# Patient Record
Sex: MALE | Race: WHITE | Employment: OTHER
[De-identification: names, ages, dates, MRNs, and addresses within clinical notes are randomized per-mention and may not be internally consistent; named-entity substitution may affect disease eponyms.]

---

## 2017-01-25 ENCOUNTER — OFFICE VISIT (OUTPATIENT)
Facility: CLINIC | Age: 57
End: 2017-01-25

## 2017-01-25 VITALS
WEIGHT: 147 LBS | TEMPERATURE: 97.8 F | HEIGHT: 69 IN | SYSTOLIC BLOOD PRESSURE: 130 MMHG | HEART RATE: 66 BPM | BODY MASS INDEX: 21.77 KG/M2 | OXYGEN SATURATION: 95 % | DIASTOLIC BLOOD PRESSURE: 70 MMHG

## 2017-01-25 DIAGNOSIS — S20.211D RIB CONTUSION, RIGHT, SUBSEQUENT ENCOUNTER: Primary | ICD-10-CM

## 2017-01-25 PROCEDURE — G8420 CALC BMI NORM PARAMETERS: HCPCS | Performed by: NURSE PRACTITIONER

## 2017-01-25 PROCEDURE — G8484 FLU IMMUNIZE NO ADMIN: HCPCS | Performed by: NURSE PRACTITIONER

## 2017-01-25 PROCEDURE — 1036F TOBACCO NON-USER: CPT | Performed by: NURSE PRACTITIONER

## 2017-01-25 PROCEDURE — G8427 DOCREV CUR MEDS BY ELIG CLIN: HCPCS | Performed by: NURSE PRACTITIONER

## 2017-01-25 PROCEDURE — 3017F COLORECTAL CA SCREEN DOC REV: CPT | Performed by: NURSE PRACTITIONER

## 2017-01-25 PROCEDURE — 99213 OFFICE O/P EST LOW 20 MIN: CPT | Performed by: NURSE PRACTITIONER

## 2017-01-25 RX ORDER — HYDROCODONE BITARTRATE AND ACETAMINOPHEN 5; 325 MG/1; MG/1
1 TABLET ORAL EVERY 6 HOURS PRN
Qty: 20 TABLET | Refills: 0 | Status: SHIPPED | OUTPATIENT
Start: 2017-01-25 | End: 2017-03-16

## 2017-01-25 ASSESSMENT — ENCOUNTER SYMPTOMS: COUGH: 1

## 2017-02-15 ENCOUNTER — OFFICE VISIT (OUTPATIENT)
Dept: CARDIOTHORACIC SURGERY | Facility: CLINIC | Age: 57
End: 2017-02-15

## 2017-02-15 VITALS
HEART RATE: 77 BPM | HEIGHT: 69 IN | RESPIRATION RATE: 18 BRPM | SYSTOLIC BLOOD PRESSURE: 175 MMHG | BODY MASS INDEX: 21.62 KG/M2 | DIASTOLIC BLOOD PRESSURE: 110 MMHG | WEIGHT: 146 LBS | TEMPERATURE: 97.9 F | OXYGEN SATURATION: 99 %

## 2017-02-15 DIAGNOSIS — Z86.79 S/P THORACIC AORTIC ANEURYSM REPAIR: Primary | ICD-10-CM

## 2017-02-15 DIAGNOSIS — Z98.890 S/P THORACIC AORTIC ANEURYSM REPAIR: Primary | ICD-10-CM

## 2017-02-15 PROCEDURE — 99213 OFFICE O/P EST LOW 20 MIN: CPT | Performed by: PHYSICIAN ASSISTANT

## 2017-02-15 PROCEDURE — G8484 FLU IMMUNIZE NO ADMIN: HCPCS | Performed by: PHYSICIAN ASSISTANT

## 2017-02-15 PROCEDURE — G8420 CALC BMI NORM PARAMETERS: HCPCS | Performed by: PHYSICIAN ASSISTANT

## 2017-02-15 PROCEDURE — 1036F TOBACCO NON-USER: CPT | Performed by: PHYSICIAN ASSISTANT

## 2017-02-15 PROCEDURE — 3017F COLORECTAL CA SCREEN DOC REV: CPT | Performed by: PHYSICIAN ASSISTANT

## 2017-02-15 PROCEDURE — G8427 DOCREV CUR MEDS BY ELIG CLIN: HCPCS | Performed by: PHYSICIAN ASSISTANT

## 2017-02-15 ASSESSMENT — ENCOUNTER SYMPTOMS
RESPIRATORY NEGATIVE: 1
GASTROINTESTINAL NEGATIVE: 1
EYES NEGATIVE: 1

## 2017-02-16 ENCOUNTER — HOSPITAL ENCOUNTER (OUTPATIENT)
Age: 57
Setting detail: SPECIMEN
Discharge: HOME | End: 2017-02-16
Payer: MEDICARE

## 2017-02-16 LAB
ABSOLUTE EOS #: 0.4 K/UL (ref 0–0.4)
ABSOLUTE LYMPH #: 1.8 K/UL (ref 1–4.8)
ABSOLUTE MONO #: 0.7 K/UL (ref 0.1–1.2)
BASOPHILS # BLD: 1 % (ref 0–2)
BASOPHILS ABSOLUTE: 0.1 K/UL (ref 0–0.2)
C-REACTIVE PROTEIN: 7.2 MG/L (ref 0–5)
DIFFERENTIAL TYPE: ABNORMAL
EOSINOPHILS RELATIVE PERCENT: 4 % (ref 1–4)
HCT VFR BLD CALC: 38.9 % (ref 41–53)
HEMOGLOBIN: 13.1 G/DL (ref 13.5–17.5)
LYMPHOCYTES # BLD: 18 % (ref 24–44)
MCH RBC QN AUTO: 30.5 PG (ref 26–34)
MCHC RBC AUTO-ENTMCNC: 33.6 G/DL (ref 31–37)
MCV RBC AUTO: 90.7 FL (ref 80–100)
MONOCYTES # BLD: 7 % (ref 2–11)
PDW BLD-RTO: 15 % (ref 12.5–15.4)
PLATELET # BLD: 289 K/UL (ref 140–450)
PLATELET ESTIMATE: ABNORMAL
PMV BLD AUTO: 9.5 FL (ref 6–12)
RBC # BLD: 4.29 M/UL (ref 4.5–5.9)
RBC # BLD: ABNORMAL 10*6/UL
SEDIMENTATION RATE, ERYTHROCYTE: 1 MM (ref 0–10)
SEG NEUTROPHILS: 70 % (ref 36–66)
SEGMENTED NEUTROPHILS ABSOLUTE COUNT: 7.2 K/UL (ref 1.8–7.7)
WBC # BLD: 10.3 K/UL (ref 3.5–11)
WBC # BLD: ABNORMAL 10*3/UL

## 2017-02-16 PROCEDURE — 87040 BLOOD CULTURE FOR BACTERIA: CPT

## 2017-02-16 PROCEDURE — 85651 RBC SED RATE NONAUTOMATED: CPT

## 2017-02-16 PROCEDURE — 86140 C-REACTIVE PROTEIN: CPT

## 2017-02-16 PROCEDURE — 85025 COMPLETE CBC W/AUTO DIFF WBC: CPT

## 2017-02-22 LAB
CULTURE: NORMAL
Lab: NORMAL
Lab: NORMAL
SPECIMEN DESCRIPTION: NORMAL
SPECIMEN DESCRIPTION: NORMAL
STATUS: NORMAL
STATUS: NORMAL

## 2017-03-16 ENCOUNTER — HOSPITAL ENCOUNTER (OUTPATIENT)
Dept: PAIN MANAGEMENT | Age: 57
Discharge: HOME | End: 2017-03-16
Payer: MEDICARE

## 2017-03-16 VITALS
HEART RATE: 64 BPM | SYSTOLIC BLOOD PRESSURE: 162 MMHG | DIASTOLIC BLOOD PRESSURE: 115 MMHG | TEMPERATURE: 97.7 F | RESPIRATION RATE: 16 BRPM

## 2017-03-16 DIAGNOSIS — M54.5 CHRONIC RIGHT-SIDED LOW BACK PAIN, WITH SCIATICA PRESENCE UNSPECIFIED: ICD-10-CM

## 2017-03-16 DIAGNOSIS — M54.2 NECK PAIN: Primary | ICD-10-CM

## 2017-03-16 DIAGNOSIS — G89.29 CHRONIC RIGHT-SIDED LOW BACK PAIN, WITH SCIATICA PRESENCE UNSPECIFIED: ICD-10-CM

## 2017-03-16 PROCEDURE — G0463 HOSPITAL OUTPT CLINIC VISIT: HCPCS

## 2017-03-16 ASSESSMENT — ENCOUNTER SYMPTOMS
SPUTUM PRODUCTION: 0
BOWEL INCONTINENCE: 0
EYE DISCHARGE: 0
HEMOPTYSIS: 0
JAUNDICE: 0
STRIDOR: 0
UNUSUAL HAIR DISTRIBUTION: 0
SUSPICIOUS LESIONS: 0

## 2017-03-16 ASSESSMENT — PAIN SCALES - GENERAL: PAINLEVEL_OUTOF10: 8

## 2017-03-16 ASSESSMENT — PAIN DESCRIPTION - ONSET: ONSET: ON-GOING

## 2017-03-16 ASSESSMENT — PAIN DESCRIPTION - ORIENTATION: ORIENTATION: RIGHT;LEFT

## 2017-03-16 ASSESSMENT — PAIN DESCRIPTION - PROGRESSION: CLINICAL_PROGRESSION: NOT CHANGED

## 2017-03-16 ASSESSMENT — PAIN DESCRIPTION - PAIN TYPE: TYPE: CHRONIC PAIN

## 2017-03-16 ASSESSMENT — PAIN DESCRIPTION - FREQUENCY: FREQUENCY: CONTINUOUS

## 2017-03-16 ASSESSMENT — PAIN DESCRIPTION - DESCRIPTORS: DESCRIPTORS: CONSTANT;SHARP;DULL

## 2017-03-20 ENCOUNTER — HOSPITAL ENCOUNTER (OUTPATIENT)
Dept: PHYSICAL THERAPY | Facility: CLINIC | Age: 57
Setting detail: THERAPIES SERIES
Discharge: HOME | End: 2017-03-20
Payer: MEDICARE

## 2017-03-20 PROCEDURE — G8988 SELF CARE GOAL STATUS: HCPCS

## 2017-03-20 PROCEDURE — 97110 THERAPEUTIC EXERCISES: CPT

## 2017-03-20 PROCEDURE — 97161 PT EVAL LOW COMPLEX 20 MIN: CPT

## 2017-03-20 PROCEDURE — 97140 MANUAL THERAPY 1/> REGIONS: CPT

## 2017-03-20 PROCEDURE — G8987 SELF CARE CURRENT STATUS: HCPCS

## 2017-03-23 ENCOUNTER — HOSPITAL ENCOUNTER (OUTPATIENT)
Dept: PHYSICAL THERAPY | Facility: CLINIC | Age: 57
Setting detail: THERAPIES SERIES
Discharge: HOME | End: 2017-03-23
Payer: MEDICARE

## 2017-03-23 PROCEDURE — 97110 THERAPEUTIC EXERCISES: CPT

## 2017-03-23 PROCEDURE — 97140 MANUAL THERAPY 1/> REGIONS: CPT

## 2017-03-24 ENCOUNTER — HOSPITAL ENCOUNTER (OUTPATIENT)
Dept: PAIN MANAGEMENT | Age: 57
Discharge: HOME | End: 2017-03-24
Payer: MEDICARE

## 2017-03-24 ENCOUNTER — HOSPITAL ENCOUNTER (OUTPATIENT)
Age: 57
Setting detail: SPECIMEN
Discharge: HOME | End: 2017-03-24
Payer: MEDICARE

## 2017-03-24 VITALS
SYSTOLIC BLOOD PRESSURE: 104 MMHG | TEMPERATURE: 98.2 F | DIASTOLIC BLOOD PRESSURE: 60 MMHG | HEART RATE: 69 BPM | RESPIRATION RATE: 18 BRPM

## 2017-03-24 DIAGNOSIS — I10 ESSENTIAL HYPERTENSION: ICD-10-CM

## 2017-03-24 DIAGNOSIS — Z12.5 PROSTATE CANCER SCREENING: ICD-10-CM

## 2017-03-24 LAB
ALBUMIN SERPL-MCNC: 4.1 G/DL (ref 3.5–5.2)
ALBUMIN/GLOBULIN RATIO: 1.5 (ref 1–2.5)
ALP BLD-CCNC: 73 U/L (ref 40–129)
ALT SERPL-CCNC: 10 U/L (ref 5–41)
ANION GAP SERPL CALCULATED.3IONS-SCNC: 11 MMOL/L (ref 9–17)
AST SERPL-CCNC: 14 U/L
BILIRUB SERPL-MCNC: 0.46 MG/DL (ref 0.3–1.2)
BUN BLDV-MCNC: 13 MG/DL (ref 6–20)
BUN/CREAT BLD: NORMAL (ref 9–20)
CALCIUM SERPL-MCNC: 8.6 MG/DL (ref 8.6–10.4)
CHLORIDE BLD-SCNC: 100 MMOL/L (ref 98–107)
CHOLESTEROL/HDL RATIO: 3.1
CHOLESTEROL: 151 MG/DL
CO2: 28 MMOL/L (ref 20–31)
CREAT SERPL-MCNC: 0.79 MG/DL (ref 0.7–1.2)
GFR AFRICAN AMERICAN: >60 ML/MIN
GFR NON-AFRICAN AMERICAN: >60 ML/MIN
GFR SERPL CREATININE-BSD FRML MDRD: NORMAL ML/MIN/{1.73_M2}
GFR SERPL CREATININE-BSD FRML MDRD: NORMAL ML/MIN/{1.73_M2}
GLUCOSE BLD-MCNC: 90 MG/DL (ref 70–99)
HCT VFR BLD CALC: 39.9 % (ref 41–53)
HDLC SERPL-MCNC: 49 MG/DL
HEMOGLOBIN: 13.5 G/DL (ref 13.5–17.5)
LDL CHOLESTEROL: 91 MG/DL (ref 0–130)
MCH RBC QN AUTO: 30.6 PG (ref 26–34)
MCHC RBC AUTO-ENTMCNC: 33.8 G/DL (ref 31–37)
MCV RBC AUTO: 90.7 FL (ref 80–100)
PDW BLD-RTO: 15.5 % (ref 12.5–15.4)
PLATELET # BLD: 214 K/UL (ref 140–450)
PMV BLD AUTO: 8.9 FL (ref 6–12)
POTASSIUM SERPL-SCNC: 4.2 MMOL/L (ref 3.7–5.3)
PROSTATE SPECIFIC ANTIGEN: 0.68 UG/L
RBC # BLD: 4.4 M/UL (ref 4.5–5.9)
SODIUM BLD-SCNC: 139 MMOL/L (ref 135–144)
TOTAL PROTEIN: 6.8 G/DL (ref 6.4–8.3)
TRIGL SERPL-MCNC: 53 MG/DL
VLDLC SERPL CALC-MCNC: NORMAL MG/DL (ref 1–30)
WBC # BLD: 10.3 K/UL (ref 3.5–11)

## 2017-03-24 PROCEDURE — 97032 APPL MODALITY 1+ESTIM EA 15: CPT

## 2017-03-24 PROCEDURE — 97112 NEUROMUSCULAR REEDUCATION: CPT

## 2017-03-24 ASSESSMENT — PAIN SCALES - GENERAL: PAINLEVEL_OUTOF10: 8

## 2017-03-24 ASSESSMENT — PAIN DESCRIPTION - DESCRIPTORS: DESCRIPTORS: SHARP;SHOOTING

## 2017-03-24 ASSESSMENT — PAIN DESCRIPTION - PROGRESSION: CLINICAL_PROGRESSION: NOT CHANGED

## 2017-03-24 ASSESSMENT — PAIN DESCRIPTION - DIRECTION: RADIATING_TOWARDS: PT

## 2017-03-24 ASSESSMENT — PAIN DESCRIPTION - LOCATION: LOCATION: ARM

## 2017-03-24 ASSESSMENT — PAIN DESCRIPTION - ONSET: ONSET: ON-GOING

## 2017-03-24 ASSESSMENT — PAIN DESCRIPTION - PAIN TYPE: TYPE: CHRONIC PAIN

## 2017-03-24 ASSESSMENT — PAIN DESCRIPTION - ORIENTATION: ORIENTATION: RIGHT;LEFT

## 2017-03-24 ASSESSMENT — PAIN DESCRIPTION - FREQUENCY: FREQUENCY: CONTINUOUS

## 2017-03-29 ENCOUNTER — HOSPITAL ENCOUNTER (OUTPATIENT)
Dept: PAIN MANAGEMENT | Age: 57
Discharge: HOME | End: 2017-03-29
Payer: MEDICARE

## 2017-03-29 VITALS
WEIGHT: 150 LBS | BODY MASS INDEX: 22.73 KG/M2 | HEART RATE: 79 BPM | DIASTOLIC BLOOD PRESSURE: 86 MMHG | TEMPERATURE: 98.2 F | HEIGHT: 68 IN | RESPIRATION RATE: 16 BRPM | SYSTOLIC BLOOD PRESSURE: 128 MMHG

## 2017-03-29 PROCEDURE — 97032 APPL MODALITY 1+ESTIM EA 15: CPT

## 2017-03-29 PROCEDURE — 97112 NEUROMUSCULAR REEDUCATION: CPT

## 2017-03-29 ASSESSMENT — PAIN DESCRIPTION - ORIENTATION: ORIENTATION: RIGHT;LEFT

## 2017-03-29 ASSESSMENT — PAIN DESCRIPTION - FREQUENCY: FREQUENCY: CONTINUOUS

## 2017-03-29 ASSESSMENT — PAIN DESCRIPTION - PROGRESSION: CLINICAL_PROGRESSION: NOT CHANGED

## 2017-03-29 ASSESSMENT — PAIN SCALES - GENERAL: PAINLEVEL_OUTOF10: 8

## 2017-03-29 ASSESSMENT — PAIN DESCRIPTION - ONSET: ONSET: ON-GOING

## 2017-03-29 ASSESSMENT — PAIN DESCRIPTION - DESCRIPTORS: DESCRIPTORS: SHARP;SHOOTING

## 2017-03-29 ASSESSMENT — PAIN DESCRIPTION - LOCATION: LOCATION: ARM

## 2017-03-29 ASSESSMENT — PAIN DESCRIPTION - PAIN TYPE: TYPE: CHRONIC PAIN

## 2017-03-31 ENCOUNTER — HOSPITAL ENCOUNTER (OUTPATIENT)
Dept: PAIN MANAGEMENT | Age: 57
Discharge: HOME | End: 2017-03-31
Payer: MEDICARE

## 2017-03-31 VITALS
WEIGHT: 150 LBS | BODY MASS INDEX: 22.73 KG/M2 | TEMPERATURE: 98 F | RESPIRATION RATE: 18 BRPM | HEART RATE: 78 BPM | HEIGHT: 68 IN

## 2017-03-31 PROCEDURE — 97032 APPL MODALITY 1+ESTIM EA 15: CPT

## 2017-03-31 PROCEDURE — 97112 NEUROMUSCULAR REEDUCATION: CPT

## 2017-03-31 ASSESSMENT — PAIN DESCRIPTION - DESCRIPTORS: DESCRIPTORS: SHARP;SHOOTING

## 2017-03-31 ASSESSMENT — PAIN SCALES - GENERAL: PAINLEVEL_OUTOF10: 8

## 2017-03-31 ASSESSMENT — PAIN DESCRIPTION - ORIENTATION: ORIENTATION: RIGHT;LEFT

## 2017-03-31 ASSESSMENT — PAIN DESCRIPTION - ONSET: ONSET: ON-GOING

## 2017-03-31 ASSESSMENT — PAIN DESCRIPTION - LOCATION: LOCATION: ARM

## 2017-03-31 ASSESSMENT — PAIN DESCRIPTION - PAIN TYPE: TYPE: CHRONIC PAIN

## 2017-03-31 ASSESSMENT — PAIN DESCRIPTION - FREQUENCY: FREQUENCY: CONTINUOUS

## 2017-03-31 ASSESSMENT — PAIN DESCRIPTION - PROGRESSION: CLINICAL_PROGRESSION: NOT CHANGED

## 2017-04-03 ENCOUNTER — HOSPITAL ENCOUNTER (OUTPATIENT)
Dept: PHYSICAL THERAPY | Facility: CLINIC | Age: 57
Setting detail: THERAPIES SERIES
Discharge: HOME | End: 2017-04-03
Payer: MEDICARE

## 2017-05-01 DIAGNOSIS — I10 ESSENTIAL HYPERTENSION: ICD-10-CM

## 2017-05-02 RX ORDER — AMLODIPINE BESYLATE 5 MG/1
TABLET ORAL
Qty: 30 TABLET | Refills: 5 | Status: SHIPPED | OUTPATIENT
Start: 2017-05-02 | End: 2017-05-14 | Stop reason: SDUPTHER

## 2017-05-08 ENCOUNTER — HOSPITAL ENCOUNTER (OUTPATIENT)
Age: 57
Discharge: HOME | End: 2017-05-08
Payer: MEDICARE

## 2017-05-08 PROCEDURE — 36415 COLL VENOUS BLD VENIPUNCTURE: CPT

## 2017-05-08 PROCEDURE — 87040 BLOOD CULTURE FOR BACTERIA: CPT

## 2017-05-14 ENCOUNTER — HOSPITAL ENCOUNTER (EMERGENCY)
Age: 57
Discharge: HOME | End: 2017-05-14
Attending: EMERGENCY MEDICINE
Payer: MEDICARE

## 2017-05-14 VITALS
HEIGHT: 69 IN | TEMPERATURE: 97.5 F | DIASTOLIC BLOOD PRESSURE: 108 MMHG | RESPIRATION RATE: 16 BRPM | OXYGEN SATURATION: 100 % | BODY MASS INDEX: 21.03 KG/M2 | SYSTOLIC BLOOD PRESSURE: 151 MMHG | WEIGHT: 142 LBS | HEART RATE: 58 BPM

## 2017-05-14 DIAGNOSIS — L02.91 ABSCESS: Primary | ICD-10-CM

## 2017-05-14 PROCEDURE — 10060 I&D ABSCESS SIMPLE/SINGLE: CPT

## 2017-05-14 PROCEDURE — 2500000003 HC RX 250 WO HCPCS: Performed by: PHYSICIAN ASSISTANT

## 2017-05-14 PROCEDURE — 99282 EMERGENCY DEPT VISIT SF MDM: CPT

## 2017-05-14 PROCEDURE — 87205 SMEAR GRAM STAIN: CPT

## 2017-05-14 PROCEDURE — 86403 PARTICLE AGGLUT ANTBDY SCRN: CPT

## 2017-05-14 PROCEDURE — 87070 CULTURE OTHR SPECIMN AEROBIC: CPT

## 2017-05-14 RX ORDER — ASPIRIN 81 MG/1
81 TABLET, CHEWABLE ORAL DAILY
COMMUNITY
End: 2019-12-02 | Stop reason: SDUPTHER

## 2017-05-14 RX ORDER — LIDOCAINE HYDROCHLORIDE 10 MG/ML
20 INJECTION, SOLUTION INFILTRATION; PERINEURAL ONCE
Status: COMPLETED | OUTPATIENT
Start: 2017-05-14 | End: 2017-05-14

## 2017-05-14 RX ORDER — CLINDAMYCIN HYDROCHLORIDE 300 MG/1
300 CAPSULE ORAL 3 TIMES DAILY
Qty: 30 CAPSULE | Refills: 0 | Status: SHIPPED | OUTPATIENT
Start: 2017-05-14 | End: 2017-05-24

## 2017-05-14 RX ADMIN — LIDOCAINE HYDROCHLORIDE 20 ML: 10 INJECTION, SOLUTION INFILTRATION; PERINEURAL at 10:20

## 2017-05-14 ASSESSMENT — PAIN SCALES - GENERAL
PAINLEVEL_OUTOF10: 8
PAINLEVEL_OUTOF10: 8

## 2017-05-14 ASSESSMENT — PAIN DESCRIPTION - LOCATION: LOCATION: GENERALIZED

## 2017-05-14 ASSESSMENT — PAIN DESCRIPTION - PAIN TYPE: TYPE: CHRONIC PAIN

## 2017-05-14 ASSESSMENT — PAIN DESCRIPTION - DESCRIPTORS: DESCRIPTORS: ACHING

## 2017-05-16 LAB
CULTURE: ABNORMAL
CULTURE: ABNORMAL
DIRECT EXAM: ABNORMAL
DIRECT EXAM: ABNORMAL
Lab: ABNORMAL
SPECIMEN DESCRIPTION: ABNORMAL
SPECIMEN DESCRIPTION: ABNORMAL
STATUS: ABNORMAL

## 2017-05-18 ENCOUNTER — HOSPITAL ENCOUNTER (OUTPATIENT)
Dept: PAIN MANAGEMENT | Age: 57
Discharge: HOME | End: 2017-05-18
Payer: MEDICARE

## 2017-05-18 VITALS
SYSTOLIC BLOOD PRESSURE: 163 MMHG | TEMPERATURE: 98.2 F | RESPIRATION RATE: 16 BRPM | HEART RATE: 68 BPM | DIASTOLIC BLOOD PRESSURE: 120 MMHG

## 2017-05-18 PROCEDURE — 99213 OFFICE O/P EST LOW 20 MIN: CPT

## 2017-05-18 ASSESSMENT — ENCOUNTER SYMPTOMS
EYE DISCHARGE: 0
UNUSUAL HAIR DISTRIBUTION: 0
SUSPICIOUS LESIONS: 0
BLURRED VISION: 0

## 2017-05-18 ASSESSMENT — PAIN DESCRIPTION - DESCRIPTORS: DESCRIPTORS: ACHING;CONSTANT;SORE;DISCOMFORT

## 2017-05-18 ASSESSMENT — PAIN DESCRIPTION - LOCATION: LOCATION: GENERALIZED

## 2017-05-18 ASSESSMENT — PAIN DESCRIPTION - PAIN TYPE: TYPE: CHRONIC PAIN

## 2017-05-18 ASSESSMENT — PAIN SCALES - GENERAL: PAINLEVEL_OUTOF10: 8

## 2017-05-18 ASSESSMENT — PAIN DESCRIPTION - FREQUENCY: FREQUENCY: CONTINUOUS

## 2017-05-30 DIAGNOSIS — I10 ESSENTIAL HYPERTENSION: ICD-10-CM

## 2017-05-30 RX ORDER — AMLODIPINE BESYLATE 5 MG/1
TABLET ORAL
Qty: 30 TABLET | Refills: 1 | Status: SHIPPED | OUTPATIENT
Start: 2017-05-30 | End: 2017-06-23 | Stop reason: SDUPTHER

## 2017-06-23 ENCOUNTER — HOSPITAL ENCOUNTER (OUTPATIENT)
Dept: GENERAL RADIOLOGY | Facility: CLINIC | Age: 57
Discharge: HOME | End: 2017-06-23
Payer: MEDICARE

## 2017-06-23 ENCOUNTER — HOSPITAL ENCOUNTER (OUTPATIENT)
Facility: CLINIC | Age: 57
Discharge: HOME | End: 2017-06-23
Payer: MEDICARE

## 2017-06-23 DIAGNOSIS — M54.2 CHRONIC NECK PAIN: ICD-10-CM

## 2017-06-23 DIAGNOSIS — G89.29 CHRONIC NECK PAIN: ICD-10-CM

## 2017-06-23 PROCEDURE — 72040 X-RAY EXAM NECK SPINE 2-3 VW: CPT

## 2017-06-28 ENCOUNTER — HOSPITAL ENCOUNTER (OUTPATIENT)
Dept: CT IMAGING | Age: 57
Discharge: HOME | End: 2017-06-28
Payer: MEDICARE

## 2017-06-28 ENCOUNTER — HOSPITAL ENCOUNTER (EMERGENCY)
Age: 57
Discharge: HOME | End: 2017-06-28
Attending: EMERGENCY MEDICINE
Payer: MEDICARE

## 2017-06-28 VITALS
RESPIRATION RATE: 16 BRPM | SYSTOLIC BLOOD PRESSURE: 154 MMHG | BODY MASS INDEX: 20.74 KG/M2 | DIASTOLIC BLOOD PRESSURE: 115 MMHG | OXYGEN SATURATION: 98 % | WEIGHT: 140 LBS | TEMPERATURE: 98.1 F | HEART RATE: 77 BPM

## 2017-06-28 DIAGNOSIS — T80.1XXA IV INFILTRATE, INITIAL ENCOUNTER: Primary | ICD-10-CM

## 2017-06-28 DIAGNOSIS — J38.4 GLOTTIC EDEMA: ICD-10-CM

## 2017-06-28 PROCEDURE — 70491 CT SOFT TISSUE NECK W/DYE: CPT

## 2017-06-28 PROCEDURE — 2580000003 HC RX 258: Performed by: FAMILY MEDICINE

## 2017-06-28 PROCEDURE — 99283 EMERGENCY DEPT VISIT LOW MDM: CPT

## 2017-06-28 PROCEDURE — 6360000004 HC RX CONTRAST MEDICATION: Performed by: FAMILY MEDICINE

## 2017-06-28 PROCEDURE — 6370000000 HC RX 637 (ALT 250 FOR IP): Performed by: PHYSICIAN ASSISTANT

## 2017-06-28 RX ORDER — 0.9 % SODIUM CHLORIDE 0.9 %
100 INTRAVENOUS SOLUTION INTRAVENOUS ONCE
Status: COMPLETED | OUTPATIENT
Start: 2017-06-28 | End: 2017-06-28

## 2017-06-28 RX ORDER — ACETAMINOPHEN 325 MG/1
650 TABLET ORAL EVERY 6 HOURS PRN
Qty: 20 TABLET | Refills: 0 | Status: SHIPPED | OUTPATIENT
Start: 2017-06-28 | End: 2017-11-01

## 2017-06-28 RX ORDER — SODIUM CHLORIDE 0.9 % (FLUSH) 0.9 %
10 SYRINGE (ML) INJECTION PRN
Status: DISCONTINUED | OUTPATIENT
Start: 2017-06-28 | End: 2017-07-01 | Stop reason: HOSPADM

## 2017-06-28 RX ORDER — ACETAMINOPHEN 500 MG
1000 TABLET ORAL ONCE
Status: COMPLETED | OUTPATIENT
Start: 2017-06-28 | End: 2017-06-28

## 2017-06-28 RX ADMIN — Medication 10 ML: at 10:04

## 2017-06-28 RX ADMIN — SODIUM CHLORIDE 100 ML: 9 INJECTION, SOLUTION INTRAVENOUS at 10:03

## 2017-06-28 RX ADMIN — IOVERSOL 70 ML: 741 INJECTION INTRA-ARTERIAL; INTRAVENOUS at 10:04

## 2017-06-28 RX ADMIN — ACETAMINOPHEN 1000 MG: 500 TABLET ORAL at 12:06

## 2017-06-28 ASSESSMENT — PAIN DESCRIPTION - LOCATION: LOCATION: ARM

## 2017-06-28 ASSESSMENT — PAIN SCALES - GENERAL
PAINLEVEL_OUTOF10: 10
PAINLEVEL_OUTOF10: 10

## 2017-06-28 ASSESSMENT — PAIN DESCRIPTION - DESCRIPTORS: DESCRIPTORS: BURNING

## 2017-06-28 ASSESSMENT — PAIN DESCRIPTION - ORIENTATION: ORIENTATION: RIGHT

## 2017-07-03 ENCOUNTER — HOSPITAL ENCOUNTER (OUTPATIENT)
Dept: PHYSICAL THERAPY | Age: 57
Setting detail: THERAPIES SERIES
Discharge: HOME | End: 2017-07-03
Payer: MEDICARE

## 2017-07-03 PROCEDURE — G8984 CARRY CURRENT STATUS: HCPCS

## 2017-07-03 PROCEDURE — 97162 PT EVAL MOD COMPLEX 30 MIN: CPT

## 2017-07-03 PROCEDURE — 97110 THERAPEUTIC EXERCISES: CPT

## 2017-07-03 PROCEDURE — G8985 CARRY GOAL STATUS: HCPCS

## 2017-07-05 ENCOUNTER — HOSPITAL ENCOUNTER (OUTPATIENT)
Dept: PHYSICAL THERAPY | Age: 57
Setting detail: THERAPIES SERIES
Discharge: HOME | End: 2017-07-05
Payer: MEDICARE

## 2017-07-05 ASSESSMENT — PAIN DESCRIPTION - INTENSITY: RATING_2: 7

## 2017-07-05 ASSESSMENT — PAIN DESCRIPTION - LOCATION: LOCATION: NECK

## 2017-07-05 ASSESSMENT — PAIN DESCRIPTION - ORIENTATION
ORIENTATION: RIGHT;LEFT
ORIENTATION_2: LEFT;RIGHT

## 2017-07-05 ASSESSMENT — PAIN SCALES - GENERAL: PAINLEVEL_OUTOF10: 7

## 2017-07-10 ENCOUNTER — HOSPITAL ENCOUNTER (OUTPATIENT)
Dept: PHYSICAL THERAPY | Age: 57
Setting detail: THERAPIES SERIES
Discharge: HOME | End: 2017-07-10
Payer: MEDICARE

## 2017-07-17 ENCOUNTER — HOSPITAL ENCOUNTER (OUTPATIENT)
Dept: PHYSICAL THERAPY | Age: 57
Setting detail: THERAPIES SERIES
Discharge: HOME | End: 2017-07-17
Payer: MEDICARE

## 2017-07-17 PROCEDURE — 97113 AQUATIC THERAPY/EXERCISES: CPT

## 2017-07-17 ASSESSMENT — PAIN DESCRIPTION - LOCATION
LOCATION_2: NECK
LOCATION: BACK

## 2017-07-17 ASSESSMENT — PAIN DESCRIPTION - ORIENTATION
ORIENTATION_2: LEFT;RIGHT;UPPER
ORIENTATION: LOWER;LEFT;RIGHT

## 2017-07-17 ASSESSMENT — PAIN DESCRIPTION - PAIN TYPE
TYPE_2: CHRONIC PAIN
TYPE: CHRONIC PAIN

## 2017-07-17 ASSESSMENT — PAIN SCALES - GENERAL: PAINLEVEL_OUTOF10: 5

## 2017-07-17 ASSESSMENT — PAIN DESCRIPTION - INTENSITY: RATING_2: 5

## 2017-07-19 ENCOUNTER — HOSPITAL ENCOUNTER (OUTPATIENT)
Dept: PHYSICAL THERAPY | Age: 57
Setting detail: THERAPIES SERIES
Discharge: HOME | End: 2017-07-19
Payer: MEDICARE

## 2017-07-19 PROCEDURE — 97113 AQUATIC THERAPY/EXERCISES: CPT

## 2017-07-19 ASSESSMENT — PAIN SCALES - GENERAL: PAINLEVEL_OUTOF10: 5

## 2017-07-19 ASSESSMENT — PAIN DESCRIPTION - PAIN TYPE
TYPE: CHRONIC PAIN
TYPE_2: CHRONIC PAIN

## 2017-07-19 ASSESSMENT — PAIN DESCRIPTION - LOCATION
LOCATION_2: NECK
LOCATION: BACK

## 2017-07-19 ASSESSMENT — PAIN DESCRIPTION - ORIENTATION
ORIENTATION_2: LEFT;RIGHT;UPPER
ORIENTATION: LOWER;LEFT;RIGHT

## 2017-07-19 ASSESSMENT — PAIN DESCRIPTION - INTENSITY: RATING_2: 5

## 2017-07-24 ENCOUNTER — HOSPITAL ENCOUNTER (OUTPATIENT)
Dept: PHYSICAL THERAPY | Age: 57
Setting detail: THERAPIES SERIES
Discharge: HOME | End: 2017-07-24
Payer: MEDICARE

## 2017-07-24 PROCEDURE — 97113 AQUATIC THERAPY/EXERCISES: CPT

## 2017-07-24 ASSESSMENT — PAIN DESCRIPTION - ORIENTATION
ORIENTATION: LEFT;RIGHT;LOWER
ORIENTATION_2: LEFT;RIGHT;UPPER

## 2017-07-24 ASSESSMENT — PAIN SCALES - GENERAL: PAINLEVEL_OUTOF10: 5

## 2017-07-24 ASSESSMENT — PAIN DESCRIPTION - LOCATION
LOCATION_2: NECK
LOCATION: BACK

## 2017-07-24 ASSESSMENT — PAIN DESCRIPTION - PAIN TYPE
TYPE: CHRONIC PAIN
TYPE_2: CHRONIC PAIN

## 2017-07-24 ASSESSMENT — PAIN DESCRIPTION - INTENSITY: RATING_2: 5

## 2017-08-17 ENCOUNTER — HOSPITAL ENCOUNTER (OUTPATIENT)
Dept: NEUROLOGY | Age: 57
Discharge: HOME | End: 2017-08-17
Payer: MEDICARE

## 2017-08-17 PROCEDURE — 95910 NRV CNDJ TEST 7-8 STUDIES: CPT | Performed by: PHYSICAL MEDICINE & REHABILITATION

## 2017-08-17 PROCEDURE — 95886 MUSC TEST DONE W/N TEST COMP: CPT | Performed by: PHYSICAL MEDICINE & REHABILITATION

## 2017-08-18 ENCOUNTER — HOSPITAL ENCOUNTER (OUTPATIENT)
Dept: MRI IMAGING | Age: 57
Discharge: HOME | End: 2017-08-18
Payer: MEDICARE

## 2017-08-18 DIAGNOSIS — G89.29 CHRONIC NECK PAIN: ICD-10-CM

## 2017-08-18 DIAGNOSIS — M79.602 BILATERAL ARM PAIN: ICD-10-CM

## 2017-08-18 DIAGNOSIS — R29.898 BILATERAL ARM WEAKNESS: ICD-10-CM

## 2017-08-18 DIAGNOSIS — M79.601 BILATERAL ARM PAIN: ICD-10-CM

## 2017-08-18 DIAGNOSIS — M54.2 CHRONIC NECK PAIN: ICD-10-CM

## 2017-08-18 PROCEDURE — 72141 MRI NECK SPINE W/O DYE: CPT

## 2017-08-22 DIAGNOSIS — M54.2 CHRONIC NECK PAIN: ICD-10-CM

## 2017-08-22 DIAGNOSIS — R20.0 LT ARM NUMBNESS: ICD-10-CM

## 2017-08-22 DIAGNOSIS — R20.0 RIGHT ARM NUMBNESS: ICD-10-CM

## 2017-08-22 DIAGNOSIS — G89.29 CHRONIC NECK PAIN: ICD-10-CM

## 2017-11-01 ENCOUNTER — HOSPITAL ENCOUNTER (OUTPATIENT)
Age: 57
Discharge: HOME | End: 2017-11-01
Payer: MEDICARE

## 2017-11-01 ENCOUNTER — HOSPITAL ENCOUNTER (OUTPATIENT)
Dept: GENERAL RADIOLOGY | Age: 57
Discharge: HOME | End: 2017-11-01
Payer: MEDICARE

## 2017-11-01 DIAGNOSIS — M25.50 ARTHRALGIA OF MULTIPLE JOINTS: ICD-10-CM

## 2017-11-01 DIAGNOSIS — R09.89 RHONCHI AT LEFT LUNG BASE: ICD-10-CM

## 2017-11-01 DIAGNOSIS — Z77.011 EXPOSURE TO LEAD: ICD-10-CM

## 2017-11-01 PROBLEM — F32.2 SEVERE SINGLE CURRENT EPISODE OF MAJOR DEPRESSIVE DISORDER, WITHOUT PSYCHOTIC FEATURES (HCC): Status: ACTIVE | Noted: 2017-11-01

## 2017-11-01 LAB
EKG ATRIAL RATE: 58 BPM
EKG P AXIS: 82 DEGREES
EKG P-R INTERVAL: 158 MS
EKG Q-T INTERVAL: 416 MS
EKG QRS DURATION: 104 MS
EKG QTC CALCULATION (BAZETT): 408 MS
EKG R AXIS: 69 DEGREES
EKG T AXIS: 61 DEGREES
EKG VENTRICULAR RATE: 58 BPM
URIC ACID: 5.2 MG/DL (ref 3.4–7)

## 2017-11-01 PROCEDURE — 83655 ASSAY OF LEAD: CPT

## 2017-11-01 PROCEDURE — 71020 XR CHEST STANDARD TWO VW: CPT

## 2017-11-01 PROCEDURE — 84550 ASSAY OF BLOOD/URIC ACID: CPT

## 2017-11-01 PROCEDURE — 36415 COLL VENOUS BLD VENIPUNCTURE: CPT

## 2017-11-01 PROCEDURE — 93005 ELECTROCARDIOGRAM TRACING: CPT

## 2017-11-02 LAB — LEAD BLOOD: 3 UG/DL (ref 0–19)

## 2017-11-29 ENCOUNTER — HOSPITAL ENCOUNTER (OUTPATIENT)
Dept: CT IMAGING | Age: 57
Discharge: HOME | End: 2017-11-29
Payer: MEDICARE

## 2017-11-29 DIAGNOSIS — R05.3 CHRONIC COUGH: ICD-10-CM

## 2017-11-29 DIAGNOSIS — R09.89 ABNORMAL LUNG SOUNDS: ICD-10-CM

## 2017-11-29 PROCEDURE — 6360000004 HC RX CONTRAST MEDICATION: Performed by: NURSE PRACTITIONER

## 2017-11-29 PROCEDURE — 2580000003 HC RX 258: Performed by: NURSE PRACTITIONER

## 2017-11-29 PROCEDURE — 71260 CT THORAX DX C+: CPT

## 2017-11-29 RX ORDER — 0.9 % SODIUM CHLORIDE 0.9 %
100 INTRAVENOUS SOLUTION INTRAVENOUS ONCE
Status: COMPLETED | OUTPATIENT
Start: 2017-11-29 | End: 2017-11-29

## 2017-11-29 RX ORDER — SODIUM CHLORIDE 0.9 % (FLUSH) 0.9 %
10 SYRINGE (ML) INJECTION PRN
Status: DISCONTINUED | OUTPATIENT
Start: 2017-11-29 | End: 2017-12-02 | Stop reason: HOSPADM

## 2017-11-29 RX ADMIN — SODIUM CHLORIDE 100 ML: 9 INJECTION, SOLUTION INTRAVENOUS at 08:30

## 2017-11-29 RX ADMIN — Medication 10 ML: at 08:30

## 2017-11-29 RX ADMIN — IOPAMIDOL 75 ML: 755 INJECTION, SOLUTION INTRAVENOUS at 08:30

## 2018-01-23 ENCOUNTER — APPOINTMENT (OUTPATIENT)
Dept: GENERAL RADIOLOGY | Age: 58
End: 2018-01-23
Payer: MEDICARE

## 2018-01-23 ENCOUNTER — HOSPITAL ENCOUNTER (EMERGENCY)
Age: 58
Discharge: HOME | End: 2018-01-23
Attending: EMERGENCY MEDICINE
Payer: MEDICARE

## 2018-01-23 ENCOUNTER — APPOINTMENT (OUTPATIENT)
Dept: CT IMAGING | Age: 58
End: 2018-01-23
Payer: MEDICARE

## 2018-01-23 VITALS
HEIGHT: 69 IN | OXYGEN SATURATION: 100 % | DIASTOLIC BLOOD PRESSURE: 111 MMHG | SYSTOLIC BLOOD PRESSURE: 177 MMHG | HEART RATE: 59 BPM | WEIGHT: 140 LBS | RESPIRATION RATE: 16 BRPM | TEMPERATURE: 97.4 F | BODY MASS INDEX: 20.73 KG/M2

## 2018-01-23 DIAGNOSIS — R10.13 ABDOMINAL PAIN, EPIGASTRIC: Primary | ICD-10-CM

## 2018-01-23 DIAGNOSIS — B34.9 VIRAL SYNDROME: ICD-10-CM

## 2018-01-23 LAB
ABSOLUTE EOS #: 0.3 K/UL (ref 0–0.4)
ABSOLUTE IMMATURE GRANULOCYTE: ABNORMAL K/UL (ref 0–0.3)
ABSOLUTE LYMPH #: 1.6 K/UL (ref 1–4.8)
ABSOLUTE MONO #: 0.5 K/UL (ref 0.1–1.3)
ALBUMIN SERPL-MCNC: 4 G/DL (ref 3.5–5.2)
ALBUMIN/GLOBULIN RATIO: NORMAL (ref 1–2.5)
ALP BLD-CCNC: 69 U/L (ref 40–129)
ALT SERPL-CCNC: 19 U/L (ref 5–41)
ANION GAP SERPL CALCULATED.3IONS-SCNC: 9 MMOL/L (ref 9–17)
AST SERPL-CCNC: 19 U/L
BASOPHILS # BLD: 2 % (ref 0–2)
BASOPHILS ABSOLUTE: 0.1 K/UL (ref 0–0.2)
BILIRUB SERPL-MCNC: 0.38 MG/DL (ref 0.3–1.2)
BUN BLDV-MCNC: 10 MG/DL (ref 6–20)
BUN/CREAT BLD: NORMAL (ref 9–20)
CALCIUM SERPL-MCNC: 8.9 MG/DL (ref 8.6–10.4)
CHLORIDE BLD-SCNC: 100 MMOL/L (ref 98–107)
CO2: 30 MMOL/L (ref 20–31)
CREAT SERPL-MCNC: 1.05 MG/DL (ref 0.7–1.2)
DIFFERENTIAL TYPE: ABNORMAL
DIRECT EXAM: NORMAL
EOSINOPHILS RELATIVE PERCENT: 5 % (ref 0–4)
GFR AFRICAN AMERICAN: >60 ML/MIN
GFR NON-AFRICAN AMERICAN: >60 ML/MIN
GFR SERPL CREATININE-BSD FRML MDRD: NORMAL ML/MIN/{1.73_M2}
GFR SERPL CREATININE-BSD FRML MDRD: NORMAL ML/MIN/{1.73_M2}
GLUCOSE BLD-MCNC: 93 MG/DL (ref 70–99)
HCT VFR BLD CALC: 40.5 % (ref 41–53)
HEMOGLOBIN: 13.6 G/DL (ref 13.5–17.5)
IMMATURE GRANULOCYTES: ABNORMAL %
LIPASE: 16 U/L (ref 13–60)
LYMPHOCYTES # BLD: 23 % (ref 24–44)
Lab: NORMAL
MAGNESIUM: 1.8 MG/DL (ref 1.6–2.6)
MCH RBC QN AUTO: 31 PG (ref 26–34)
MCHC RBC AUTO-ENTMCNC: 33.6 G/DL (ref 31–37)
MCV RBC AUTO: 92.2 FL (ref 80–100)
MONOCYTES # BLD: 7 % (ref 1–7)
NRBC AUTOMATED: ABNORMAL PER 100 WBC
PDW BLD-RTO: 14.3 % (ref 11.5–14.9)
PLATELET # BLD: 190 K/UL (ref 150–450)
PLATELET ESTIMATE: ABNORMAL
PMV BLD AUTO: 8.8 FL (ref 6–12)
POTASSIUM SERPL-SCNC: 4.1 MMOL/L (ref 3.7–5.3)
RBC # BLD: 4.39 M/UL (ref 4.5–5.9)
RBC # BLD: ABNORMAL 10*6/UL
SEG NEUTROPHILS: 63 % (ref 36–66)
SEGMENTED NEUTROPHILS ABSOLUTE COUNT: 4.3 K/UL (ref 1.3–9.1)
SODIUM BLD-SCNC: 139 MMOL/L (ref 135–144)
SPECIMEN DESCRIPTION: NORMAL
SPECIMEN DESCRIPTION: NORMAL
STATUS: NORMAL
TOTAL PROTEIN: 7.1 G/DL (ref 6.4–8.3)
TROPONIN INTERP: NORMAL
TROPONIN T: <0.03 NG/ML
WBC # BLD: 6.8 K/UL (ref 3.5–11)
WBC # BLD: ABNORMAL 10*3/UL

## 2018-01-23 PROCEDURE — 36415 COLL VENOUS BLD VENIPUNCTURE: CPT

## 2018-01-23 PROCEDURE — 6360000004 HC RX CONTRAST MEDICATION: Performed by: EMERGENCY MEDICINE

## 2018-01-23 PROCEDURE — 83735 ASSAY OF MAGNESIUM: CPT

## 2018-01-23 PROCEDURE — 74177 CT ABD & PELVIS W/CONTRAST: CPT

## 2018-01-23 PROCEDURE — 83690 ASSAY OF LIPASE: CPT

## 2018-01-23 PROCEDURE — 2580000003 HC RX 258: Performed by: EMERGENCY MEDICINE

## 2018-01-23 PROCEDURE — 87804 INFLUENZA ASSAY W/OPTIC: CPT

## 2018-01-23 PROCEDURE — 84484 ASSAY OF TROPONIN QUANT: CPT

## 2018-01-23 PROCEDURE — 85025 COMPLETE CBC W/AUTO DIFF WBC: CPT

## 2018-01-23 PROCEDURE — 80053 COMPREHEN METABOLIC PANEL: CPT

## 2018-01-23 PROCEDURE — 71046 X-RAY EXAM CHEST 2 VIEWS: CPT

## 2018-01-23 PROCEDURE — 99285 EMERGENCY DEPT VISIT HI MDM: CPT

## 2018-01-23 RX ORDER — 0.9 % SODIUM CHLORIDE 0.9 %
100 INTRAVENOUS SOLUTION INTRAVENOUS ONCE
Status: COMPLETED | OUTPATIENT
Start: 2018-01-23 | End: 2018-01-23

## 2018-01-23 RX ORDER — 0.9 % SODIUM CHLORIDE 0.9 %
1000 INTRAVENOUS SOLUTION INTRAVENOUS ONCE
Status: COMPLETED | OUTPATIENT
Start: 2018-01-23 | End: 2018-01-23

## 2018-01-23 RX ORDER — ONDANSETRON 2 MG/ML
4 INJECTION INTRAMUSCULAR; INTRAVENOUS ONCE
Status: DISCONTINUED | OUTPATIENT
Start: 2018-01-23 | End: 2018-01-23 | Stop reason: HOSPADM

## 2018-01-23 RX ORDER — ONDANSETRON 4 MG/1
4 TABLET, FILM COATED ORAL EVERY 8 HOURS PRN
Qty: 10 TABLET | Refills: 0 | Status: SHIPPED | OUTPATIENT
Start: 2018-01-23 | End: 2018-01-30

## 2018-01-23 RX ADMIN — SODIUM CHLORIDE 1000 ML: 9 INJECTION, SOLUTION INTRAVENOUS at 13:25

## 2018-01-23 RX ADMIN — SODIUM CHLORIDE 100 ML: 9 INJECTION, SOLUTION INTRAVENOUS at 15:05

## 2018-01-23 RX ADMIN — IOPAMIDOL 100 ML: 755 INJECTION, SOLUTION INTRAVENOUS at 15:05

## 2018-01-23 ASSESSMENT — PAIN DESCRIPTION - ORIENTATION: ORIENTATION: LEFT;UPPER

## 2018-01-23 ASSESSMENT — PAIN DESCRIPTION - ONSET: ONSET: SUDDEN

## 2018-01-23 ASSESSMENT — PAIN DESCRIPTION - PAIN TYPE: TYPE: ACUTE PAIN

## 2018-01-23 ASSESSMENT — PAIN SCALES - GENERAL: PAINLEVEL_OUTOF10: 9

## 2018-01-23 ASSESSMENT — PAIN DESCRIPTION - DESCRIPTORS: DESCRIPTORS: ACHING;STABBING

## 2018-01-23 ASSESSMENT — PAIN DESCRIPTION - LOCATION: LOCATION: ABDOMEN

## 2018-01-23 NOTE — ED PROVIDER NOTES
recognition program.  Efforts were made to edit the dictations but occasionally words are mis-transcribed.)    Ankit Espinosa DO  Attending Emergency Physician          Ankit Espinosa DO  01/23/18 1557

## 2018-01-24 ENCOUNTER — HOSPITAL ENCOUNTER (OUTPATIENT)
Age: 58
Setting detail: SPECIMEN
Discharge: HOME | End: 2018-01-24
Payer: MEDICARE

## 2018-01-24 LAB
AMPHETAMINE SCREEN URINE: NEGATIVE
BARBITURATE SCREEN URINE: NEGATIVE
BENZODIAZEPINE SCREEN, URINE: NEGATIVE
BUPRENORPHINE URINE: ABNORMAL
CANNABINOID SCREEN URINE: POSITIVE
COCAINE METABOLITE, URINE: NEGATIVE
MDMA URINE: ABNORMAL
METHADONE SCREEN, URINE: NEGATIVE
METHAMPHETAMINE, URINE: ABNORMAL
OPIATES, URINE: NEGATIVE
OXYCODONE SCREEN URINE: POSITIVE
PHENCYCLIDINE, URINE: NEGATIVE
PROPOXYPHENE, URINE: ABNORMAL
TEST INFORMATION: ABNORMAL
TRICYCLIC ANTIDEPRESSANTS, UR: ABNORMAL

## 2018-01-30 ENCOUNTER — HOSPITAL ENCOUNTER (OUTPATIENT)
Facility: CLINIC | Age: 58
Discharge: HOME | End: 2018-02-01
Payer: MEDICARE

## 2018-01-30 ENCOUNTER — HOSPITAL ENCOUNTER (OUTPATIENT)
Dept: GENERAL RADIOLOGY | Facility: CLINIC | Age: 58
Discharge: HOME | End: 2018-02-01
Payer: MEDICARE

## 2018-01-30 DIAGNOSIS — M25.512 CHRONIC LEFT SHOULDER PAIN: ICD-10-CM

## 2018-01-30 DIAGNOSIS — M25.511 CHRONIC RIGHT SHOULDER PAIN: ICD-10-CM

## 2018-01-30 DIAGNOSIS — G89.29 CHRONIC LEFT SHOULDER PAIN: ICD-10-CM

## 2018-01-30 DIAGNOSIS — G89.29 CHRONIC RIGHT SHOULDER PAIN: ICD-10-CM

## 2018-01-30 PROCEDURE — 73030 X-RAY EXAM OF SHOULDER: CPT

## 2018-02-05 ENCOUNTER — HOSPITAL ENCOUNTER (OUTPATIENT)
Dept: PHYSICAL THERAPY | Age: 58
Setting detail: THERAPIES SERIES
Discharge: HOME | End: 2018-02-05
Payer: MEDICARE

## 2018-02-05 PROCEDURE — 97162 PT EVAL MOD COMPLEX 30 MIN: CPT

## 2018-02-05 PROCEDURE — G8984 CARRY CURRENT STATUS: HCPCS

## 2018-02-05 PROCEDURE — G8986 CARRY D/C STATUS: HCPCS

## 2018-02-05 PROCEDURE — 97110 THERAPEUTIC EXERCISES: CPT

## 2018-02-05 PROCEDURE — G8985 CARRY GOAL STATUS: HCPCS

## 2018-02-05 ASSESSMENT — PAIN DESCRIPTION - FREQUENCY: FREQUENCY: CONTINUOUS

## 2018-02-05 ASSESSMENT — PAIN DESCRIPTION - DESCRIPTORS: DESCRIPTORS: CONSTANT

## 2018-02-05 ASSESSMENT — PAIN SCALES - GENERAL: PAINLEVEL_OUTOF10: 7

## 2018-02-05 ASSESSMENT — PAIN DESCRIPTION - ORIENTATION: ORIENTATION: RIGHT;LEFT

## 2018-02-05 ASSESSMENT — PAIN DESCRIPTION - LOCATION: LOCATION: SHOULDER

## 2018-02-05 ASSESSMENT — PAIN DESCRIPTION - PAIN TYPE: TYPE: CHRONIC PAIN

## 2018-02-05 NOTE — PROGRESS NOTES
Physical Therapy  Initial Assessment  Date: 2018  Patient Name: Nando Givens  MRN: [id number]  : 1960     Treatment Diagnosis: Dec ROM, MM weakness, postural deficits      Subjective   General  Referring Practitioner:  Sharath Latham NP  Referral Date : 18  Diagnosis: M54.5, G89.29 (ICD-10-CM) - Chronic bilateral low back pain without sciatica,  M79.7 (ICD-10-CM) - Fibromyalgia, M25.511, G89.29 (ICD-10-CM) - Chronic right shoulder pain, M25.512, G89.29 (ICD-10-CM) - Chronic left shoulder pain     General Comment  Comments: Patient reports having open heart surgery and has had bilateral UE pain since that time, HTN, heart proplems, fibromyalgia, LBP  PT Visit Information  Onset Date: 12  PT Insurance Information: Medicare  Plan of Care/Certification Expiration Date: 18    Subjective  Subjective: Bilateral dull ache with intermittent sharp pain that travels down bilateral UE worse with lifting difficulty to find positions of comfort sensitive to touch and movement    Pain Screening  Patient Currently in Pain: Yes  Pain Assessment  Pain Assessment: 0-10  Pain Level: 7  Pain Type: Chronic pain  Pain Location: Shoulder  Pain Orientation: Right;Left  Pain Descriptors: Constant  Pain Frequency: Continuous  Vital Signs  Patient Currently in Pain: Yes    Objective     Observation/Palpation  Posture: Fair (FHP, bilateral rounded shoulders, R scapula elevated, reduced thoracic)  Palpation: all bilateral  RCT    AROM RUE (degrees)  RUE AROM :  (posterior capsul tightness)  RUE General AROM: upper trap hyper active altered mechanics, scapulohumeral rhythum defiicit, elevation with shoulder shruggs  R Shoulder Flexion 0-180: 132 (155)  R Shoulder ABduction 0-180: 100 (135)  AROM LUE (degrees)  LUE AROM :  (posterior and superior capsular tightness)  LUE General AROM: upper trap hyper active altered mechanics, scapulohumeral rhythum defiicit, elevation with shoulder shruggs  L Shoulder Flexion 0-180: 130 (160)  L Shoulder ABduction 0-180: 90 (124)  Spine  Thoracic: minimal rotational deficits bilateral    Strength RUE  Comment: mid trap-3/5 lower trap 4/5 rhomboids 4/5  R Shoulder Flexion: 4/5  R Shoulder ABduction: 4/5  R Shoulder Internal Rotation: 4/5  R Shoulder External Rotation: 4/5  Strength LUE  Comment: mid trap-3/5 lower trap 4/5 rhomboids 4/5  L Shoulder Flexion: 4/5  L Shoulder ABduction: 4/5  L Shoulder Internal Rotation: 4/5  L Shoulder External Rotation: 4/5     Additional Measures  Special Tests: Acevedo Kinnedy + , Neer +, Speeds +     Exercises  Exercise 1: cane exercises  Exercise 2: I/S ice 2x/day 15-20mins    Assessment   Conditions Requiring Skilled Therapeutic Intervention  Treatment Diagnosis: Dec ROM, MM weakness, postural deficits  Prognosis: Good  Decision Making: Medium Complexity  REQUIRES PT FOLLOW UP: Yes  Activity Tolerance  Activity Tolerance: Patient Tolerated treatment well    G-Code  PT G-Codes  Functional Assessment Tool Used: UEFI  Score: 33.75  Functional Limitation: Carrying, moving and handling objects  Carrying, Moving and Handling Objects Current Status (): At least 40 percent but less than 60 percent impaired, limited or restricted  Carrying, Moving and Handling Objects Discharge Status (): At least 20 percent but less than 40 percent impaired, limited or restricted    OutComes Score  UEFS Score: 33.75    Goals  Short term goals  Time Frame for Short term goals: 10 visits  Short term goal 1: UEFI=40  Short term goal 2: shoulder flex and Abd= 160  Short term goal 3: IND and compliant with HEP  Short term goal 4: 3/10 pain  Long term goals  Time Frame for Long term goals : 18 visits  Long term goal 1: 1/10 pain  Long term goal 2: full ROM bilateral shoulders  Long term goal 3: UEFI=75     Therapy Time   Individual Concurrent Group Co-treatment   Time In 0800         Time Out 0900         Minutes 60           Patient Goals: To help reduce

## 2018-02-12 ENCOUNTER — HOSPITAL ENCOUNTER (OUTPATIENT)
Dept: PHYSICAL THERAPY | Age: 58
Setting detail: THERAPIES SERIES
Discharge: HOME | End: 2018-02-12
Payer: MEDICARE

## 2018-02-12 PROCEDURE — 97110 THERAPEUTIC EXERCISES: CPT

## 2018-02-12 ASSESSMENT — PAIN DESCRIPTION - ORIENTATION: ORIENTATION: LEFT;RIGHT

## 2018-02-12 ASSESSMENT — PAIN DESCRIPTION - LOCATION: LOCATION: SHOULDER

## 2018-02-12 ASSESSMENT — PAIN DESCRIPTION - PAIN TYPE: TYPE: CHRONIC PAIN

## 2018-02-12 ASSESSMENT — PAIN SCALES - GENERAL: PAINLEVEL_OUTOF10: 7

## 2018-02-12 NOTE — PROGRESS NOTES
[address]   Outpatient Physical Therapy  [address] [facility]. [address]  Phone: [phone]  Fax: [phone]    Daily Progress Note    Date: 18    Patient Name: Rina Thrasher        MRN: [id number]  Account: [de-identified] : 1960      General Information:  Referring Practitioner:  Annabel Mcbride NP  Referral Date : 18  Diagnosis: M54.5, G89.29 (ICD-10-CM) - Chronic bilateral low back pain without sciatica,  M79.7 (ICD-10-CM) - Fibromyalgia, M25.511, G89.29 (ICD-10-CM) - Chronic right shoulder pain, M25.512, G89.29 (ICD-10-CM) - Chronic left shoulder pain   Onset Date: 12  PT Insurance Information: Medicare  Plan of Care/Certification Expiration Date: 18    Subjective: Patient reports increased pain due to weather         Pain:  Patient Currently in Pain: Yes  Pain Assessment: 0-10  Pain Level: 7  Pain Type: Chronic pain  Pain Location: Shoulder  Pain Orientation: Left;Right     Objective:  Exercise 1: supine scaption 1# 3x10  Exercise 2: supine protraction  3x10 4# wt  Exercise 3: (P) band pull down blue 3 directions x10ea way  Exercise 4: (P) sitting t band pull scapular retraction 3x10     Comment: modifications needed due to c/o discomfort.  Tactile and VC's to correct positioning of each exercise     Assessment:  Treatment Diagnosis: (P) Dec ROM, MM weakness, postural deficits  REQUIRES PT FOLLOW UP: (P) Yes  Activity Tolerance: (P) Patient Tolerated treatment well       Goals:  Patient Goals:     Short Term Goals:  Time Frame for Short term goals: (P) 10 visits  Short term goal 1: (P) UEFI=40  Short term goal 2: (P) shoulder flex and Abd= 160  Short term goal 3: (P) IND and compliant with HEP  Short term goal 4: (P) 3/10 pain  Long Term Goals:  Time Frame for Long term goals : (P) 18 visits  Long term goal 1: (P) 1/10 pain  Long term goal 2: (P) full ROM bilateral shoulders  Long term goal 3: (P) UEFI=75    Plan:  Plan: (P) Continue with current plan       Therapy

## 2018-02-20 ENCOUNTER — HOSPITAL ENCOUNTER (OUTPATIENT)
Dept: PHYSICAL THERAPY | Age: 58
Setting detail: THERAPIES SERIES
Discharge: HOME | End: 2018-02-20
Payer: MEDICARE

## 2018-02-20 PROCEDURE — 97110 THERAPEUTIC EXERCISES: CPT

## 2018-02-20 ASSESSMENT — PAIN DESCRIPTION - LOCATION: LOCATION: SHOULDER

## 2018-02-20 ASSESSMENT — PAIN DESCRIPTION - PAIN TYPE: TYPE: CHRONIC PAIN

## 2018-02-20 ASSESSMENT — PAIN DESCRIPTION - ORIENTATION: ORIENTATION: RIGHT;LEFT

## 2018-02-20 ASSESSMENT — PAIN SCALES - GENERAL: PAINLEVEL_OUTOF10: 7

## 2018-02-20 NOTE — PROGRESS NOTES
[address]   Outpatient Physical Therapy  [address] [facility].  [address]  Phone: [phone]  Fax: [phone]    Daily Progress Note    Date: 18    Patient Name: Reyes Carrier        MRN: [id number]  Account: [de-identified] : 1960      General Information:  Referring Practitioner:  Nohemi Terry NP  Referral Date : 18  Diagnosis: M54.5, G89.29 (ICD-10-CM) - Chronic bilateral low back pain without sciatica,  M79.7 (ICD-10-CM) - Fibromyalgia, M25.511, G89.29 (ICD-10-CM) - Chronic right shoulder pain, M25.512, G89.29 (ICD-10-CM) - Chronic left shoulder pain   Onset Date: 12  PT Insurance Information: Medicare  Total # of Visits to Date: 3  Plan of Care/Certification Expiration Date: 18  No Show: 1    Subjective: Patient reports no changes since last visit      Pain:     Pain Assessment: 0-10  Pain Level: 7  Pain Type: Chronic pain  Pain Location: (P) Shoulder  Pain Orientation: (P) Right;Left       Objective:  Exercise 2: supine protraction  2x15 4# wt  Exercise 3: band pull down blue 3 directions x10ea way  Exercise 4: sitting t band pull scapular retraction 3x10  Exercise 5: supine alphabet with protraction 2#   Exercise 6: prone mid trap 2# 3x10 bilateral  Exercise 7: lower trap 1# 3x10 bilateral  Exercise 8: rhomboids 1# 3x10 bilateral  Exercise 9: mid back series  Exercise 10: PROM    Comment: VC's to keep ROM in pain free range and focus on scapular movement with exercises     Assessment:  Treatment Diagnosis: (P) Dec ROM, MM weakness, postural deficits  REQUIRES PT FOLLOW UP: (P) Yes  Activity Tolerance: (P) Patient Tolerated treatment well       Goals:     Short Term Goals:  Time Frame for Short term goals: (P) 10 visits  Short term goal 1: (P) UEFI=40  Short term goal 2: (P) shoulder flex and Abd= 160  Short term goal 3: (P) IND and compliant with HEP  Short term goal 4: (P) 3/10 pain  Long Term Goals:  Time Frame for Long term goals : (P) 18 visits  Long term

## 2018-02-22 ENCOUNTER — HOSPITAL ENCOUNTER (OUTPATIENT)
Dept: PHYSICAL THERAPY | Age: 58
Setting detail: THERAPIES SERIES
Discharge: HOME | End: 2018-02-22
Payer: MEDICARE

## 2018-02-22 PROCEDURE — 97110 THERAPEUTIC EXERCISES: CPT

## 2018-02-22 ASSESSMENT — PAIN DESCRIPTION - LOCATION: LOCATION: SHOULDER

## 2018-02-22 ASSESSMENT — PAIN DESCRIPTION - PAIN TYPE: TYPE: CHRONIC PAIN

## 2018-02-22 ASSESSMENT — PAIN SCALES - GENERAL: PAINLEVEL_OUTOF10: 7

## 2018-02-22 ASSESSMENT — PAIN DESCRIPTION - ORIENTATION: ORIENTATION: RIGHT;LEFT

## 2018-02-22 NOTE — PROGRESS NOTES
term goal 2: (P) full ROM bilateral shoulders  Long term goal 3: (P) UEFI=75    Plan:  Plan: (P) Continue with current plan     Therapy Time: 106-000    Treatment Charges: Minutes Units   []  Ultrasound     []  Electrical-Stim     []  Iontophoresis     []  Traction     []  Massage       []  Eval     []  Gait     [x]  Ther Exercise 45   3   []  Manual Therapy       []  Ther Activities       []  Aquatics     []  Neuro Re-Ed       []  Other       Total Treatment Time: [facility] - [facility], PT

## 2018-02-27 ENCOUNTER — HOSPITAL ENCOUNTER (OUTPATIENT)
Dept: PHYSICAL THERAPY | Age: 58
Setting detail: THERAPIES SERIES
Discharge: HOME | End: 2018-02-27
Payer: MEDICARE

## 2018-02-27 PROCEDURE — 97110 THERAPEUTIC EXERCISES: CPT

## 2018-02-28 ASSESSMENT — PAIN SCALES - GENERAL: PAINLEVEL_OUTOF10: 7

## 2018-02-28 ASSESSMENT — PAIN DESCRIPTION - ORIENTATION: ORIENTATION: LEFT;RIGHT

## 2018-02-28 ASSESSMENT — PAIN DESCRIPTION - PAIN TYPE: TYPE: CHRONIC PAIN

## 2018-02-28 ASSESSMENT — PAIN DESCRIPTION - LOCATION: LOCATION: SHOULDER

## 2018-02-28 NOTE — PROGRESS NOTES
goal 3: (P) IND and compliant with HEP  Short term goal 4: (P) 3/10 pain  Long Term Goals:  Time Frame for Long term goals : (P) 18 visits  Long term goal 1: (P) 1/10 pain  Long term goal 2: (P) full ROM bilateral shoulders  Long term goal 3: (P) UEFI=75    Plan:  Plan: (P) Continue with current plan       Therapy Time:815-185    Treatment Charges: Minutes Units   []  Ultrasound     []  Electrical-Stim     []  Iontophoresis     []  Traction     []  Massage       []  Eval     []  Gait     [x]  Ther Exercise 45 3    []  Manual Therapy       []  Ther Activities       []  Aquatics     []  Neuro Re-Ed       []  Other       Total Treatment Time: [address] [facility] - [facility], PT

## 2018-04-04 ENCOUNTER — HOSPITAL ENCOUNTER (OUTPATIENT)
Age: 58
Setting detail: SPECIMEN
Discharge: HOME | End: 2018-04-04
Payer: MEDICARE

## 2018-04-04 DIAGNOSIS — I10 ESSENTIAL HYPERTENSION: ICD-10-CM

## 2018-04-04 DIAGNOSIS — Z12.5 PROSTATE CANCER SCREENING: ICD-10-CM

## 2018-04-04 LAB
ALBUMIN SERPL-MCNC: 4 G/DL (ref 3.5–5.2)
ALBUMIN/GLOBULIN RATIO: 1.3 (ref 1–2.5)
ALP BLD-CCNC: 81 U/L (ref 40–129)
ALT SERPL-CCNC: 9 U/L (ref 5–41)
ANION GAP SERPL CALCULATED.3IONS-SCNC: 9 MMOL/L (ref 9–17)
AST SERPL-CCNC: 12 U/L
BILIRUB SERPL-MCNC: 0.42 MG/DL (ref 0.3–1.2)
BUN BLDV-MCNC: 11 MG/DL (ref 6–20)
BUN/CREAT BLD: ABNORMAL (ref 9–20)
CALCIUM SERPL-MCNC: 8.9 MG/DL (ref 8.6–10.4)
CHLORIDE BLD-SCNC: 102 MMOL/L (ref 98–107)
CHOLESTEROL/HDL RATIO: 4.1
CHOLESTEROL: 179 MG/DL
CO2: 28 MMOL/L (ref 20–31)
CREAT SERPL-MCNC: 0.79 MG/DL (ref 0.7–1.2)
GFR AFRICAN AMERICAN: >60 ML/MIN
GFR NON-AFRICAN AMERICAN: >60 ML/MIN
GFR SERPL CREATININE-BSD FRML MDRD: ABNORMAL ML/MIN/{1.73_M2}
GFR SERPL CREATININE-BSD FRML MDRD: ABNORMAL ML/MIN/{1.73_M2}
GLUCOSE BLD-MCNC: 68 MG/DL (ref 70–99)
HCT VFR BLD CALC: 44.5 % (ref 40.7–50.3)
HDLC SERPL-MCNC: 44 MG/DL
HEMOGLOBIN: 14.2 G/DL (ref 13–17)
LDL CHOLESTEROL: 123 MG/DL (ref 0–130)
MCH RBC QN AUTO: 29.8 PG (ref 25.2–33.5)
MCHC RBC AUTO-ENTMCNC: 31.9 G/DL (ref 28.4–34.8)
MCV RBC AUTO: 93.3 FL (ref 82.6–102.9)
NRBC AUTOMATED: 0 PER 100 WBC
PDW BLD-RTO: 13.9 % (ref 11.8–14.4)
PLATELET # BLD: 221 K/UL (ref 138–453)
PMV BLD AUTO: 11.4 FL (ref 8.1–13.5)
POTASSIUM SERPL-SCNC: 4.1 MMOL/L (ref 3.7–5.3)
PROSTATE SPECIFIC ANTIGEN: 0.52 UG/L
RBC # BLD: 4.77 M/UL (ref 4.21–5.77)
SODIUM BLD-SCNC: 139 MMOL/L (ref 135–144)
TOTAL PROTEIN: 7.2 G/DL (ref 6.4–8.3)
TRIGL SERPL-MCNC: 61 MG/DL
VLDLC SERPL CALC-MCNC: NORMAL MG/DL (ref 1–30)
WBC # BLD: 8.6 K/UL (ref 3.5–11.3)

## 2018-09-26 ENCOUNTER — HOSPITAL ENCOUNTER (OUTPATIENT)
Age: 58
Setting detail: OBSERVATION
Discharge: HOME | End: 2018-09-27
Attending: EMERGENCY MEDICINE | Admitting: EMERGENCY MEDICINE
Payer: MEDICARE

## 2018-09-26 ENCOUNTER — APPOINTMENT (OUTPATIENT)
Dept: CT IMAGING | Age: 58
End: 2018-09-26
Payer: MEDICARE

## 2018-09-26 DIAGNOSIS — R07.9 CHEST PAIN, UNSPECIFIED TYPE: Primary | ICD-10-CM

## 2018-09-26 LAB
ABSOLUTE EOS #: 0.45 K/UL (ref 0–0.44)
ABSOLUTE IMMATURE GRANULOCYTE: 0.03 K/UL (ref 0–0.3)
ABSOLUTE LYMPH #: 2.29 K/UL (ref 1.1–3.7)
ABSOLUTE MONO #: 0.73 K/UL (ref 0.1–1.2)
ALBUMIN SERPL-MCNC: 3.8 G/DL (ref 3.5–5.2)
ALBUMIN/GLOBULIN RATIO: 1.2 (ref 1–2.5)
ALP BLD-CCNC: 76 U/L (ref 40–129)
ALT SERPL-CCNC: 8 U/L (ref 5–41)
ANION GAP SERPL CALCULATED.3IONS-SCNC: 12 MMOL/L (ref 9–17)
AST SERPL-CCNC: 13 U/L
BASOPHILS # BLD: 1 % (ref 0–2)
BASOPHILS ABSOLUTE: 0.09 K/UL (ref 0–0.2)
BILIRUB SERPL-MCNC: 0.37 MG/DL (ref 0.3–1.2)
BUN BLDV-MCNC: 13 MG/DL (ref 6–20)
BUN/CREAT BLD: NORMAL (ref 9–20)
CALCIUM SERPL-MCNC: 9 MG/DL (ref 8.6–10.4)
CHLORIDE BLD-SCNC: 101 MMOL/L (ref 98–107)
CO2: 25 MMOL/L (ref 20–31)
CREAT SERPL-MCNC: 0.88 MG/DL (ref 0.7–1.2)
DIFFERENTIAL TYPE: ABNORMAL
EOSINOPHILS RELATIVE PERCENT: 5 % (ref 1–4)
GFR AFRICAN AMERICAN: >60 ML/MIN
GFR NON-AFRICAN AMERICAN: >60 ML/MIN
GFR SERPL CREATININE-BSD FRML MDRD: NORMAL ML/MIN/{1.73_M2}
GFR SERPL CREATININE-BSD FRML MDRD: NORMAL ML/MIN/{1.73_M2}
GLUCOSE BLD-MCNC: 88 MG/DL (ref 70–99)
HCT VFR BLD CALC: 43.2 % (ref 40.7–50.3)
HEMOGLOBIN: 14 G/DL (ref 13–17)
IMMATURE GRANULOCYTES: 0 %
LIPASE: 30 U/L (ref 13–60)
LYMPHOCYTES # BLD: 25 % (ref 24–43)
MCH RBC QN AUTO: 29.9 PG (ref 25.2–33.5)
MCHC RBC AUTO-ENTMCNC: 32.4 G/DL (ref 28.4–34.8)
MCV RBC AUTO: 92.3 FL (ref 82.6–102.9)
MONOCYTES # BLD: 8 % (ref 3–12)
NRBC AUTOMATED: 0 PER 100 WBC
PDW BLD-RTO: 13.9 % (ref 11.8–14.4)
PLATELET # BLD: 222 K/UL (ref 138–453)
PLATELET ESTIMATE: ABNORMAL
PMV BLD AUTO: 11.1 FL (ref 8.1–13.5)
POC TROPONIN I: 0 NG/ML (ref 0–0.1)
POC TROPONIN I: 0 NG/ML (ref 0–0.1)
POC TROPONIN INTERP: NORMAL
POC TROPONIN INTERP: NORMAL
POTASSIUM SERPL-SCNC: 4 MMOL/L (ref 3.7–5.3)
RBC # BLD: 4.68 M/UL (ref 4.21–5.77)
RBC # BLD: ABNORMAL 10*6/UL
SEG NEUTROPHILS: 61 % (ref 36–65)
SEGMENTED NEUTROPHILS ABSOLUTE COUNT: 5.54 K/UL (ref 1.5–8.1)
SODIUM BLD-SCNC: 138 MMOL/L (ref 135–144)
TOTAL PROTEIN: 7 G/DL (ref 6.4–8.3)
WBC # BLD: 9.1 K/UL (ref 3.5–11.3)
WBC # BLD: ABNORMAL 10*3/UL

## 2018-09-26 PROCEDURE — 6360000004 HC RX CONTRAST MEDICATION: Performed by: EMERGENCY MEDICINE

## 2018-09-26 PROCEDURE — 96374 THER/PROPH/DIAG INJ IV PUSH: CPT

## 2018-09-26 PROCEDURE — G0378 HOSPITAL OBSERVATION PER HR: HCPCS

## 2018-09-26 PROCEDURE — 83690 ASSAY OF LIPASE: CPT

## 2018-09-26 PROCEDURE — 93005 ELECTROCARDIOGRAM TRACING: CPT

## 2018-09-26 PROCEDURE — 6360000002 HC RX W HCPCS: Performed by: EMERGENCY MEDICINE

## 2018-09-26 PROCEDURE — 94640 AIRWAY INHALATION TREATMENT: CPT

## 2018-09-26 PROCEDURE — 96375 TX/PRO/DX INJ NEW DRUG ADDON: CPT

## 2018-09-26 PROCEDURE — 84484 ASSAY OF TROPONIN QUANT: CPT

## 2018-09-26 PROCEDURE — 2500000003 HC RX 250 WO HCPCS: Performed by: EMERGENCY MEDICINE

## 2018-09-26 PROCEDURE — 99285 EMERGENCY DEPT VISIT HI MDM: CPT

## 2018-09-26 PROCEDURE — 71275 CT ANGIOGRAPHY CHEST: CPT

## 2018-09-26 PROCEDURE — 80053 COMPREHEN METABOLIC PANEL: CPT

## 2018-09-26 PROCEDURE — 94664 DEMO&/EVAL PT USE INHALER: CPT

## 2018-09-26 PROCEDURE — 6370000000 HC RX 637 (ALT 250 FOR IP): Performed by: EMERGENCY MEDICINE

## 2018-09-26 PROCEDURE — 96376 TX/PRO/DX INJ SAME DRUG ADON: CPT

## 2018-09-26 PROCEDURE — 85025 COMPLETE CBC W/AUTO DIFF WBC: CPT

## 2018-09-26 RX ORDER — MORPHINE SULFATE 4 MG/ML
4 INJECTION, SOLUTION INTRAMUSCULAR; INTRAVENOUS ONCE
Status: COMPLETED | OUTPATIENT
Start: 2018-09-26 | End: 2018-09-26

## 2018-09-26 RX ORDER — GABAPENTIN 600 MG/1
600 TABLET ORAL
Status: DISCONTINUED | OUTPATIENT
Start: 2018-09-26 | End: 2018-09-27 | Stop reason: HOSPADM

## 2018-09-26 RX ORDER — ASPIRIN 81 MG/1
324 TABLET, CHEWABLE ORAL ONCE
Status: COMPLETED | OUTPATIENT
Start: 2018-09-26 | End: 2018-09-26

## 2018-09-26 RX ORDER — SODIUM CHLORIDE 0.9 % (FLUSH) 0.9 %
10 SYRINGE (ML) INJECTION EVERY 12 HOURS SCHEDULED
Status: DISCONTINUED | OUTPATIENT
Start: 2018-09-26 | End: 2018-09-27 | Stop reason: HOSPADM

## 2018-09-26 RX ORDER — AMLODIPINE BESYLATE 5 MG/1
5 TABLET ORAL DAILY
Status: DISCONTINUED | OUTPATIENT
Start: 2018-09-27 | End: 2018-09-27 | Stop reason: HOSPADM

## 2018-09-26 RX ORDER — SODIUM CHLORIDE 0.9 % (FLUSH) 0.9 %
10 SYRINGE (ML) INJECTION PRN
Status: DISCONTINUED | OUTPATIENT
Start: 2018-09-26 | End: 2018-09-27 | Stop reason: HOSPADM

## 2018-09-26 RX ORDER — LABETALOL HYDROCHLORIDE 5 MG/ML
10 INJECTION, SOLUTION INTRAVENOUS ONCE
Status: COMPLETED | OUTPATIENT
Start: 2018-09-26 | End: 2018-09-26

## 2018-09-26 RX ORDER — OXYCODONE HYDROCHLORIDE 5 MG/1
10 TABLET ORAL EVERY 4 HOURS PRN
Status: DISCONTINUED | OUTPATIENT
Start: 2018-09-26 | End: 2018-09-27 | Stop reason: HOSPADM

## 2018-09-26 RX ORDER — ALBUTEROL SULFATE 2.5 MG/3ML
5 SOLUTION RESPIRATORY (INHALATION)
Status: DISCONTINUED | OUTPATIENT
Start: 2018-09-26 | End: 2018-09-27 | Stop reason: HOSPADM

## 2018-09-26 RX ORDER — OXYCODONE HYDROCHLORIDE 5 MG/1
5 TABLET ORAL EVERY 4 HOURS PRN
Status: DISCONTINUED | OUTPATIENT
Start: 2018-09-26 | End: 2018-09-27 | Stop reason: HOSPADM

## 2018-09-26 RX ORDER — ONDANSETRON 4 MG/1
4 TABLET, ORALLY DISINTEGRATING ORAL EVERY 8 HOURS PRN
Status: DISCONTINUED | OUTPATIENT
Start: 2018-09-26 | End: 2018-09-27 | Stop reason: HOSPADM

## 2018-09-26 RX ORDER — IPRATROPIUM BROMIDE AND ALBUTEROL SULFATE 2.5; .5 MG/3ML; MG/3ML
1 SOLUTION RESPIRATORY (INHALATION)
Status: DISCONTINUED | OUTPATIENT
Start: 2018-09-26 | End: 2018-09-27 | Stop reason: HOSPADM

## 2018-09-26 RX ADMIN — MORPHINE SULFATE 4 MG: 4 INJECTION INTRAVENOUS at 20:05

## 2018-09-26 RX ADMIN — LABETALOL HYDROCHLORIDE 10 MG: 5 INJECTION, SOLUTION INTRAVENOUS at 19:30

## 2018-09-26 RX ADMIN — ALBUTEROL SULFATE 5 MG: 2.5 SOLUTION RESPIRATORY (INHALATION) at 21:35

## 2018-09-26 RX ADMIN — OXYCODONE HYDROCHLORIDE 10 MG: 5 TABLET ORAL at 23:39

## 2018-09-26 RX ADMIN — MORPHINE SULFATE 4 MG: 4 INJECTION INTRAVENOUS at 19:30

## 2018-09-26 RX ADMIN — IOPAMIDOL 75 ML: 755 INJECTION, SOLUTION INTRAVENOUS at 20:17

## 2018-09-26 RX ADMIN — ASPIRIN 81 MG 324 MG: 81 TABLET ORAL at 21:51

## 2018-09-26 ASSESSMENT — PAIN SCALES - GENERAL
PAINLEVEL_OUTOF10: 9
PAINLEVEL_OUTOF10: 7
PAINLEVEL_OUTOF10: 9
PAINLEVEL_OUTOF10: 8
PAINLEVEL_OUTOF10: 7

## 2018-09-26 ASSESSMENT — PAIN DESCRIPTION - PAIN TYPE: TYPE: ACUTE PAIN

## 2018-09-26 ASSESSMENT — PAIN DESCRIPTION - LOCATION: LOCATION: RIB CAGE;CHEST

## 2018-09-26 ASSESSMENT — PAIN DESCRIPTION - ORIENTATION: ORIENTATION: RIGHT;LEFT

## 2018-09-26 NOTE — ED PROVIDER NOTES
[facility]  Emergency Department Encounter  Emergency Medicine Resident     Pt Name: Marie Agee  MRN: [id number]  Lilliamgfglo 1960  Date of evaluation: 9/26/18  PCP:  TAYLOR Uribe CNP    CHIEF COMPLAINT       Chief Complaint   Patient presents with    Chest Pain    Shortness of Breath       HISTORY OF PRESENT ILLNESS  (Location/Symptom, Timing/Onset, Context/Setting, Quality, Duration, Modifying Factors, Severity.)      Marie Agee is a 62 y.o. male who presents with Right-sided rib pain and left-sided chest pain the left shoulder. Patient states symptoms started approximately 4 days ago. Patient states pain is sharp and pressure like, moderate to severe intensity. Associated with shortness of breath and diaphoresis. Patient is also a productive cough. Patient is a former smoker with a history of COPD. Patient denies any nausea, vomiting, abdominal pain. Patient does have history of aorta repair by Dr. Dakota Sanchez 2012 for aneurysm. Patient denies any recent follow-up examinations. PAST MEDICAL / SURGICAL / SOCIAL / FAMILY HISTORY      has a past medical history of Abdominal pain; Ascending aortic aneurysm ([address] Chronic pain; COPD (chronic obstructive pulmonary disease) ([address] Dyslipidemia; GERD (gastroesophageal reflux disease); Hepatitis A; Hypertension; Other accident; Pneumonia; Rectal bleeding; Rectal pain; S/P thoracic aortic aneurysm repair; and Spinal fracture. 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     Social History     Social History    Marital status:      Spouse name: N/A    Number of children: N/A    Years of education: N/A     Occupational History    disabled--      Social History Main Topics    Smoking status: Former Smoker     Packs/day: 1.00     Years: 22.00     Start date: 2/27/1972     Quit date: 8/29/1990    Smokeless tobacco: Never Used    Alcohol use No      Comment: stopped drinking Set. 2014    Drug use: Yes     Frequency: 3.0 times per week     Types: Marijuana      Comment: marijuana occasionally    Sexual activity: Not on file     Other Topics Concern    Not on file     Social History Narrative    No narrative on file       Family History   Problem Relation Age of Onset    Heart Disease Mother     Diabetes Mother    Shirley Hoar Sister         colon-uterine   Seema Bio Diabetes Sister     Kidney Disease Sister     Diabetes Sister     Diabetes Sister     Diabetes Brother        Allergies:  Aleve [naproxen]; Bactrim [sulfamethoxazole-trimethoprim]; Keflex [cephalexin]; Tramadol; Vicodin [hydrocodone-acetaminophen]; and Lyrica [pregabalin]    Home Medications:  Prior to Admission medications    Medication Sig Start Date End Date Taking? Authorizing Provider   gabapentin (NEURONTIN) 600 MG tablet Take 1 tablet by mouth 5 times daily for 30 days. . 9/17/18 10/17/18  TAYLOR Bella CNP   amLODIPine (NORVASC) 5 MG tablet TAKE 1 TABLET BY MOUTH DAILY 4/4/18   TAYLOR Bella CNP   Handicap Placard [facility] It is my medical opinion that Rosaura Blackman requires a disability parking placard for the following reasons:  He cannot walk 200 feet without stopping to rest.  Duration of need: permanent 1/4/18   TAYLOR Bella CNP   aspirin 81 MG chewable tablet Take 81 mg by mouth daily    Historical Provider, MD       REVIEW OF SYSTEMS    (2-9 systems for level 4, 10 or more for level 5)      Constitutional ROS - No recent fevers, No recent chills  Neurological ROS - No Headache, No Syncope  Opthalmologic ROS- No eye pain, No vision changes   ENT ROS - No sore throat, No congestion  Respiratory ROS - + injection 10 mg    morphine (PF) injection 4 mg    iopamidol (ISOVUE-370) 76 % injection 75 mL    ipratropium-albuterol (DUONEB) nebulizer solution 1 ampule    albuterol (PROVENTIL) nebulizer solution 5 mg    aspirin chewable tablet 324 mg    amLODIPine (NORVASC) tablet 5 mg    gabapentin (NEURONTIN) tablet 600 mg    sodium chloride flush 0.9 % injection 10 mL    sodium chloride flush 0.9 % injection 10 mL    OR Linked Order Group     oxyCODONE (ROXICODONE) immediate release tablet 5 mg     oxyCODONE (ROXICODONE) immediate release tablet 10 mg    ondansetron (ZOFRAN-ODT) disintegrating tablet 4 mg       DDX: Dissection, aneurysm, chest pain unspecified, ACS    DIAGNOSTIC RESULTS / EMERGENCY DEPARTMENT COURSE / MDM     LABS:  Results for orders placed or performed during the hospital encounter of 09/26/18   CBC WITH AUTO DIFFERENTIAL   Result Value Ref Range    WBC 9.1 3.5 - 11.3 k/uL    RBC 4.68 4.21 - 5.77 m/uL    Hemoglobin 14.0 13.0 - 17.0 g/dL    Hematocrit 43.2 40.7 - 50.3 %    MCV 92.3 82.6 - 102.9 fL    MCH 29.9 25.2 - 33.5 pg    MCHC 32.4 28.4 - 34.8 g/dL    RDW 13.9 11.8 - 14.4 %    Platelets 890 772 - 335 k/uL    MPV 11.1 8.1 - 13.5 fL    NRBC Automated 0.0 0.0 per 100 WBC    Differential Type NOT REPORTED     Seg Neutrophils 61 36 - 65 %    Lymphocytes 25 24 - 43 %    Monocytes 8 3 - 12 %    Eosinophils % 5 (H) 1 - 4 %    Basophils 1 0 - 2 %    Immature Granulocytes 0 0 %    Segs Absolute 5.54 1.50 - 8.10 k/uL    Absolute Lymph # 2.29 1.10 - 3.70 k/uL    Absolute Mono # 0.73 0.10 - 1.20 k/uL    Absolute Eos # 0.45 (H) 0.00 - 0.44 k/uL    Basophils # 0.09 0.00 - 0.20 k/uL    Absolute Immature Granulocyte 0.03 0.00 - 0.30 k/uL    WBC Morphology NOT REPORTED     RBC Morphology NOT REPORTED     Platelet Estimate NOT REPORTED    Comprehensive Metabolic Panel   Result Value Ref Range    Glucose 88 70 - 99 mg/dL    BUN 13 6 - 20 mg/dL    CREATININE 0.88 0.70 - 1.20 mg/dL    Bun/Cre Ratio NOT

## 2018-09-26 NOTE — ED PROVIDER NOTES
[address] [facility]     Emergency Department     Faculty Attestation    I performed a history and physical examination of the patient and discussed management with the resident. I reviewed the residents note and agree with the documented findings and plan of care. Any areas of disagreement are noted on the chart. I was personally present for the key portions of any procedures. I have documented in the chart those procedures where I was not present during the key portions. I have reviewed the emergency nurses triage note. I agree with the chief complaint, past medical history, past surgical history, allergies, medications, social and family history as documented unless otherwise noted below. For Physician Assistant/ Nurse Practitioner cases/documentation I have personally evaluated this patient and have completed at least one if not all key elements of the E/M (history, physical exam, and MDM). Additional findings are as noted. Chest clear,  Heart exam normal , no pain or swelling on examination of the lower extremities , equal pulses both wrists , trachea midline. Abdomen is nontender without pulsatile mass or bruit. Chest pain does not radiate to the back , and the pain is not pleuritic. Patient appears comfortable in no distress, skin is warm and dry. Patient is tender to palpation right anterior inferior chest wall. Patient has history of thoracic aortic aneurysm repair.     EKG Interpretation    Interpreted by me    Rhythm: normal sinus   Rate: normal/62  Axis: normal  Ectopy: none  Conduction: normal  ST Segments: no acute change  T Waves: no acute change  Q Waves: none    Clinical Impression: no acute changes and normal EKG    Nallely Ann MD [facility]  Attending Physician                 Paola Johnston MD  09/26/18 3733

## 2018-09-27 ENCOUNTER — APPOINTMENT (OUTPATIENT)
Dept: ULTRASOUND IMAGING | Age: 58
End: 2018-09-27
Payer: MEDICARE

## 2018-09-27 VITALS
RESPIRATION RATE: 18 BRPM | OXYGEN SATURATION: 95 % | HEIGHT: 69 IN | DIASTOLIC BLOOD PRESSURE: 99 MMHG | BODY MASS INDEX: 20.73 KG/M2 | WEIGHT: 140 LBS | SYSTOLIC BLOOD PRESSURE: 124 MMHG | HEART RATE: 57 BPM | TEMPERATURE: 97.8 F

## 2018-09-27 PROCEDURE — 93005 ELECTROCARDIOGRAM TRACING: CPT

## 2018-09-27 PROCEDURE — 6360000002 HC RX W HCPCS: Performed by: EMERGENCY MEDICINE

## 2018-09-27 PROCEDURE — G0378 HOSPITAL OBSERVATION PER HR: HCPCS

## 2018-09-27 PROCEDURE — 2580000003 HC RX 258: Performed by: EMERGENCY MEDICINE

## 2018-09-27 PROCEDURE — 76705 ECHO EXAM OF ABDOMEN: CPT

## 2018-09-27 PROCEDURE — 6370000000 HC RX 637 (ALT 250 FOR IP): Performed by: EMERGENCY MEDICINE

## 2018-09-27 PROCEDURE — 94640 AIRWAY INHALATION TREATMENT: CPT

## 2018-09-27 PROCEDURE — 6370000000 HC RX 637 (ALT 250 FOR IP): Performed by: STUDENT IN AN ORGANIZED HEALTH CARE EDUCATION/TRAINING PROGRAM

## 2018-09-27 RX ORDER — KETOROLAC TROMETHAMINE 15 MG/ML
15 INJECTION, SOLUTION INTRAMUSCULAR; INTRAVENOUS ONCE
Status: DISCONTINUED | OUTPATIENT
Start: 2018-09-27 | End: 2018-09-27 | Stop reason: HOSPADM

## 2018-09-27 RX ORDER — DIPHENHYDRAMINE HCL 25 MG
25 TABLET ORAL ONCE
Status: DISCONTINUED | OUTPATIENT
Start: 2018-09-27 | End: 2018-09-27 | Stop reason: HOSPADM

## 2018-09-27 RX ORDER — AZITHROMYCIN 250 MG/1
250 TABLET, FILM COATED ORAL DAILY
Qty: 10 TABLET | Refills: 0 | Status: SHIPPED | OUTPATIENT
Start: 2018-09-27 | End: 2018-10-07

## 2018-09-27 RX ORDER — ALBUTEROL SULFATE 90 UG/1
2 AEROSOL, METERED RESPIRATORY (INHALATION) EVERY 6 HOURS
Status: DISCONTINUED | OUTPATIENT
Start: 2018-09-27 | End: 2018-09-27 | Stop reason: HOSPADM

## 2018-09-27 RX ORDER — PREDNISONE 20 MG/1
20 TABLET ORAL 2 TIMES DAILY
Qty: 10 TABLET | Refills: 0 | Status: SHIPPED | OUTPATIENT
Start: 2018-09-27 | End: 2018-10-07

## 2018-09-27 RX ORDER — BENZONATATE 100 MG/1
100 CAPSULE ORAL 3 TIMES DAILY PRN
Status: DISCONTINUED | OUTPATIENT
Start: 2018-09-27 | End: 2018-09-27 | Stop reason: HOSPADM

## 2018-09-27 RX ORDER — AZITHROMYCIN 250 MG/1
500 TABLET, FILM COATED ORAL ONCE
Status: COMPLETED | OUTPATIENT
Start: 2018-09-27 | End: 2018-09-27

## 2018-09-27 RX ORDER — IBUPROFEN 400 MG/1
600 TABLET ORAL ONCE
Status: COMPLETED | OUTPATIENT
Start: 2018-09-27 | End: 2018-09-27

## 2018-09-27 RX ADMIN — GABAPENTIN 600 MG: 600 TABLET, FILM COATED ORAL at 08:42

## 2018-09-27 RX ADMIN — AMLODIPINE BESYLATE 5 MG: 5 TABLET ORAL at 08:42

## 2018-09-27 RX ADMIN — Medication 10 ML: at 11:04

## 2018-09-27 RX ADMIN — OXYCODONE HYDROCHLORIDE 10 MG: 5 TABLET ORAL at 04:34

## 2018-09-27 RX ADMIN — Medication 10 ML: at 00:23

## 2018-09-27 RX ADMIN — GABAPENTIN 600 MG: 600 TABLET, FILM COATED ORAL at 00:23

## 2018-09-27 RX ADMIN — ALBUTEROL SULFATE 2 PUFF: 90 AEROSOL, METERED RESPIRATORY (INHALATION) at 09:11

## 2018-09-27 RX ADMIN — GABAPENTIN 600 MG: 600 TABLET, FILM COATED ORAL at 11:05

## 2018-09-27 RX ADMIN — IBUPROFEN 600 MG: 400 TABLET ORAL at 12:16

## 2018-09-27 RX ADMIN — AZITHROMYCIN 500 MG: 250 TABLET, FILM COATED ORAL at 11:04

## 2018-09-27 RX ADMIN — OXYCODONE HYDROCHLORIDE 10 MG: 5 TABLET ORAL at 08:41

## 2018-09-27 RX ADMIN — ALBUTEROL SULFATE 2 PUFF: 90 AEROSOL, METERED RESPIRATORY (INHALATION) at 14:29

## 2018-09-27 ASSESSMENT — PAIN SCALES - GENERAL
PAINLEVEL_OUTOF10: 8
PAINLEVEL_OUTOF10: 7
PAINLEVEL_OUTOF10: 7
PAINLEVEL_OUTOF10: 8
PAINLEVEL_OUTOF10: 7
PAINLEVEL_OUTOF10: 8
PAINLEVEL_OUTOF10: 7

## 2018-09-27 ASSESSMENT — PAIN DESCRIPTION - FREQUENCY
FREQUENCY: INTERMITTENT
FREQUENCY: INTERMITTENT
FREQUENCY: CONTINUOUS

## 2018-09-27 ASSESSMENT — PAIN DESCRIPTION - PROGRESSION
CLINICAL_PROGRESSION: NOT CHANGED

## 2018-09-27 ASSESSMENT — PAIN DESCRIPTION - PAIN TYPE
TYPE: ACUTE PAIN
TYPE: CHRONIC PAIN
TYPE: CHRONIC PAIN

## 2018-09-27 ASSESSMENT — PAIN DESCRIPTION - ORIENTATION
ORIENTATION: RIGHT;LEFT
ORIENTATION: LEFT
ORIENTATION: LEFT

## 2018-09-27 ASSESSMENT — ACTIVITIES OF DAILY LIVING (ADL): EFFECT OF PAIN ON DAILY ACTIVITIES: LIMITING

## 2018-09-27 ASSESSMENT — PAIN DESCRIPTION - DESCRIPTORS
DESCRIPTORS: SHARP
DESCRIPTORS: ACHING
DESCRIPTORS: SHARP

## 2018-09-27 ASSESSMENT — PAIN DESCRIPTION - DIRECTION: RADIATING_TOWARDS: ARM PIT

## 2018-09-27 ASSESSMENT — PAIN DESCRIPTION - LOCATION
LOCATION: CHEST;RIB CAGE
LOCATION: ARM
LOCATION: ARM

## 2018-09-27 ASSESSMENT — PAIN DESCRIPTION - ONSET
ONSET: PROGRESSIVE
ONSET: ON-GOING

## 2018-09-27 NOTE — ED PROVIDER NOTES
[facility] ED  Emergency Department  Faculty Sign-Out Addendum     Care of Gavino Hallman was assumed from previous attending and is being seen for Chest Pain and Shortness of Breath  . The patient's initial evaluation and plan have been discussed with the prior provider who initially evaluated the patient. EMERGENCY DEPARTMENT COURSE / MEDICAL DECISION MAKING:       MEDICATIONS GIVEN:  Orders Placed This Encounter   Medications    morphine (PF) injection 4 mg    labetalol (NORMODYNE;TRANDATE) injection 10 mg    morphine (PF) injection 4 mg    iopamidol (ISOVUE-370) 76 % injection 75 mL    ipratropium-albuterol (DUONEB) nebulizer solution 1 ampule    albuterol (PROVENTIL) nebulizer solution 5 mg    aspirin chewable tablet 324 mg       LABS / RADIOLOGY:     Labs Reviewed   CBC WITH AUTO DIFFERENTIAL - Abnormal; Notable for the following:        Result Value    Eosinophils % 5 (*)     Absolute Eos # 0.45 (*)     All other components within normal limits   COMPREHENSIVE METABOLIC PANEL   LIPASE   POCT TROPONIN   POCT TROPONIN   POCT TROPONIN   POCT TROPONIN       Cta Chest W Wo Contrast    Result Date: 9/26/2018  EXAMINATION: CTA OF THE CHEST WITH AND WITHOUT CONTRAST 9/26/2018 8:22 pm TECHNIQUE: CTA of the chest was performed before and after the administration of intravenous contrast.  Multiplanar reformatted images are provided for review. MIP images are provided for review. Dose modulation, iterative reconstruction, and/or weight based adjustment of the mA/kV was utilized to reduce the radiation dose to as low as reasonably achievable. Curved reformats and 3D volume rendered reformats also presented. COMPARISON: None. HISTORY: ORDERING SYSTEM PROVIDED HISTORY: Hx of aneurysm repair, R/o dissection or worsening aneurysm FINDINGS: Aorta: No evidence of thoracic aortic aneurysm or dissection. No acute abnormality of the aorta.   Stable appearance of subtle change in caliber in the

## 2018-09-27 NOTE — CONSULTS
tablet Take 1 tablet by mouth 5 times daily for 30 days. . 9/17/18 10/17/18  TAYLOR Jenkins CNP   amLODIPine (NORVASC) 5 MG tablet TAKE 1 TABLET BY MOUTH DAILY 4/4/18   TAYLOR Jenkins CNP   Handicap Placard [facility] It is my medical opinion that David Montero requires a disability parking placard for the following reasons:  He cannot walk 200 feet without stopping to rest.  Duration of need: permanent 1/4/18   TAYLOR Jenkins CNP   aspirin 81 MG chewable tablet Take 81 mg by mouth daily    Historical Provider, MD        Current Facility-Administered Medications: ipratropium-albuterol (DUONEB) nebulizer solution 1 ampule, 1 ampule, Inhalation, As Directed RT PRN  albuterol (PROVENTIL) nebulizer solution 5 mg, 5 mg, Nebulization, As Directed RT PRN  amLODIPine (NORVASC) tablet 5 mg, 5 mg, Oral, Daily  gabapentin (NEURONTIN) tablet 600 mg, 600 mg, Oral, 5x Daily  sodium chloride flush 0.9 % injection 10 mL, 10 mL, Intravenous, 2 times per day  sodium chloride flush 0.9 % injection 10 mL, 10 mL, Intravenous, PRN  oxyCODONE (ROXICODONE) immediate release tablet 5 mg, 5 mg, Oral, Q4H PRN **OR** oxyCODONE (ROXICODONE) immediate release tablet 10 mg, 10 mg, Oral, Q4H PRN  ondansetron (ZOFRAN-ODT) disintegrating tablet 4 mg, 4 mg, Oral, Q8H PRN    Allergies:  Aleve [naproxen]; Bactrim [sulfamethoxazole-trimethoprim]; Keflex [cephalexin]; Tramadol; Vicodin [hydrocodone-acetaminophen]; and Lyrica [pregabalin]    Social History:   reports that he quit smoking about 28 years ago. He started smoking about 46 years ago. He has a 22.00 pack-year smoking history. He has never used smokeless tobacco. He reports that he uses drugs, including Marijuana, about 3 times per week. He reports that he does not drink alcohol. Family History: family history includes Cancer in his sister; Diabetes in his brother, mother, sister, sister, and sister; Heart Disease in his mother; Kidney Disease in his sister.  No h/o

## 2018-09-27 NOTE — ED NOTES
2nd CE drawn and running. Ordered aspirin given, pt denies any needs at this time.       [address] [facility] Mikey, RN  09/26/18 3867

## 2018-09-28 LAB
EKG ATRIAL RATE: 52 BPM
EKG ATRIAL RATE: 54 BPM
EKG ATRIAL RATE: 62 BPM
EKG P AXIS: 67 DEGREES
EKG P AXIS: 70 DEGREES
EKG P AXIS: 73 DEGREES
EKG P-R INTERVAL: 160 MS
EKG P-R INTERVAL: 162 MS
EKG P-R INTERVAL: 170 MS
EKG Q-T INTERVAL: 408 MS
EKG Q-T INTERVAL: 438 MS
EKG Q-T INTERVAL: 444 MS
EKG QRS DURATION: 88 MS
EKG QRS DURATION: 94 MS
EKG QRS DURATION: 96 MS
EKG QTC CALCULATION (BAZETT): 407 MS
EKG QTC CALCULATION (BAZETT): 414 MS
EKG QTC CALCULATION (BAZETT): 421 MS
EKG R AXIS: 38 DEGREES
EKG R AXIS: 52 DEGREES
EKG R AXIS: 54 DEGREES
EKG T AXIS: 55 DEGREES
EKG T AXIS: 57 DEGREES
EKG T AXIS: 64 DEGREES
EKG VENTRICULAR RATE: 52 BPM
EKG VENTRICULAR RATE: 54 BPM
EKG VENTRICULAR RATE: 62 BPM

## 2018-11-18 ENCOUNTER — APPOINTMENT (OUTPATIENT)
Dept: GENERAL RADIOLOGY | Age: 58
End: 2018-11-18
Payer: MEDICARE

## 2018-11-18 ENCOUNTER — HOSPITAL ENCOUNTER (OUTPATIENT)
Age: 58
Setting detail: OBSERVATION
Discharge: HOME | End: 2018-11-19
Attending: EMERGENCY MEDICINE | Admitting: INTERNAL MEDICINE
Payer: MEDICARE

## 2018-11-18 DIAGNOSIS — R07.9 CHEST PAIN, UNSPECIFIED TYPE: ICD-10-CM

## 2018-11-18 DIAGNOSIS — G89.29 CHRONIC PAIN OF BOTH SHOULDERS: Primary | ICD-10-CM

## 2018-11-18 DIAGNOSIS — M25.511 CHRONIC PAIN OF BOTH SHOULDERS: Primary | ICD-10-CM

## 2018-11-18 DIAGNOSIS — M25.512 CHRONIC PAIN OF BOTH SHOULDERS: Primary | ICD-10-CM

## 2018-11-18 LAB
ABSOLUTE EOS #: 0.4 K/UL (ref 0–0.4)
ABSOLUTE IMMATURE GRANULOCYTE: ABNORMAL K/UL (ref 0–0.3)
ABSOLUTE LYMPH #: 1.8 K/UL (ref 1–4.8)
ABSOLUTE MONO #: 0.6 K/UL (ref 0.1–1.3)
ALBUMIN SERPL-MCNC: 3.8 G/DL (ref 3.5–5.2)
ALBUMIN/GLOBULIN RATIO: NORMAL (ref 1–2.5)
ALP BLD-CCNC: 70 U/L (ref 40–129)
ALT SERPL-CCNC: 9 U/L (ref 5–41)
ANION GAP SERPL CALCULATED.3IONS-SCNC: 10 MMOL/L (ref 9–17)
AST SERPL-CCNC: 12 U/L
BASOPHILS # BLD: 1 % (ref 0–2)
BASOPHILS ABSOLUTE: 0.1 K/UL (ref 0–0.2)
BILIRUB SERPL-MCNC: 0.33 MG/DL (ref 0.3–1.2)
BUN BLDV-MCNC: 9 MG/DL (ref 6–20)
BUN/CREAT BLD: NORMAL (ref 9–20)
CALCIUM SERPL-MCNC: 8.8 MG/DL (ref 8.6–10.4)
CHLORIDE BLD-SCNC: 99 MMOL/L (ref 98–107)
CO2: 29 MMOL/L (ref 20–31)
CREAT SERPL-MCNC: 0.83 MG/DL (ref 0.7–1.2)
DIFFERENTIAL TYPE: ABNORMAL
EKG ATRIAL RATE: 65 BPM
EKG P AXIS: 75 DEGREES
EKG P-R INTERVAL: 154 MS
EKG Q-T INTERVAL: 414 MS
EKG QRS DURATION: 90 MS
EKG QTC CALCULATION (BAZETT): 430 MS
EKG R AXIS: 38 DEGREES
EKG T AXIS: 39 DEGREES
EKG VENTRICULAR RATE: 65 BPM
EOSINOPHILS RELATIVE PERCENT: 6 % (ref 0–4)
GFR AFRICAN AMERICAN: >60 ML/MIN
GFR NON-AFRICAN AMERICAN: >60 ML/MIN
GFR SERPL CREATININE-BSD FRML MDRD: NORMAL ML/MIN/{1.73_M2}
GFR SERPL CREATININE-BSD FRML MDRD: NORMAL ML/MIN/{1.73_M2}
GLUCOSE BLD-MCNC: 94 MG/DL (ref 70–99)
HCT VFR BLD CALC: 41.3 % (ref 41–53)
HEMOGLOBIN: 13.8 G/DL (ref 13.5–17.5)
IMMATURE GRANULOCYTES: ABNORMAL %
LYMPHOCYTES # BLD: 27 % (ref 24–44)
MCH RBC QN AUTO: 30.7 PG (ref 26–34)
MCHC RBC AUTO-ENTMCNC: 33.4 G/DL (ref 31–37)
MCV RBC AUTO: 92.1 FL (ref 80–100)
MONOCYTES # BLD: 9 % (ref 1–7)
NRBC AUTOMATED: ABNORMAL PER 100 WBC
PDW BLD-RTO: 14.8 % (ref 11.5–14.9)
PLATELET # BLD: 171 K/UL (ref 150–450)
PLATELET ESTIMATE: ABNORMAL
PMV BLD AUTO: 9.4 FL (ref 6–12)
POTASSIUM SERPL-SCNC: 3.9 MMOL/L (ref 3.7–5.3)
RBC # BLD: 4.48 M/UL (ref 4.5–5.9)
RBC # BLD: ABNORMAL 10*6/UL
SEG NEUTROPHILS: 57 % (ref 36–66)
SEGMENTED NEUTROPHILS ABSOLUTE COUNT: 3.9 K/UL (ref 1.3–9.1)
SODIUM BLD-SCNC: 138 MMOL/L (ref 135–144)
TOTAL CK: 61 U/L (ref 39–308)
TOTAL PROTEIN: 6.5 G/DL (ref 6.4–8.3)
TROPONIN INTERP: NORMAL
TROPONIN T: <0.03 NG/ML
WBC # BLD: 6.8 K/UL (ref 3.5–11)
WBC # BLD: ABNORMAL 10*3/UL

## 2018-11-18 PROCEDURE — 85025 COMPLETE CBC W/AUTO DIFF WBC: CPT

## 2018-11-18 PROCEDURE — 6370000000 HC RX 637 (ALT 250 FOR IP): Performed by: INTERNAL MEDICINE

## 2018-11-18 PROCEDURE — 96374 THER/PROPH/DIAG INJ IV PUSH: CPT

## 2018-11-18 PROCEDURE — 84484 ASSAY OF TROPONIN QUANT: CPT

## 2018-11-18 PROCEDURE — 80053 COMPREHEN METABOLIC PANEL: CPT

## 2018-11-18 PROCEDURE — 2580000003 HC RX 258: Performed by: INTERNAL MEDICINE

## 2018-11-18 PROCEDURE — 36415 COLL VENOUS BLD VENIPUNCTURE: CPT

## 2018-11-18 PROCEDURE — G0378 HOSPITAL OBSERVATION PER HR: HCPCS

## 2018-11-18 PROCEDURE — 96376 TX/PRO/DX INJ SAME DRUG ADON: CPT

## 2018-11-18 PROCEDURE — 96375 TX/PRO/DX INJ NEW DRUG ADDON: CPT

## 2018-11-18 PROCEDURE — 6370000000 HC RX 637 (ALT 250 FOR IP): Performed by: EMERGENCY MEDICINE

## 2018-11-18 PROCEDURE — 6360000002 HC RX W HCPCS: Performed by: INTERNAL MEDICINE

## 2018-11-18 PROCEDURE — 99220 PR INITIAL OBSERVATION CARE/DAY 70 MINUTES: CPT | Performed by: INTERNAL MEDICINE

## 2018-11-18 PROCEDURE — 6360000002 HC RX W HCPCS: Performed by: EMERGENCY MEDICINE

## 2018-11-18 PROCEDURE — 82550 ASSAY OF CK (CPK): CPT

## 2018-11-18 PROCEDURE — 99285 EMERGENCY DEPT VISIT HI MDM: CPT

## 2018-11-18 PROCEDURE — 71046 X-RAY EXAM CHEST 2 VIEWS: CPT

## 2018-11-18 PROCEDURE — 96372 THER/PROPH/DIAG INJ SC/IM: CPT

## 2018-11-18 RX ORDER — SODIUM CHLORIDE 0.9 % (FLUSH) 0.9 %
10 SYRINGE (ML) INJECTION PRN
Status: DISCONTINUED | OUTPATIENT
Start: 2018-11-18 | End: 2018-11-19 | Stop reason: HOSPADM

## 2018-11-18 RX ORDER — MORPHINE SULFATE 4 MG/ML
4 INJECTION, SOLUTION INTRAMUSCULAR; INTRAVENOUS ONCE
Status: COMPLETED | OUTPATIENT
Start: 2018-11-18 | End: 2018-11-18

## 2018-11-18 RX ORDER — GABAPENTIN 800 MG/1
800 TABLET ORAL 4 TIMES DAILY
COMMUNITY
End: 2019-01-17 | Stop reason: SDUPTHER

## 2018-11-18 RX ORDER — GABAPENTIN 400 MG/1
800 CAPSULE ORAL ONCE
Status: COMPLETED | OUTPATIENT
Start: 2018-11-18 | End: 2018-11-18

## 2018-11-18 RX ORDER — SODIUM CHLORIDE 0.9 % (FLUSH) 0.9 %
10 SYRINGE (ML) INJECTION EVERY 12 HOURS SCHEDULED
Status: DISCONTINUED | OUTPATIENT
Start: 2018-11-18 | End: 2018-11-19 | Stop reason: HOSPADM

## 2018-11-18 RX ORDER — ASPIRIN 325 MG
325 TABLET ORAL ONCE
Status: COMPLETED | OUTPATIENT
Start: 2018-11-18 | End: 2018-11-18

## 2018-11-18 RX ORDER — GABAPENTIN 400 MG/1
800 CAPSULE ORAL 4 TIMES DAILY
Status: DISCONTINUED | OUTPATIENT
Start: 2018-11-19 | End: 2018-11-19 | Stop reason: HOSPADM

## 2018-11-18 RX ORDER — FENTANYL CITRATE 50 UG/ML
50 INJECTION, SOLUTION INTRAMUSCULAR; INTRAVENOUS EVERY 4 HOURS PRN
Status: DISCONTINUED | OUTPATIENT
Start: 2018-11-18 | End: 2018-11-19 | Stop reason: HOSPADM

## 2018-11-18 RX ORDER — GABAPENTIN 600 MG/1
600 TABLET ORAL
Status: DISCONTINUED | OUTPATIENT
Start: 2018-11-18 | End: 2018-11-18

## 2018-11-18 RX ADMIN — FENTANYL CITRATE 50 MCG: 50 INJECTION INTRAMUSCULAR; INTRAVENOUS at 19:16

## 2018-11-18 RX ADMIN — ASPIRIN 325 MG ORAL TABLET 325 MG: 325 PILL ORAL at 16:58

## 2018-11-18 RX ADMIN — GABAPENTIN 800 MG: 400 CAPSULE ORAL at 23:04

## 2018-11-18 RX ADMIN — FENTANYL CITRATE 50 MCG: 50 INJECTION INTRAMUSCULAR; INTRAVENOUS at 23:10

## 2018-11-18 RX ADMIN — ENOXAPARIN SODIUM 40 MG: 40 INJECTION SUBCUTANEOUS at 19:15

## 2018-11-18 RX ADMIN — Medication 10 ML: at 19:16

## 2018-11-18 RX ADMIN — MORPHINE SULFATE 4 MG: 4 INJECTION INTRAVENOUS at 16:19

## 2018-11-18 ASSESSMENT — PAIN SCALES - GENERAL
PAINLEVEL_OUTOF10: 7
PAINLEVEL_OUTOF10: 5
PAINLEVEL_OUTOF10: 8
PAINLEVEL_OUTOF10: 10
PAINLEVEL_OUTOF10: 8

## 2018-11-18 ASSESSMENT — ENCOUNTER SYMPTOMS
HEARTBURN: 0
COUGH: 1
NAUSEA: 0
ABDOMINAL PAIN: 0
BACK PAIN: 1
SHORTNESS OF BREATH: 0
VOMITING: 0

## 2018-11-18 NOTE — ED PROVIDER NOTES
Used    Alcohol use No      Comment: stopped drinking Set. 2014     PHYSICAL EXAM     INITIAL VITALS: BP (!) 174/104   Pulse 62   Temp 97.9 °F (36.6 °C)   Resp 16   Ht 5' 9\" (1.753 m)   Wt 150 lb (68 kg)   SpO2 98%   BMI 22.15 kg/m²    Physical Exam   Constitutional: He is oriented to person, place, and time. He appears well-developed and well-nourished. No distress. HENT:   Head: Normocephalic and atraumatic. Nose: Nose normal.   Eyes: Pupils are equal, round, and reactive to light. Conjunctivae and EOM are normal. Right eye exhibits no discharge. Left eye exhibits no discharge. Neck: Normal range of motion. Neck supple. No tracheal deviation present. Cardiovascular: Normal rate, regular rhythm, normal heart sounds and intact distal pulses. Radial pulses and femoral pulses 2+ BL   Pulmonary/Chest: Effort normal and breath sounds normal. No stridor. No respiratory distress. He has no wheezes. He has no rales. He exhibits no tenderness. Abdominal: Soft. Bowel sounds are normal. There is no tenderness. There is no rebound and no guarding. Musculoskeletal: Normal range of motion. He exhibits no edema or tenderness. Neurological: He is alert and oriented to person, place, and time. No cranial nerve deficit. Coordination normal.   Skin: Skin is warm and dry. No rash noted. He is not diaphoretic. No erythema. Psychiatric: He has a normal mood and affect.  His behavior is normal. Judgment normal.       MEDICAL DECISION MAKING:       ED Course as of Nov 18 1658   Sun Nov 18, 2018   1609 Negative cta chest 7 weeks ago, neg us abd this year showing no AAA  [WM]   26 I do not think he has PE or aortic dissection  [WM]   65 DW Dr Haze Lesch, accepts admit with consult to cardiology Dr. Mcfarlane Ankush  [WM]      ED Course User Index  [WM] Jo Jerez MD   Procedures    DIAGNOSTIC RESULTS   EKG:All EKG's are interpreted by the Emergency Department Physician who either signs or Co-signs this chart in the

## 2018-11-18 NOTE — ED NOTES

## 2018-11-19 ENCOUNTER — APPOINTMENT (OUTPATIENT)
Dept: NUCLEAR MEDICINE | Age: 58
End: 2018-11-19
Payer: MEDICARE

## 2018-11-19 VITALS
HEART RATE: 82 BPM | RESPIRATION RATE: 16 BRPM | OXYGEN SATURATION: 99 % | SYSTOLIC BLOOD PRESSURE: 138 MMHG | HEIGHT: 70 IN | DIASTOLIC BLOOD PRESSURE: 99 MMHG | TEMPERATURE: 97.8 F | WEIGHT: 141.09 LBS | BODY MASS INDEX: 20.2 KG/M2

## 2018-11-19 LAB
LV EF: 60 %
LVEF MODALITY: NORMAL
TROPONIN INTERP: NORMAL
TROPONIN T: <0.03 NG/ML

## 2018-11-19 PROCEDURE — 3430000000 HC RX DIAGNOSTIC RADIOPHARMACEUTICAL: Performed by: INTERNAL MEDICINE

## 2018-11-19 PROCEDURE — 2580000003 HC RX 258: Performed by: INTERNAL MEDICINE

## 2018-11-19 PROCEDURE — G0378 HOSPITAL OBSERVATION PER HR: HCPCS

## 2018-11-19 PROCEDURE — 96375 TX/PRO/DX INJ NEW DRUG ADDON: CPT

## 2018-11-19 PROCEDURE — 99217 PR OBSERVATION CARE DISCHARGE MANAGEMENT: CPT | Performed by: INTERNAL MEDICINE

## 2018-11-19 PROCEDURE — 6370000000 HC RX 637 (ALT 250 FOR IP): Performed by: INTERNAL MEDICINE

## 2018-11-19 PROCEDURE — A9500 TC99M SESTAMIBI: HCPCS | Performed by: INTERNAL MEDICINE

## 2018-11-19 PROCEDURE — 84484 ASSAY OF TROPONIN QUANT: CPT

## 2018-11-19 PROCEDURE — 36415 COLL VENOUS BLD VENIPUNCTURE: CPT

## 2018-11-19 PROCEDURE — 6360000002 HC RX W HCPCS: Performed by: INTERNAL MEDICINE

## 2018-11-19 PROCEDURE — 78452 HT MUSCLE IMAGE SPECT MULT: CPT

## 2018-11-19 PROCEDURE — 96372 THER/PROPH/DIAG INJ SC/IM: CPT

## 2018-11-19 PROCEDURE — 93017 CV STRESS TEST TRACING ONLY: CPT

## 2018-11-19 PROCEDURE — 96376 TX/PRO/DX INJ SAME DRUG ADON: CPT

## 2018-11-19 PROCEDURE — 93005 ELECTROCARDIOGRAM TRACING: CPT

## 2018-11-19 RX ORDER — METOPROLOL TARTRATE 5 MG/5ML
2.5 INJECTION INTRAVENOUS PRN
Status: DISCONTINUED | OUTPATIENT
Start: 2018-11-19 | End: 2018-11-19 | Stop reason: HOSPADM

## 2018-11-19 RX ORDER — SODIUM CHLORIDE 0.9 % (FLUSH) 0.9 %
10 SYRINGE (ML) INJECTION PRN
Status: DISCONTINUED | OUTPATIENT
Start: 2018-11-19 | End: 2018-11-19 | Stop reason: HOSPADM

## 2018-11-19 RX ORDER — 0.9 % SODIUM CHLORIDE 0.9 %
250 INTRAVENOUS SOLUTION INTRAVENOUS ONCE
Status: DISCONTINUED | OUTPATIENT
Start: 2018-11-19 | End: 2018-11-19 | Stop reason: HOSPADM

## 2018-11-19 RX ORDER — AMINOPHYLLINE DIHYDRATE 25 MG/ML
100 INJECTION, SOLUTION INTRAVENOUS
Status: DISCONTINUED | OUTPATIENT
Start: 2018-11-19 | End: 2018-11-19 | Stop reason: HOSPADM

## 2018-11-19 RX ORDER — NITROGLYCERIN 0.4 MG/1
0.4 TABLET SUBLINGUAL EVERY 5 MIN PRN
Status: DISCONTINUED | OUTPATIENT
Start: 2018-11-19 | End: 2018-11-19 | Stop reason: HOSPADM

## 2018-11-19 RX ADMIN — REGADENOSON 0.4 MG: 0.08 INJECTION, SOLUTION INTRAVENOUS at 09:17

## 2018-11-19 RX ADMIN — Medication 10 ML: at 07:51

## 2018-11-19 RX ADMIN — TETRAKIS(2-METHOXYISOBUTYLISOCYANIDE)COPPER(I) TETRAFLUOROBORATE 34.7 MILLICURIE: 1 INJECTION, POWDER, LYOPHILIZED, FOR SOLUTION INTRAVENOUS at 09:20

## 2018-11-19 RX ADMIN — FENTANYL CITRATE 50 MCG: 50 INJECTION INTRAMUSCULAR; INTRAVENOUS at 03:30

## 2018-11-19 RX ADMIN — FENTANYL CITRATE 50 MCG: 50 INJECTION INTRAMUSCULAR; INTRAVENOUS at 07:44

## 2018-11-19 RX ADMIN — Medication 10 ML: at 09:02

## 2018-11-19 RX ADMIN — FENTANYL CITRATE 50 MCG: 50 INJECTION INTRAMUSCULAR; INTRAVENOUS at 11:50

## 2018-11-19 RX ADMIN — Medication 10 ML: at 09:17

## 2018-11-19 RX ADMIN — ENOXAPARIN SODIUM 40 MG: 40 INJECTION SUBCUTANEOUS at 07:44

## 2018-11-19 RX ADMIN — TETRAKIS(2-METHOXYISOBUTYLISOCYANIDE)COPPER(I) TETRAFLUOROBORATE 10.2 MILLICURIE: 1 INJECTION, POWDER, LYOPHILIZED, FOR SOLUTION INTRAVENOUS at 07:41

## 2018-11-19 RX ADMIN — Medication 10 ML: at 11:51

## 2018-11-19 RX ADMIN — GABAPENTIN 800 MG: 400 CAPSULE ORAL at 07:44

## 2018-11-19 RX ADMIN — GABAPENTIN 800 MG: 400 CAPSULE ORAL at 14:02

## 2018-11-19 RX ADMIN — Medication 10 ML: at 07:41

## 2018-11-19 ASSESSMENT — PAIN SCALES - GENERAL
PAINLEVEL_OUTOF10: 8
PAINLEVEL_OUTOF10: 10
PAINLEVEL_OUTOF10: 9
PAINLEVEL_OUTOF10: 10
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
## 2018-11-21 NOTE — DISCHARGE SUMMARY
signed by   Stefanie Rand MD  11/20/2018  8:54 PM      Thank you TAYLOR Kelley - EDY for the opportunity to be involved in this patient's care.

## 2019-01-17 ENCOUNTER — OFFICE VISIT (OUTPATIENT)
Dept: FAMILY MEDICINE CLINIC | Age: 59
End: 2019-01-17
Payer: MEDICARE

## 2019-01-17 VITALS
OXYGEN SATURATION: 99 % | HEIGHT: 69 IN | BODY MASS INDEX: 21.77 KG/M2 | WEIGHT: 147 LBS | HEART RATE: 65 BPM | SYSTOLIC BLOOD PRESSURE: 136 MMHG | DIASTOLIC BLOOD PRESSURE: 86 MMHG

## 2019-01-17 DIAGNOSIS — R20.2 NUMBNESS AND TINGLING: ICD-10-CM

## 2019-01-17 DIAGNOSIS — R07.89 ATYPICAL CHEST PAIN: ICD-10-CM

## 2019-01-17 DIAGNOSIS — M89.9 BONE DISORDER: ICD-10-CM

## 2019-01-17 DIAGNOSIS — M62.81 MUSCLE WEAKNESS (GENERALIZED): ICD-10-CM

## 2019-01-17 DIAGNOSIS — J20.9 ACUTE BRONCHITIS DUE TO INFECTION: ICD-10-CM

## 2019-01-17 DIAGNOSIS — F51.04 PSYCHOPHYSIOLOGICAL INSOMNIA: ICD-10-CM

## 2019-01-17 DIAGNOSIS — F33.1 MODERATE EPISODE OF RECURRENT MAJOR DEPRESSIVE DISORDER (HCC): ICD-10-CM

## 2019-01-17 DIAGNOSIS — Z86.79 S/P THORACIC AORTIC ANEURYSM REPAIR: ICD-10-CM

## 2019-01-17 DIAGNOSIS — M50.30 DDD (DEGENERATIVE DISC DISEASE), CERVICAL: ICD-10-CM

## 2019-01-17 DIAGNOSIS — M79.7 FIBROMYALGIA: Primary | ICD-10-CM

## 2019-01-17 DIAGNOSIS — I10 ESSENTIAL HYPERTENSION: ICD-10-CM

## 2019-01-17 DIAGNOSIS — R20.0 NUMBNESS AND TINGLING: ICD-10-CM

## 2019-01-17 DIAGNOSIS — Z98.890 S/P THORACIC AORTIC ANEURYSM REPAIR: ICD-10-CM

## 2019-01-17 PROCEDURE — 99215 OFFICE O/P EST HI 40 MIN: CPT | Performed by: FAMILY MEDICINE

## 2019-01-17 PROCEDURE — G8427 DOCREV CUR MEDS BY ELIG CLIN: HCPCS | Performed by: FAMILY MEDICINE

## 2019-01-17 PROCEDURE — G8420 CALC BMI NORM PARAMETERS: HCPCS | Performed by: FAMILY MEDICINE

## 2019-01-17 PROCEDURE — G0444 DEPRESSION SCREEN ANNUAL: HCPCS | Performed by: FAMILY MEDICINE

## 2019-01-17 PROCEDURE — 3017F COLORECTAL CA SCREEN DOC REV: CPT | Performed by: FAMILY MEDICINE

## 2019-01-17 PROCEDURE — G8484 FLU IMMUNIZE NO ADMIN: HCPCS | Performed by: FAMILY MEDICINE

## 2019-01-17 PROCEDURE — 1036F TOBACCO NON-USER: CPT | Performed by: FAMILY MEDICINE

## 2019-01-17 RX ORDER — DULOXETIN HYDROCHLORIDE 20 MG/1
20 CAPSULE, DELAYED RELEASE ORAL EVERY MORNING
Qty: 30 CAPSULE | Refills: 0 | Status: SHIPPED | OUTPATIENT
Start: 2019-01-17 | End: 2019-02-15 | Stop reason: SINTOL

## 2019-01-17 RX ORDER — GABAPENTIN 800 MG/1
800 TABLET ORAL 4 TIMES DAILY
Qty: 120 TABLET | Refills: 0 | Status: SHIPPED | OUTPATIENT
Start: 2019-01-17 | End: 2019-03-07 | Stop reason: SDUPTHER

## 2019-01-17 RX ORDER — LIDOCAINE 40 MG/G
CREAM TOPICAL
Qty: 120 G | Refills: 3 | Status: SHIPPED | OUTPATIENT
Start: 2019-01-17 | End: 2019-03-07

## 2019-01-17 RX ORDER — AZITHROMYCIN 250 MG/1
TABLET, FILM COATED ORAL
Qty: 6 TABLET | Refills: 0 | Status: SHIPPED | OUTPATIENT
Start: 2019-01-17 | End: 2019-01-22

## 2019-01-17 RX ORDER — NORTRIPTYLINE HYDROCHLORIDE 10 MG/1
10 CAPSULE ORAL NIGHTLY
Qty: 30 CAPSULE | Refills: 3 | Status: SHIPPED | OUTPATIENT
Start: 2019-01-17 | End: 2019-02-15 | Stop reason: SINTOL

## 2019-01-17 RX ORDER — AMLODIPINE BESYLATE 5 MG/1
TABLET ORAL
Qty: 90 TABLET | Refills: 1 | Status: SHIPPED | OUTPATIENT
Start: 2019-01-17 | End: 2019-12-02 | Stop reason: SDUPTHER

## 2019-01-17 RX ORDER — BLOOD PRESSURE TEST KIT
KIT MISCELLANEOUS
Qty: 1 KIT | Refills: 0 | Status: SHIPPED | OUTPATIENT
Start: 2019-01-17

## 2019-01-17 ASSESSMENT — PATIENT HEALTH QUESTIONNAIRE - PHQ9
1. LITTLE INTEREST OR PLEASURE IN DOING THINGS: 2
5. POOR APPETITE OR OVEREATING: 3
4. FEELING TIRED OR HAVING LITTLE ENERGY: 2
SUM OF ALL RESPONSES TO PHQ QUESTIONS 1-9: 14
2. FEELING DOWN, DEPRESSED OR HOPELESS: 1
7. TROUBLE CONCENTRATING ON THINGS, SUCH AS READING THE NEWSPAPER OR WATCHING TELEVISION: 1
8. MOVING OR SPEAKING SO SLOWLY THAT OTHER PEOPLE COULD HAVE NOTICED. OR THE OPPOSITE, BEING SO FIGETY OR RESTLESS THAT YOU HAVE BEEN MOVING AROUND A LOT MORE THAN USUAL: 1
SUM OF ALL RESPONSES TO PHQ QUESTIONS 1-9: 14
9. THOUGHTS THAT YOU WOULD BE BETTER OFF DEAD, OR OF HURTING YOURSELF: 0
3. TROUBLE FALLING OR STAYING ASLEEP: 3
10. IF YOU CHECKED OFF ANY PROBLEMS, HOW DIFFICULT HAVE THESE PROBLEMS MADE IT FOR YOU TO DO YOUR WORK, TAKE CARE OF THINGS AT HOME, OR GET ALONG WITH OTHER PEOPLE: 1
6. FEELING BAD ABOUT YOURSELF - OR THAT YOU ARE A FAILURE OR HAVE LET YOURSELF OR YOUR FAMILY DOWN: 1
SUM OF ALL RESPONSES TO PHQ9 QUESTIONS 1 & 2: 3

## 2019-01-17 ASSESSMENT — ENCOUNTER SYMPTOMS
WHEEZING: 0
ABDOMINAL DISTENTION: 0
DIARRHEA: 0
BACK PAIN: 1
CONSTIPATION: 0
NAUSEA: 0
SHORTNESS OF BREATH: 1
COUGH: 1
ABDOMINAL PAIN: 0
VOMITING: 0
CHEST TIGHTNESS: 0

## 2019-01-20 ENCOUNTER — TELEPHONE (OUTPATIENT)
Dept: FAMILY MEDICINE CLINIC | Age: 59
End: 2019-01-20

## 2019-01-20 PROBLEM — F33.1 MODERATE EPISODE OF RECURRENT MAJOR DEPRESSIVE DISORDER (HCC): Status: ACTIVE | Noted: 2019-01-20

## 2019-01-20 ASSESSMENT — ENCOUNTER SYMPTOMS
SINUS PAIN: 0
SINUS PRESSURE: 0

## 2019-01-24 ENCOUNTER — HOSPITAL ENCOUNTER (EMERGENCY)
Age: 59
Discharge: HOME | End: 2019-01-24
Attending: EMERGENCY MEDICINE
Payer: MEDICARE

## 2019-01-24 ENCOUNTER — APPOINTMENT (OUTPATIENT)
Dept: GENERAL RADIOLOGY | Age: 59
End: 2019-01-24
Payer: MEDICARE

## 2019-01-24 ENCOUNTER — APPOINTMENT (OUTPATIENT)
Dept: CT IMAGING | Age: 59
End: 2019-01-24
Payer: MEDICARE

## 2019-01-24 VITALS
TEMPERATURE: 97.3 F | OXYGEN SATURATION: 93 % | HEART RATE: 66 BPM | BODY MASS INDEX: 22.07 KG/M2 | SYSTOLIC BLOOD PRESSURE: 156 MMHG | HEIGHT: 69 IN | WEIGHT: 149 LBS | RESPIRATION RATE: 16 BRPM | DIASTOLIC BLOOD PRESSURE: 100 MMHG

## 2019-01-24 DIAGNOSIS — M94.0 COSTOCHONDRITIS: Primary | ICD-10-CM

## 2019-01-24 PROBLEM — Z87.81 HISTORY OF RIB FRACTURE: Status: ACTIVE | Noted: 2019-01-24

## 2019-01-24 LAB
ABSOLUTE EOS #: 0.3 K/UL (ref 0–0.4)
ABSOLUTE IMMATURE GRANULOCYTE: ABNORMAL K/UL (ref 0–0.3)
ABSOLUTE LYMPH #: 2 K/UL (ref 1–4.8)
ABSOLUTE MONO #: 0.6 K/UL (ref 0.1–1.3)
ANION GAP SERPL CALCULATED.3IONS-SCNC: 10 MMOL/L (ref 9–17)
BASOPHILS # BLD: 1 % (ref 0–2)
BASOPHILS ABSOLUTE: 0.1 K/UL (ref 0–0.2)
BNP INTERPRETATION: NORMAL
BUN BLDV-MCNC: 12 MG/DL (ref 6–20)
BUN/CREAT BLD: ABNORMAL (ref 9–20)
CALCIUM SERPL-MCNC: 9.2 MG/DL (ref 8.6–10.4)
CHLORIDE BLD-SCNC: 101 MMOL/L (ref 98–107)
CO2: 30 MMOL/L (ref 20–31)
CREAT SERPL-MCNC: 0.97 MG/DL (ref 0.7–1.2)
D-DIMER QUANTITATIVE: 0.62 MG/L FEU (ref 0–0.59)
DIFFERENTIAL TYPE: ABNORMAL
EKG ATRIAL RATE: 78 BPM
EKG P AXIS: 83 DEGREES
EKG P-R INTERVAL: 156 MS
EKG Q-T INTERVAL: 376 MS
EKG QRS DURATION: 92 MS
EKG QTC CALCULATION (BAZETT): 428 MS
EKG R AXIS: 46 DEGREES
EKG T AXIS: 32 DEGREES
EKG VENTRICULAR RATE: 78 BPM
EOSINOPHILS RELATIVE PERCENT: 4 % (ref 0–4)
GFR AFRICAN AMERICAN: >60 ML/MIN
GFR NON-AFRICAN AMERICAN: >60 ML/MIN
GFR SERPL CREATININE-BSD FRML MDRD: ABNORMAL ML/MIN/{1.73_M2}
GFR SERPL CREATININE-BSD FRML MDRD: ABNORMAL ML/MIN/{1.73_M2}
GLUCOSE BLD-MCNC: 86 MG/DL (ref 70–99)
HCT VFR BLD CALC: 44.6 % (ref 41–53)
HEMOGLOBIN: 14.8 G/DL (ref 13.5–17.5)
IMMATURE GRANULOCYTES: ABNORMAL %
LYMPHOCYTES # BLD: 26 % (ref 24–44)
MCH RBC QN AUTO: 31.2 PG (ref 26–34)
MCHC RBC AUTO-ENTMCNC: 33.3 G/DL (ref 31–37)
MCV RBC AUTO: 93.7 FL (ref 80–100)
MONOCYTES # BLD: 8 % (ref 1–7)
NRBC AUTOMATED: ABNORMAL PER 100 WBC
PDW BLD-RTO: 14.6 % (ref 11.5–14.9)
PLATELET # BLD: 210 K/UL (ref 150–450)
PLATELET ESTIMATE: ABNORMAL
PMV BLD AUTO: 9.6 FL (ref 6–12)
POTASSIUM SERPL-SCNC: 3.6 MMOL/L (ref 3.7–5.3)
PRO-BNP: 226 PG/ML
RBC # BLD: 4.76 M/UL (ref 4.5–5.9)
RBC # BLD: ABNORMAL 10*6/UL
SEG NEUTROPHILS: 61 % (ref 36–66)
SEGMENTED NEUTROPHILS ABSOLUTE COUNT: 4.7 K/UL (ref 1.3–9.1)
SODIUM BLD-SCNC: 141 MMOL/L (ref 135–144)
WBC # BLD: 7.7 K/UL (ref 3.5–11)
WBC # BLD: ABNORMAL 10*3/UL

## 2019-01-24 PROCEDURE — 93005 ELECTROCARDIOGRAM TRACING: CPT

## 2019-01-24 PROCEDURE — 99285 EMERGENCY DEPT VISIT HI MDM: CPT

## 2019-01-24 PROCEDURE — 6370000000 HC RX 637 (ALT 250 FOR IP): Performed by: EMERGENCY MEDICINE

## 2019-01-24 PROCEDURE — 85379 FIBRIN DEGRADATION QUANT: CPT

## 2019-01-24 PROCEDURE — 83880 ASSAY OF NATRIURETIC PEPTIDE: CPT

## 2019-01-24 PROCEDURE — 71046 X-RAY EXAM CHEST 2 VIEWS: CPT

## 2019-01-24 PROCEDURE — 2580000003 HC RX 258: Performed by: EMERGENCY MEDICINE

## 2019-01-24 PROCEDURE — 6360000004 HC RX CONTRAST MEDICATION: Performed by: EMERGENCY MEDICINE

## 2019-01-24 PROCEDURE — 96375 TX/PRO/DX INJ NEW DRUG ADDON: CPT

## 2019-01-24 PROCEDURE — 71260 CT THORAX DX C+: CPT

## 2019-01-24 PROCEDURE — 85025 COMPLETE CBC W/AUTO DIFF WBC: CPT

## 2019-01-24 PROCEDURE — 6360000002 HC RX W HCPCS: Performed by: EMERGENCY MEDICINE

## 2019-01-24 PROCEDURE — 36415 COLL VENOUS BLD VENIPUNCTURE: CPT

## 2019-01-24 PROCEDURE — 96374 THER/PROPH/DIAG INJ IV PUSH: CPT

## 2019-01-24 PROCEDURE — 80048 BASIC METABOLIC PNL TOTAL CA: CPT

## 2019-01-24 RX ORDER — IBUPROFEN 800 MG/1
800 TABLET ORAL EVERY 8 HOURS PRN
Qty: 20 TABLET | Refills: 0 | Status: SHIPPED | OUTPATIENT
Start: 2019-01-24 | End: 2019-02-21

## 2019-01-24 RX ORDER — KETOROLAC TROMETHAMINE 30 MG/ML
15 INJECTION, SOLUTION INTRAMUSCULAR; INTRAVENOUS ONCE
Status: COMPLETED | OUTPATIENT
Start: 2019-01-24 | End: 2019-01-24

## 2019-01-24 RX ORDER — ASPIRIN 81 MG/1
324 TABLET, CHEWABLE ORAL ONCE
Status: COMPLETED | OUTPATIENT
Start: 2019-01-24 | End: 2019-01-24

## 2019-01-24 RX ORDER — ACETAMINOPHEN 500 MG
1000 TABLET ORAL EVERY 8 HOURS PRN
Qty: 30 TABLET | Refills: 0 | Status: SHIPPED | OUTPATIENT
Start: 2019-01-24 | End: 2019-02-21

## 2019-01-24 RX ORDER — ACETAMINOPHEN 500 MG
1000 TABLET ORAL ONCE
Status: COMPLETED | OUTPATIENT
Start: 2019-01-24 | End: 2019-01-24

## 2019-01-24 RX ORDER — 0.9 % SODIUM CHLORIDE 0.9 %
80 INTRAVENOUS SOLUTION INTRAVENOUS ONCE
Status: COMPLETED | OUTPATIENT
Start: 2019-01-24 | End: 2019-01-24

## 2019-01-24 RX ORDER — MORPHINE SULFATE 4 MG/ML
4 INJECTION, SOLUTION INTRAMUSCULAR; INTRAVENOUS ONCE
Status: COMPLETED | OUTPATIENT
Start: 2019-01-24 | End: 2019-01-24

## 2019-01-24 RX ORDER — SODIUM CHLORIDE 0.9 % (FLUSH) 0.9 %
10 SYRINGE (ML) INJECTION PRN
Status: DISCONTINUED | OUTPATIENT
Start: 2019-01-24 | End: 2019-01-24 | Stop reason: HOSPADM

## 2019-01-24 RX ADMIN — MORPHINE SULFATE 4 MG: 4 INJECTION, SOLUTION INTRAMUSCULAR; INTRAVENOUS at 20:56

## 2019-01-24 RX ADMIN — IOVERSOL 75 ML: 741 INJECTION INTRA-ARTERIAL; INTRAVENOUS at 20:32

## 2019-01-24 RX ADMIN — KETOROLAC TROMETHAMINE 15 MG: 30 INJECTION, SOLUTION INTRAMUSCULAR; INTRAVENOUS at 20:56

## 2019-01-24 RX ADMIN — Medication 10 ML: at 20:32

## 2019-01-24 RX ADMIN — ASPIRIN 81 MG 324 MG: 81 TABLET ORAL at 18:55

## 2019-01-24 RX ADMIN — SODIUM CHLORIDE 80 ML: 9 INJECTION, SOLUTION INTRAVENOUS at 20:32

## 2019-01-24 RX ADMIN — ACETAMINOPHEN 1000 MG: 500 TABLET, FILM COATED ORAL at 18:55

## 2019-01-24 ASSESSMENT — PAIN DESCRIPTION - ORIENTATION: ORIENTATION: LEFT

## 2019-01-24 ASSESSMENT — PAIN DESCRIPTION - PAIN TYPE: TYPE: ACUTE PAIN

## 2019-01-24 ASSESSMENT — ENCOUNTER SYMPTOMS
SHORTNESS OF BREATH: 1
ABDOMINAL PAIN: 0
SORE THROAT: 0
NAUSEA: 0
COUGH: 0
VOMITING: 0

## 2019-01-24 ASSESSMENT — PAIN SCALES - GENERAL
PAINLEVEL_OUTOF10: 9
PAINLEVEL_OUTOF10: 9

## 2019-01-24 ASSESSMENT — PAIN DESCRIPTION - DESCRIPTORS: DESCRIPTORS: STABBING

## 2019-01-28 ENCOUNTER — TELEPHONE (OUTPATIENT)
Dept: FAMILY MEDICINE CLINIC | Age: 59
End: 2019-01-28

## 2019-01-28 DIAGNOSIS — M79.7 FIBROMYALGIA: ICD-10-CM

## 2019-01-28 DIAGNOSIS — M50.30 DDD (DEGENERATIVE DISC DISEASE), CERVICAL: ICD-10-CM

## 2019-01-28 DIAGNOSIS — M54.50 CHRONIC BILATERAL LOW BACK PAIN WITHOUT SCIATICA: Primary | ICD-10-CM

## 2019-01-28 DIAGNOSIS — Z87.81 HISTORY OF RIB FRACTURE: ICD-10-CM

## 2019-01-28 DIAGNOSIS — G89.29 CHRONIC BILATERAL LOW BACK PAIN WITHOUT SCIATICA: Primary | ICD-10-CM

## 2019-02-15 ENCOUNTER — OFFICE VISIT (OUTPATIENT)
Dept: FAMILY MEDICINE CLINIC | Age: 59
End: 2019-02-15
Payer: MEDICARE

## 2019-02-15 VITALS
SYSTOLIC BLOOD PRESSURE: 138 MMHG | DIASTOLIC BLOOD PRESSURE: 88 MMHG | WEIGHT: 151 LBS | OXYGEN SATURATION: 97 % | BODY MASS INDEX: 22.36 KG/M2 | HEIGHT: 69 IN | HEART RATE: 59 BPM

## 2019-02-15 DIAGNOSIS — M25.552 HIP PAIN, BILATERAL: ICD-10-CM

## 2019-02-15 DIAGNOSIS — Z23 NEED FOR PROPHYLACTIC VACCINATION AGAINST DIPHTHERIA-TETANUS-PERTUSSIS (DTP): ICD-10-CM

## 2019-02-15 DIAGNOSIS — M25.551 HIP PAIN, BILATERAL: ICD-10-CM

## 2019-02-15 DIAGNOSIS — Z23 NEED FOR PROPHYLACTIC VACCINATION AND INOCULATION AGAINST VARICELLA: ICD-10-CM

## 2019-02-15 DIAGNOSIS — M54.42 CHRONIC BILATERAL LOW BACK PAIN WITH BILATERAL SCIATICA: ICD-10-CM

## 2019-02-15 DIAGNOSIS — Z98.890 S/P THORACIC AORTIC ANEURYSM REPAIR: ICD-10-CM

## 2019-02-15 DIAGNOSIS — M54.41 CHRONIC BILATERAL LOW BACK PAIN WITH BILATERAL SCIATICA: ICD-10-CM

## 2019-02-15 DIAGNOSIS — G89.29 CHRONIC BILATERAL LOW BACK PAIN WITH BILATERAL SCIATICA: ICD-10-CM

## 2019-02-15 DIAGNOSIS — M79.7 FIBROMYALGIA: Primary | ICD-10-CM

## 2019-02-15 DIAGNOSIS — I51.7 CARDIOMEGALY: ICD-10-CM

## 2019-02-15 DIAGNOSIS — I10 ESSENTIAL HYPERTENSION: ICD-10-CM

## 2019-02-15 DIAGNOSIS — Z86.79 S/P THORACIC AORTIC ANEURYSM REPAIR: ICD-10-CM

## 2019-02-15 PROCEDURE — 3017F COLORECTAL CA SCREEN DOC REV: CPT | Performed by: FAMILY MEDICINE

## 2019-02-15 PROCEDURE — G8484 FLU IMMUNIZE NO ADMIN: HCPCS | Performed by: FAMILY MEDICINE

## 2019-02-15 PROCEDURE — G8420 CALC BMI NORM PARAMETERS: HCPCS | Performed by: FAMILY MEDICINE

## 2019-02-15 PROCEDURE — 99214 OFFICE O/P EST MOD 30 MIN: CPT | Performed by: FAMILY MEDICINE

## 2019-02-15 PROCEDURE — G8427 DOCREV CUR MEDS BY ELIG CLIN: HCPCS | Performed by: FAMILY MEDICINE

## 2019-02-15 PROCEDURE — 1036F TOBACCO NON-USER: CPT | Performed by: FAMILY MEDICINE

## 2019-02-15 RX ORDER — VENLAFAXINE HYDROCHLORIDE 37.5 MG/1
37.5 CAPSULE, EXTENDED RELEASE ORAL EVERY MORNING
Qty: 30 CAPSULE | Refills: 0 | Status: SHIPPED | OUTPATIENT
Start: 2019-02-15 | End: 2019-02-21

## 2019-02-15 RX ORDER — TIZANIDINE 2 MG/1
2 TABLET ORAL EVERY 8 HOURS PRN
Qty: 90 TABLET | Refills: 0 | Status: SHIPPED | OUTPATIENT
Start: 2019-02-15 | End: 2019-03-07

## 2019-02-15 ASSESSMENT — ENCOUNTER SYMPTOMS
CHEST TIGHTNESS: 0
COUGH: 1
BACK PAIN: 1
ABDOMINAL PAIN: 0
VOMITING: 0
SINUS PAIN: 0
WHEEZING: 0
CONSTIPATION: 0
ABDOMINAL DISTENTION: 0
SHORTNESS OF BREATH: 0
SINUS PRESSURE: 0
NAUSEA: 0
DIARRHEA: 0

## 2019-02-17 PROBLEM — M25.551 HIP PAIN, BILATERAL: Status: ACTIVE | Noted: 2019-02-17

## 2019-02-17 PROBLEM — J20.9 ACUTE BRONCHITIS DUE TO INFECTION: Status: RESOLVED | Noted: 2019-01-17 | Resolved: 2019-02-17

## 2019-02-17 PROBLEM — I51.7 CARDIOMEGALY: Status: ACTIVE | Noted: 2019-02-17

## 2019-02-17 PROBLEM — M25.552 HIP PAIN, BILATERAL: Status: ACTIVE | Noted: 2019-02-17

## 2019-03-05 ENCOUNTER — APPOINTMENT (OUTPATIENT)
Dept: CT IMAGING | Age: 59
End: 2019-03-05
Payer: MEDICARE

## 2019-03-05 ENCOUNTER — HOSPITAL ENCOUNTER (EMERGENCY)
Age: 59
Discharge: HOME | End: 2019-03-05
Attending: EMERGENCY MEDICINE
Payer: MEDICARE

## 2019-03-05 VITALS
RESPIRATION RATE: 14 BRPM | WEIGHT: 140 LBS | DIASTOLIC BLOOD PRESSURE: 112 MMHG | OXYGEN SATURATION: 100 % | SYSTOLIC BLOOD PRESSURE: 154 MMHG | BODY MASS INDEX: 20.73 KG/M2 | HEART RATE: 72 BPM | TEMPERATURE: 97.9 F | HEIGHT: 69 IN

## 2019-03-05 DIAGNOSIS — R51.9 ACUTE NONINTRACTABLE HEADACHE, UNSPECIFIED HEADACHE TYPE: ICD-10-CM

## 2019-03-05 DIAGNOSIS — H60.502 ACUTE OTITIS EXTERNA OF LEFT EAR, UNSPECIFIED TYPE: Primary | ICD-10-CM

## 2019-03-05 PROCEDURE — 6360000002 HC RX W HCPCS: Performed by: PHYSICIAN ASSISTANT

## 2019-03-05 PROCEDURE — 96372 THER/PROPH/DIAG INJ SC/IM: CPT

## 2019-03-05 PROCEDURE — 70450 CT HEAD/BRAIN W/O DYE: CPT

## 2019-03-05 PROCEDURE — 99283 EMERGENCY DEPT VISIT LOW MDM: CPT

## 2019-03-05 RX ORDER — NALBUPHINE HCL 10 MG/ML
10 AMPUL (ML) INJECTION ONCE
Status: COMPLETED | OUTPATIENT
Start: 2019-03-05 | End: 2019-03-05

## 2019-03-05 RX ADMIN — NALBUPHINE HYDROCHLORIDE 10 MG: 10 INJECTION, SOLUTION INTRAMUSCULAR; INTRAVENOUS; SUBCUTANEOUS at 10:23

## 2019-03-05 ASSESSMENT — PAIN SCALES - GENERAL
PAINLEVEL_OUTOF10: 10
PAINLEVEL_OUTOF10: 10

## 2019-03-05 ASSESSMENT — PAIN DESCRIPTION - ORIENTATION: ORIENTATION: LEFT

## 2019-03-05 ASSESSMENT — PAIN DESCRIPTION - PAIN TYPE: TYPE: ACUTE PAIN

## 2019-03-05 ASSESSMENT — PAIN DESCRIPTION - LOCATION: LOCATION: EAR

## 2019-03-16 ENCOUNTER — HOSPITAL ENCOUNTER (OUTPATIENT)
Dept: MRI IMAGING | Age: 59
Discharge: HOME | End: 2019-03-18
Payer: MEDICARE

## 2019-03-16 ENCOUNTER — HOSPITAL ENCOUNTER (EMERGENCY)
Age: 59
Discharge: HOME | End: 2019-03-16
Attending: EMERGENCY MEDICINE
Payer: MEDICARE

## 2019-03-16 VITALS
DIASTOLIC BLOOD PRESSURE: 101 MMHG | OXYGEN SATURATION: 98 % | HEART RATE: 77 BPM | TEMPERATURE: 97.8 F | BODY MASS INDEX: 20.73 KG/M2 | RESPIRATION RATE: 16 BRPM | HEIGHT: 69 IN | SYSTOLIC BLOOD PRESSURE: 154 MMHG | WEIGHT: 140 LBS

## 2019-03-16 DIAGNOSIS — G44.52 NEW PERSISTENT DAILY HEADACHE: ICD-10-CM

## 2019-03-16 DIAGNOSIS — H53.9 VISION CHANGES: ICD-10-CM

## 2019-03-16 DIAGNOSIS — K04.7 DENTAL ABSCESS: Primary | ICD-10-CM

## 2019-03-16 PROCEDURE — 6370000000 HC RX 637 (ALT 250 FOR IP): Performed by: EMERGENCY MEDICINE

## 2019-03-16 PROCEDURE — 6360000002 HC RX W HCPCS: Performed by: EMERGENCY MEDICINE

## 2019-03-16 PROCEDURE — 96372 THER/PROPH/DIAG INJ SC/IM: CPT

## 2019-03-16 PROCEDURE — 99282 EMERGENCY DEPT VISIT SF MDM: CPT

## 2019-03-16 PROCEDURE — 70551 MRI BRAIN STEM W/O DYE: CPT

## 2019-03-16 RX ORDER — CLINDAMYCIN HYDROCHLORIDE 300 MG/1
300 CAPSULE ORAL 4 TIMES DAILY
Qty: 40 CAPSULE | Refills: 0 | Status: SHIPPED | OUTPATIENT
Start: 2019-03-16 | End: 2019-03-26

## 2019-03-16 RX ORDER — FENTANYL CITRATE 50 UG/ML
50 INJECTION, SOLUTION INTRAMUSCULAR; INTRAVENOUS ONCE
Status: COMPLETED | OUTPATIENT
Start: 2019-03-16 | End: 2019-03-16

## 2019-03-16 RX ORDER — CLINDAMYCIN HYDROCHLORIDE 150 MG/1
300 CAPSULE ORAL ONCE
Status: COMPLETED | OUTPATIENT
Start: 2019-03-16 | End: 2019-03-16

## 2019-03-16 RX ADMIN — FENTANYL CITRATE 50 MCG: 50 INJECTION INTRAMUSCULAR; INTRAVENOUS at 09:35

## 2019-03-16 RX ADMIN — CLINDAMYCIN HYDROCHLORIDE 300 MG: 150 CAPSULE ORAL at 09:35

## 2019-03-16 ASSESSMENT — ENCOUNTER SYMPTOMS
EYES NEGATIVE: 1
RESPIRATORY NEGATIVE: 1
GASTROINTESTINAL NEGATIVE: 1

## 2019-03-16 ASSESSMENT — PAIN SCALES - GENERAL
PAINLEVEL_OUTOF10: 9
PAINLEVEL_OUTOF10: 9

## 2019-03-22 ENCOUNTER — HOSPITAL ENCOUNTER (INPATIENT)
Age: 59
LOS: 1 days | Discharge: HOME | DRG: 103 | End: 2019-03-25
Attending: EMERGENCY MEDICINE | Admitting: EMERGENCY MEDICINE
Payer: MEDICARE

## 2019-03-22 DIAGNOSIS — R51.9 NONINTRACTABLE HEADACHE, UNSPECIFIED CHRONICITY PATTERN, UNSPECIFIED HEADACHE TYPE: Primary | ICD-10-CM

## 2019-03-22 PROCEDURE — 6370000000 HC RX 637 (ALT 250 FOR IP): Performed by: EMERGENCY MEDICINE

## 2019-03-22 PROCEDURE — 99285 EMERGENCY DEPT VISIT HI MDM: CPT

## 2019-03-22 RX ORDER — OXYCODONE HYDROCHLORIDE AND ACETAMINOPHEN 5; 325 MG/1; MG/1
1 TABLET ORAL ONCE
Status: COMPLETED | OUTPATIENT
Start: 2019-03-23 | End: 2019-03-22

## 2019-03-22 RX ADMIN — OXYCODONE HYDROCHLORIDE AND ACETAMINOPHEN 1 TABLET: 5; 325 TABLET ORAL at 23:56

## 2019-03-22 ASSESSMENT — PAIN DESCRIPTION - LOCATION: LOCATION: ABDOMEN

## 2019-03-22 ASSESSMENT — PAIN DESCRIPTION - PAIN TYPE: TYPE: ACUTE PAIN

## 2019-03-22 ASSESSMENT — PAIN DESCRIPTION - PROGRESSION: CLINICAL_PROGRESSION: NOT CHANGED

## 2019-03-22 ASSESSMENT — PAIN DESCRIPTION - FREQUENCY: FREQUENCY: INTERMITTENT

## 2019-03-22 ASSESSMENT — PAIN DESCRIPTION - ONSET: ONSET: ON-GOING

## 2019-03-22 ASSESSMENT — PAIN SCALES - GENERAL: PAINLEVEL_OUTOF10: 7

## 2019-03-22 ASSESSMENT — PAIN DESCRIPTION - DESCRIPTORS: DESCRIPTORS: CRAMPING

## 2019-03-23 ENCOUNTER — APPOINTMENT (OUTPATIENT)
Dept: MRI IMAGING | Age: 59
DRG: 103 | End: 2019-03-23
Payer: MEDICARE

## 2019-03-23 ENCOUNTER — APPOINTMENT (OUTPATIENT)
Dept: CT IMAGING | Age: 59
DRG: 103 | End: 2019-03-23
Payer: MEDICARE

## 2019-03-23 ENCOUNTER — APPOINTMENT (OUTPATIENT)
Dept: GENERAL RADIOLOGY | Age: 59
DRG: 103 | End: 2019-03-23
Payer: MEDICARE

## 2019-03-23 PROBLEM — M54.2 NECK PAIN: Status: ACTIVE | Noted: 2019-03-23

## 2019-03-23 PROBLEM — R51.9 HEADACHE: Status: ACTIVE | Noted: 2019-03-23

## 2019-03-23 PROBLEM — M26.609 TMJ (TEMPOROMANDIBULAR JOINT SYNDROME): Status: ACTIVE | Noted: 2019-03-23

## 2019-03-23 LAB
ABSOLUTE EOS #: 0.36 K/UL (ref 0–0.44)
ABSOLUTE IMMATURE GRANULOCYTE: 0.03 K/UL (ref 0–0.3)
ABSOLUTE LYMPH #: 1.54 K/UL (ref 1.1–3.7)
ABSOLUTE MONO #: 0.58 K/UL (ref 0.1–1.2)
ALBUMIN SERPL-MCNC: 3.7 G/DL (ref 3.5–5.2)
ALBUMIN/GLOBULIN RATIO: 1.3 (ref 1–2.5)
ALP BLD-CCNC: 72 U/L (ref 40–129)
ALT SERPL-CCNC: 8 U/L (ref 5–41)
ANION GAP SERPL CALCULATED.3IONS-SCNC: 9 MMOL/L (ref 9–17)
AST SERPL-CCNC: 16 U/L
BASOPHILS # BLD: 1 % (ref 0–2)
BASOPHILS ABSOLUTE: 0.1 K/UL (ref 0–0.2)
BILIRUB SERPL-MCNC: 0.22 MG/DL (ref 0.3–1.2)
BUN BLDV-MCNC: 13 MG/DL (ref 6–20)
BUN/CREAT BLD: ABNORMAL (ref 9–20)
CALCIUM SERPL-MCNC: 8.7 MG/DL (ref 8.6–10.4)
CHLORIDE BLD-SCNC: 100 MMOL/L (ref 98–107)
CO2: 28 MMOL/L (ref 20–31)
CREAT SERPL-MCNC: 0.79 MG/DL (ref 0.7–1.2)
DIFFERENTIAL TYPE: ABNORMAL
EOSINOPHILS RELATIVE PERCENT: 4 % (ref 1–4)
GFR AFRICAN AMERICAN: >60 ML/MIN
GFR NON-AFRICAN AMERICAN: >60 ML/MIN
GFR SERPL CREATININE-BSD FRML MDRD: ABNORMAL ML/MIN/{1.73_M2}
GFR SERPL CREATININE-BSD FRML MDRD: ABNORMAL ML/MIN/{1.73_M2}
GLUCOSE BLD-MCNC: 101 MG/DL (ref 70–99)
HCT VFR BLD CALC: 39.9 % (ref 40.7–50.3)
HEMOGLOBIN: 13.1 G/DL (ref 13–17)
IMMATURE GRANULOCYTES: 0 %
LYMPHOCYTES # BLD: 19 % (ref 24–43)
MCH RBC QN AUTO: 30.5 PG (ref 25.2–33.5)
MCHC RBC AUTO-ENTMCNC: 32.8 G/DL (ref 28.4–34.8)
MCV RBC AUTO: 93 FL (ref 82.6–102.9)
MONOCYTES # BLD: 7 % (ref 3–12)
NRBC AUTOMATED: 0 PER 100 WBC
PDW BLD-RTO: 13.5 % (ref 11.8–14.4)
PLATELET # BLD: 229 K/UL (ref 138–453)
PLATELET ESTIMATE: ABNORMAL
PMV BLD AUTO: 10.9 FL (ref 8.1–13.5)
POTASSIUM SERPL-SCNC: 3.9 MMOL/L (ref 3.7–5.3)
RBC # BLD: 4.29 M/UL (ref 4.21–5.77)
RBC # BLD: ABNORMAL 10*6/UL
SEDIMENTATION RATE, ERYTHROCYTE: 2 MM (ref 0–10)
SEG NEUTROPHILS: 69 % (ref 36–65)
SEGMENTED NEUTROPHILS ABSOLUTE COUNT: 5.65 K/UL (ref 1.5–8.1)
SODIUM BLD-SCNC: 137 MMOL/L (ref 135–144)
TOTAL PROTEIN: 6.6 G/DL (ref 6.4–8.3)
WBC # BLD: 8.3 K/UL (ref 3.5–11.3)
WBC # BLD: ABNORMAL 10*3/UL

## 2019-03-23 PROCEDURE — 36415 COLL VENOUS BLD VENIPUNCTURE: CPT

## 2019-03-23 PROCEDURE — 2580000003 HC RX 258: Performed by: STUDENT IN AN ORGANIZED HEALTH CARE EDUCATION/TRAINING PROGRAM

## 2019-03-23 PROCEDURE — 85651 RBC SED RATE NONAUTOMATED: CPT

## 2019-03-23 PROCEDURE — 70450 CT HEAD/BRAIN W/O DYE: CPT

## 2019-03-23 PROCEDURE — G0378 HOSPITAL OBSERVATION PER HR: HCPCS

## 2019-03-23 PROCEDURE — 96374 THER/PROPH/DIAG INJ IV PUSH: CPT

## 2019-03-23 PROCEDURE — 6360000002 HC RX W HCPCS

## 2019-03-23 PROCEDURE — A9579 GAD-BASE MR CONTRAST NOS,1ML: HCPCS | Performed by: FAMILY MEDICINE

## 2019-03-23 PROCEDURE — 96376 TX/PRO/DX INJ SAME DRUG ADON: CPT

## 2019-03-23 PROCEDURE — 6370000000 HC RX 637 (ALT 250 FOR IP): Performed by: STUDENT IN AN ORGANIZED HEALTH CARE EDUCATION/TRAINING PROGRAM

## 2019-03-23 PROCEDURE — 6360000002 HC RX W HCPCS: Performed by: FAMILY MEDICINE

## 2019-03-23 PROCEDURE — 96375 TX/PRO/DX INJ NEW DRUG ADDON: CPT

## 2019-03-23 PROCEDURE — 94760 N-INVAS EAR/PLS OXIMETRY 1: CPT

## 2019-03-23 PROCEDURE — 6360000004 HC RX CONTRAST MEDICATION: Performed by: FAMILY MEDICINE

## 2019-03-23 PROCEDURE — 73552 X-RAY EXAM OF FEMUR 2/>: CPT

## 2019-03-23 PROCEDURE — 85025 COMPLETE CBC W/AUTO DIFF WBC: CPT

## 2019-03-23 PROCEDURE — 80053 COMPREHEN METABOLIC PANEL: CPT

## 2019-03-23 PROCEDURE — 70496 CT ANGIOGRAPHY HEAD: CPT

## 2019-03-23 PROCEDURE — 6360000002 HC RX W HCPCS: Performed by: STUDENT IN AN ORGANIZED HEALTH CARE EDUCATION/TRAINING PROGRAM

## 2019-03-23 PROCEDURE — 96361 HYDRATE IV INFUSION ADD-ON: CPT

## 2019-03-23 PROCEDURE — 6370000000 HC RX 637 (ALT 250 FOR IP): Performed by: FAMILY MEDICINE

## 2019-03-23 PROCEDURE — 2580000003 HC RX 258: Performed by: FAMILY MEDICINE

## 2019-03-23 PROCEDURE — 71045 X-RAY EXAM CHEST 1 VIEW: CPT

## 2019-03-23 PROCEDURE — 72156 MRI NECK SPINE W/O & W/DYE: CPT

## 2019-03-23 PROCEDURE — 99222 1ST HOSP IP/OBS MODERATE 55: CPT | Performed by: PSYCHIATRY & NEUROLOGY

## 2019-03-23 PROCEDURE — 2700000000 HC OXYGEN THERAPY PER DAY

## 2019-03-23 PROCEDURE — 70498 CT ANGIOGRAPHY NECK: CPT

## 2019-03-23 PROCEDURE — 96366 THER/PROPH/DIAG IV INF ADDON: CPT

## 2019-03-23 PROCEDURE — 6360000004 HC RX CONTRAST MEDICATION: Performed by: STUDENT IN AN ORGANIZED HEALTH CARE EDUCATION/TRAINING PROGRAM

## 2019-03-23 PROCEDURE — 96365 THER/PROPH/DIAG IV INF INIT: CPT

## 2019-03-23 RX ORDER — ACETAMINOPHEN 325 MG/1
650 TABLET ORAL EVERY 4 HOURS PRN
Status: DISCONTINUED | OUTPATIENT
Start: 2019-03-23 | End: 2019-03-25 | Stop reason: HOSPADM

## 2019-03-23 RX ORDER — ONDANSETRON 2 MG/ML
4 INJECTION INTRAMUSCULAR; INTRAVENOUS ONCE
Status: COMPLETED | OUTPATIENT
Start: 2019-03-23 | End: 2019-03-23

## 2019-03-23 RX ORDER — 0.9 % SODIUM CHLORIDE 0.9 %
1000 INTRAVENOUS SOLUTION INTRAVENOUS ONCE
Status: COMPLETED | OUTPATIENT
Start: 2019-03-23 | End: 2019-03-23

## 2019-03-23 RX ORDER — METHYLPREDNISOLONE SODIUM SUCCINATE 125 MG/2ML
200 INJECTION, POWDER, LYOPHILIZED, FOR SOLUTION INTRAMUSCULAR; INTRAVENOUS EVERY 8 HOURS
Status: DISCONTINUED | OUTPATIENT
Start: 2019-03-23 | End: 2019-03-23

## 2019-03-23 RX ORDER — HYDROCODONE BITARTRATE AND ACETAMINOPHEN 5; 325 MG/1; MG/1
2 TABLET ORAL EVERY 4 HOURS PRN
Status: DISCONTINUED | OUTPATIENT
Start: 2019-03-23 | End: 2019-03-25 | Stop reason: HOSPADM

## 2019-03-23 RX ORDER — PROPRANOLOL HCL 60 MG
60 CAPSULE, EXTENDED RELEASE 24HR ORAL DAILY
Status: DISCONTINUED | OUTPATIENT
Start: 2019-03-23 | End: 2019-03-25 | Stop reason: HOSPADM

## 2019-03-23 RX ORDER — SODIUM CHLORIDE 0.9 % (FLUSH) 0.9 %
10 SYRINGE (ML) INJECTION 2 TIMES DAILY
Status: DISCONTINUED | OUTPATIENT
Start: 2019-03-23 | End: 2019-03-25 | Stop reason: HOSPADM

## 2019-03-23 RX ORDER — HYDROCODONE BITARTRATE AND ACETAMINOPHEN 5; 325 MG/1; MG/1
1 TABLET ORAL EVERY 4 HOURS PRN
Status: DISCONTINUED | OUTPATIENT
Start: 2019-03-23 | End: 2019-03-25 | Stop reason: HOSPADM

## 2019-03-23 RX ORDER — ONDANSETRON 2 MG/ML
INJECTION INTRAMUSCULAR; INTRAVENOUS
Status: COMPLETED
Start: 2019-03-23 | End: 2019-03-23

## 2019-03-23 RX ORDER — AMLODIPINE BESYLATE 10 MG/1
5 TABLET ORAL DAILY
Status: DISCONTINUED | OUTPATIENT
Start: 2019-03-23 | End: 2019-03-25 | Stop reason: HOSPADM

## 2019-03-23 RX ORDER — CLINDAMYCIN HYDROCHLORIDE 150 MG/1
300 CAPSULE ORAL 4 TIMES DAILY
Status: DISCONTINUED | OUTPATIENT
Start: 2019-03-23 | End: 2019-03-25 | Stop reason: HOSPADM

## 2019-03-23 RX ORDER — ASPIRIN 81 MG/1
81 TABLET, CHEWABLE ORAL DAILY
Status: DISCONTINUED | OUTPATIENT
Start: 2019-03-23 | End: 2019-03-25 | Stop reason: HOSPADM

## 2019-03-23 RX ORDER — SODIUM CHLORIDE 0.9 % (FLUSH) 0.9 %
10 SYRINGE (ML) INJECTION PRN
Status: DISCONTINUED | OUTPATIENT
Start: 2019-03-23 | End: 2019-03-25 | Stop reason: HOSPADM

## 2019-03-23 RX ORDER — GABAPENTIN 800 MG/1
800 TABLET ORAL 4 TIMES DAILY
Status: DISCONTINUED | OUTPATIENT
Start: 2019-03-23 | End: 2019-03-25 | Stop reason: HOSPADM

## 2019-03-23 RX ORDER — TOPIRAMATE 25 MG/1
25 TABLET ORAL NIGHTLY
Status: DISCONTINUED | OUTPATIENT
Start: 2019-03-23 | End: 2019-03-25 | Stop reason: HOSPADM

## 2019-03-23 RX ORDER — SODIUM CHLORIDE 0.9 % (FLUSH) 0.9 %
10 SYRINGE (ML) INJECTION EVERY 12 HOURS SCHEDULED
Status: DISCONTINUED | OUTPATIENT
Start: 2019-03-23 | End: 2019-03-25 | Stop reason: HOSPADM

## 2019-03-23 RX ORDER — OXYCODONE HYDROCHLORIDE AND ACETAMINOPHEN 5; 325 MG/1; MG/1
1 TABLET ORAL ONCE
Status: COMPLETED | OUTPATIENT
Start: 2019-03-23 | End: 2019-03-23

## 2019-03-23 RX ADMIN — AMLODIPINE BESYLATE 5 MG: 10 TABLET ORAL at 09:32

## 2019-03-23 RX ADMIN — GABAPENTIN 800 MG: 800 TABLET ORAL at 12:16

## 2019-03-23 RX ADMIN — CLINDAMYCIN HYDROCHLORIDE 300 MG: 150 CAPSULE ORAL at 14:53

## 2019-03-23 RX ADMIN — CLINDAMYCIN HYDROCHLORIDE 300 MG: 150 CAPSULE ORAL at 09:32

## 2019-03-23 RX ADMIN — HYDROCODONE BITARTRATE AND ACETAMINOPHEN 2 TABLET: 5; 325 TABLET ORAL at 07:12

## 2019-03-23 RX ADMIN — CLINDAMYCIN HYDROCHLORIDE 300 MG: 150 CAPSULE ORAL at 22:47

## 2019-03-23 RX ADMIN — GABAPENTIN 800 MG: 800 TABLET ORAL at 09:32

## 2019-03-23 RX ADMIN — SODIUM CHLORIDE 200 MG: 9 INJECTION, SOLUTION INTRAVENOUS at 22:47

## 2019-03-23 RX ADMIN — OXYCODONE HYDROCHLORIDE AND ACETAMINOPHEN 1 TABLET: 5; 325 TABLET ORAL at 03:00

## 2019-03-23 RX ADMIN — ASPIRIN 81 MG 81 MG: 81 TABLET ORAL at 09:32

## 2019-03-23 RX ADMIN — ONDANSETRON 4 MG: 2 INJECTION INTRAMUSCULAR; INTRAVENOUS at 07:29

## 2019-03-23 RX ADMIN — HYDROCODONE BITARTRATE AND ACETAMINOPHEN 2 TABLET: 5; 325 TABLET ORAL at 18:34

## 2019-03-23 RX ADMIN — ACETAMINOPHEN 650 MG: 325 TABLET ORAL at 14:55

## 2019-03-23 RX ADMIN — GABAPENTIN 800 MG: 800 TABLET ORAL at 22:47

## 2019-03-23 RX ADMIN — GADOTERIDOL 13 ML: 279.3 INJECTION, SOLUTION INTRAVENOUS at 18:20

## 2019-03-23 RX ADMIN — IOVERSOL 90 ML: 741 INJECTION INTRA-ARTERIAL; INTRAVENOUS at 02:16

## 2019-03-23 RX ADMIN — SODIUM CHLORIDE 1000 ML: 9 INJECTION, SOLUTION INTRAVENOUS at 00:42

## 2019-03-23 RX ADMIN — ONDANSETRON 4 MG: 2 INJECTION INTRAMUSCULAR; INTRAVENOUS at 00:42

## 2019-03-23 RX ADMIN — HYDROCODONE BITARTRATE AND ACETAMINOPHEN 2 TABLET: 5; 325 TABLET ORAL at 12:16

## 2019-03-23 RX ADMIN — TOPIRAMATE 25 MG: 25 TABLET, FILM COATED ORAL at 22:47

## 2019-03-23 RX ADMIN — PROPRANOLOL HYDROCHLORIDE 60 MG: 60 CAPSULE, EXTENDED RELEASE ORAL at 09:33

## 2019-03-23 RX ADMIN — Medication 10 ML: at 22:49

## 2019-03-23 RX ADMIN — SODIUM CHLORIDE 200 MG: 9 INJECTION, SOLUTION INTRAVENOUS at 14:53

## 2019-03-23 RX ADMIN — HYDROCODONE BITARTRATE AND ACETAMINOPHEN 2 TABLET: 5; 325 TABLET ORAL at 22:47

## 2019-03-23 RX ADMIN — CLINDAMYCIN HYDROCHLORIDE 300 MG: 150 CAPSULE ORAL at 18:36

## 2019-03-23 RX ADMIN — GABAPENTIN 800 MG: 800 TABLET ORAL at 18:36

## 2019-03-23 ASSESSMENT — ENCOUNTER SYMPTOMS
SHORTNESS OF BREATH: 0
SORE THROAT: 0
CHEST TIGHTNESS: 0
ABDOMINAL PAIN: 0
WHEEZING: 0
NAUSEA: 0
VOMITING: 0
TROUBLE SWALLOWING: 0
BACK PAIN: 0
COUGH: 0
PHOTOPHOBIA: 0

## 2019-03-23 ASSESSMENT — PAIN - FUNCTIONAL ASSESSMENT: PAIN_FUNCTIONAL_ASSESSMENT: ACTIVITIES ARE NOT PREVENTED

## 2019-03-23 ASSESSMENT — PAIN DESCRIPTION - PROGRESSION: CLINICAL_PROGRESSION: NOT CHANGED

## 2019-03-23 ASSESSMENT — PAIN DESCRIPTION - PAIN TYPE
TYPE: ACUTE PAIN
TYPE: ACUTE PAIN
TYPE: CHRONIC PAIN

## 2019-03-23 ASSESSMENT — PAIN SCALES - GENERAL
PAINLEVEL_OUTOF10: 7
PAINLEVEL_OUTOF10: 6
PAINLEVEL_OUTOF10: 8
PAINLEVEL_OUTOF10: 3
PAINLEVEL_OUTOF10: 6

## 2019-03-23 ASSESSMENT — PAIN DESCRIPTION - FREQUENCY
FREQUENCY: CONTINUOUS
FREQUENCY: CONTINUOUS

## 2019-03-23 ASSESSMENT — PAIN DESCRIPTION - LOCATION
LOCATION: HEAD;NECK
LOCATION: HEAD
LOCATION: HEAD

## 2019-03-23 ASSESSMENT — PAIN DESCRIPTION - ORIENTATION
ORIENTATION: POSTERIOR
ORIENTATION: LEFT;POSTERIOR

## 2019-03-23 ASSESSMENT — PAIN DESCRIPTION - DESCRIPTORS
DESCRIPTORS: ACHING
DESCRIPTORS: ACHING

## 2019-03-23 ASSESSMENT — PAIN DESCRIPTION - ONSET
ONSET: ON-GOING
ONSET: GRADUAL

## 2019-03-24 PROBLEM — R51.9 HEADACHE: Status: ACTIVE | Noted: 2019-03-24

## 2019-03-24 LAB — TOTAL CK: 50 U/L (ref 39–308)

## 2019-03-24 PROCEDURE — 96376 TX/PRO/DX INJ SAME DRUG ADON: CPT

## 2019-03-24 PROCEDURE — 96366 THER/PROPH/DIAG IV INF ADDON: CPT

## 2019-03-24 PROCEDURE — 6370000000 HC RX 637 (ALT 250 FOR IP): Performed by: STUDENT IN AN ORGANIZED HEALTH CARE EDUCATION/TRAINING PROGRAM

## 2019-03-24 PROCEDURE — G0378 HOSPITAL OBSERVATION PER HR: HCPCS

## 2019-03-24 PROCEDURE — 1200000000 HC SEMI PRIVATE

## 2019-03-24 PROCEDURE — 6370000000 HC RX 637 (ALT 250 FOR IP): Performed by: FAMILY MEDICINE

## 2019-03-24 PROCEDURE — 2580000003 HC RX 258: Performed by: STUDENT IN AN ORGANIZED HEALTH CARE EDUCATION/TRAINING PROGRAM

## 2019-03-24 PROCEDURE — 2580000003 HC RX 258: Performed by: FAMILY MEDICINE

## 2019-03-24 PROCEDURE — 82550 ASSAY OF CK (CPK): CPT

## 2019-03-24 PROCEDURE — 6360000002 HC RX W HCPCS: Performed by: FAMILY MEDICINE

## 2019-03-24 PROCEDURE — 36415 COLL VENOUS BLD VENIPUNCTURE: CPT

## 2019-03-24 RX ORDER — TOPIRAMATE 25 MG/1
25 TABLET ORAL NIGHTLY
Qty: 60 TABLET | Refills: 3 | Status: SHIPPED | OUTPATIENT
Start: 2019-03-24 | End: 2019-04-16 | Stop reason: ALTCHOICE

## 2019-03-24 RX ORDER — HYDROCODONE BITARTRATE AND ACETAMINOPHEN 5; 325 MG/1; MG/1
1 TABLET ORAL EVERY 4 HOURS PRN
Qty: 30 TABLET | Refills: 0 | Status: SHIPPED | OUTPATIENT
Start: 2019-03-24 | End: 2019-03-29

## 2019-03-24 RX ORDER — METHYLPREDNISOLONE 4 MG/1
TABLET ORAL
Qty: 1 KIT | Refills: 0 | Status: SHIPPED | OUTPATIENT
Start: 2019-03-24 | End: 2019-03-30

## 2019-03-24 RX ADMIN — HYDROCODONE BITARTRATE AND ACETAMINOPHEN 2 TABLET: 5; 325 TABLET ORAL at 18:01

## 2019-03-24 RX ADMIN — ASPIRIN 81 MG 81 MG: 81 TABLET ORAL at 09:43

## 2019-03-24 RX ADMIN — CLINDAMYCIN HYDROCHLORIDE 300 MG: 150 CAPSULE ORAL at 09:43

## 2019-03-24 RX ADMIN — CLINDAMYCIN HYDROCHLORIDE 300 MG: 150 CAPSULE ORAL at 22:00

## 2019-03-24 RX ADMIN — HYDROCODONE BITARTRATE AND ACETAMINOPHEN 2 TABLET: 5; 325 TABLET ORAL at 03:05

## 2019-03-24 RX ADMIN — PROPRANOLOL HYDROCHLORIDE 60 MG: 60 CAPSULE, EXTENDED RELEASE ORAL at 09:42

## 2019-03-24 RX ADMIN — GABAPENTIN 800 MG: 800 TABLET ORAL at 22:00

## 2019-03-24 RX ADMIN — Medication 10 ML: at 22:05

## 2019-03-24 RX ADMIN — HYDROCODONE BITARTRATE AND ACETAMINOPHEN 2 TABLET: 5; 325 TABLET ORAL at 07:14

## 2019-03-24 RX ADMIN — SODIUM CHLORIDE 200 MG: 9 INJECTION, SOLUTION INTRAVENOUS at 05:45

## 2019-03-24 RX ADMIN — HYDROCODONE BITARTRATE AND ACETAMINOPHEN 2 TABLET: 5; 325 TABLET ORAL at 22:01

## 2019-03-24 RX ADMIN — AMLODIPINE BESYLATE 5 MG: 10 TABLET ORAL at 09:44

## 2019-03-24 RX ADMIN — HYDROCODONE BITARTRATE AND ACETAMINOPHEN 2 TABLET: 5; 325 TABLET ORAL at 13:51

## 2019-03-24 RX ADMIN — TOPIRAMATE 25 MG: 25 TABLET, FILM COATED ORAL at 22:00

## 2019-03-24 RX ADMIN — GABAPENTIN 800 MG: 800 TABLET ORAL at 13:52

## 2019-03-24 RX ADMIN — GABAPENTIN 800 MG: 800 TABLET ORAL at 18:02

## 2019-03-24 RX ADMIN — GABAPENTIN 800 MG: 800 TABLET ORAL at 09:42

## 2019-03-24 RX ADMIN — CLINDAMYCIN HYDROCHLORIDE 300 MG: 150 CAPSULE ORAL at 18:01

## 2019-03-24 RX ADMIN — CLINDAMYCIN HYDROCHLORIDE 300 MG: 150 CAPSULE ORAL at 13:52

## 2019-03-24 RX ADMIN — Medication 10 ML: at 09:45

## 2019-03-24 ASSESSMENT — PAIN SCALES - GENERAL
PAINLEVEL_OUTOF10: 7
PAINLEVEL_OUTOF10: 7
PAINLEVEL_OUTOF10: 8
PAINLEVEL_OUTOF10: 7
PAINLEVEL_OUTOF10: 6
PAINLEVEL_OUTOF10: 7
PAINLEVEL_OUTOF10: 8

## 2019-03-24 ASSESSMENT — PAIN DESCRIPTION - LOCATION: LOCATION: HEAD

## 2019-03-24 ASSESSMENT — PAIN DESCRIPTION - PAIN TYPE: TYPE: ACUTE PAIN

## 2019-03-24 ASSESSMENT — PAIN DESCRIPTION - DESCRIPTORS: DESCRIPTORS: HEADACHE

## 2019-03-25 VITALS
OXYGEN SATURATION: 99 % | RESPIRATION RATE: 18 BRPM | BODY MASS INDEX: 21.03 KG/M2 | HEART RATE: 53 BPM | HEIGHT: 69 IN | DIASTOLIC BLOOD PRESSURE: 103 MMHG | WEIGHT: 142 LBS | SYSTOLIC BLOOD PRESSURE: 180 MMHG | TEMPERATURE: 97.9 F

## 2019-03-25 PROCEDURE — 99233 SBSQ HOSP IP/OBS HIGH 50: CPT | Performed by: PSYCHIATRY & NEUROLOGY

## 2019-03-25 PROCEDURE — 94760 N-INVAS EAR/PLS OXIMETRY 1: CPT

## 2019-03-25 PROCEDURE — G0378 HOSPITAL OBSERVATION PER HR: HCPCS

## 2019-03-25 PROCEDURE — 94640 AIRWAY INHALATION TREATMENT: CPT

## 2019-03-25 PROCEDURE — 6370000000 HC RX 637 (ALT 250 FOR IP): Performed by: STUDENT IN AN ORGANIZED HEALTH CARE EDUCATION/TRAINING PROGRAM

## 2019-03-25 RX ORDER — HYDROCHLOROTHIAZIDE 25 MG/1
12.5 TABLET ORAL DAILY
Qty: 30 TABLET | Refills: 1 | Status: SHIPPED | OUTPATIENT
Start: 2019-03-25 | End: 2019-04-19 | Stop reason: ALTCHOICE

## 2019-03-25 RX ORDER — IPRATROPIUM BROMIDE AND ALBUTEROL SULFATE 2.5; .5 MG/3ML; MG/3ML
SOLUTION RESPIRATORY (INHALATION)
Status: DISCONTINUED
Start: 2019-03-25 | End: 2019-03-25 | Stop reason: HOSPADM

## 2019-03-25 RX ADMIN — AMLODIPINE BESYLATE 5 MG: 10 TABLET ORAL at 08:00

## 2019-03-25 RX ADMIN — GABAPENTIN 800 MG: 800 TABLET ORAL at 13:34

## 2019-03-25 RX ADMIN — ASPIRIN 81 MG 81 MG: 81 TABLET ORAL at 08:00

## 2019-03-25 RX ADMIN — HYDROCODONE BITARTRATE AND ACETAMINOPHEN 2 TABLET: 5; 325 TABLET ORAL at 06:33

## 2019-03-25 RX ADMIN — HYDROCODONE BITARTRATE AND ACETAMINOPHEN 2 TABLET: 5; 325 TABLET ORAL at 02:40

## 2019-03-25 RX ADMIN — HYDROCODONE BITARTRATE AND ACETAMINOPHEN 2 TABLET: 5; 325 TABLET ORAL at 10:28

## 2019-03-25 RX ADMIN — CLINDAMYCIN HYDROCHLORIDE 300 MG: 150 CAPSULE ORAL at 13:34

## 2019-03-25 RX ADMIN — GABAPENTIN 800 MG: 800 TABLET ORAL at 08:00

## 2019-03-25 RX ADMIN — PROPRANOLOL HYDROCHLORIDE 60 MG: 60 CAPSULE, EXTENDED RELEASE ORAL at 07:59

## 2019-03-25 RX ADMIN — CLINDAMYCIN HYDROCHLORIDE 300 MG: 150 CAPSULE ORAL at 08:00

## 2019-03-25 ASSESSMENT — PAIN SCALES - GENERAL
PAINLEVEL_OUTOF10: 5
PAINLEVEL_OUTOF10: 6
PAINLEVEL_OUTOF10: 5
PAINLEVEL_OUTOF10: 6
PAINLEVEL_OUTOF10: 8
PAINLEVEL_OUTOF10: 7
PAINLEVEL_OUTOF10: 7

## 2019-04-16 ENCOUNTER — OFFICE VISIT (OUTPATIENT)
Dept: NEUROLOGY | Age: 59
End: 2019-04-16
Payer: MEDICARE

## 2019-04-16 VITALS
BODY MASS INDEX: 21.45 KG/M2 | DIASTOLIC BLOOD PRESSURE: 105 MMHG | WEIGHT: 144.8 LBS | SYSTOLIC BLOOD PRESSURE: 158 MMHG | HEIGHT: 69 IN | HEART RATE: 63 BPM

## 2019-04-16 DIAGNOSIS — M54.2 NECK PAIN: ICD-10-CM

## 2019-04-16 DIAGNOSIS — G89.29 CHRONIC BILATERAL LOW BACK PAIN WITHOUT SCIATICA: ICD-10-CM

## 2019-04-16 DIAGNOSIS — G43.019 INTRACTABLE MIGRAINE WITHOUT AURA AND WITHOUT STATUS MIGRAINOSUS: ICD-10-CM

## 2019-04-16 DIAGNOSIS — G44.211 INTRACTABLE EPISODIC TENSION-TYPE HEADACHE: Primary | ICD-10-CM

## 2019-04-16 DIAGNOSIS — M54.50 CHRONIC BILATERAL LOW BACK PAIN WITHOUT SCIATICA: ICD-10-CM

## 2019-04-16 PROCEDURE — G8427 DOCREV CUR MEDS BY ELIG CLIN: HCPCS | Performed by: PSYCHIATRY & NEUROLOGY

## 2019-04-16 PROCEDURE — 3017F COLORECTAL CA SCREEN DOC REV: CPT | Performed by: PSYCHIATRY & NEUROLOGY

## 2019-04-16 PROCEDURE — G8420 CALC BMI NORM PARAMETERS: HCPCS | Performed by: PSYCHIATRY & NEUROLOGY

## 2019-04-16 PROCEDURE — 99215 OFFICE O/P EST HI 40 MIN: CPT | Performed by: PSYCHIATRY & NEUROLOGY

## 2019-04-16 PROCEDURE — 1036F TOBACCO NON-USER: CPT | Performed by: PSYCHIATRY & NEUROLOGY

## 2019-04-16 PROCEDURE — 1111F DSCHRG MED/CURRENT MED MERGE: CPT | Performed by: PSYCHIATRY & NEUROLOGY

## 2019-04-16 RX ORDER — PROPRANOLOL HCL 60 MG
60 CAPSULE, EXTENDED RELEASE 24HR ORAL DAILY
Qty: 30 CAPSULE | Refills: 3 | Status: SHIPPED | OUTPATIENT
Start: 2019-04-16 | End: 2019-06-27 | Stop reason: SDUPTHER

## 2019-04-16 RX ORDER — PREDNISONE 20 MG/1
TABLET ORAL
Qty: 26 TABLET | Refills: 0 | Status: SHIPPED | OUTPATIENT
Start: 2019-04-16 | End: 2019-04-26
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
## 2019-04-16 NOTE — PROGRESS NOTES
NEUROLOGY FOLLOW-UP  Patient Name:  Ochoa Bunch  :   1960  Clinic Visit Date: 2019        REVIEW OF SYSTEMS  Constitutional Weight changes: present, Fevers :absent, Fatigue: absent; Any recent hospitalizations:  present.    HEENT Ears: ringing, Eyes: glasses, Visual disturbance: absent   Reespiratory Shortness of breath: present, Cough: present   Cardivascular Chest pain: absent, Leg swelling :absent   GI Constipation: absent, Diarrhea: absent, Swallowing change: absent    Urinary frequency: absent, Urinary urgency: absent, Urinary incontinence: absent   Musculoskeletal Neck pain: present, Back pain: present, Stiffness: present, Muscle pain: present, Joint pain: present   Dermatological Hair loss: absent, Skin changes: present   Neurological Memory loss: absent, Confusion: absent, Seizures: absent; Trouble walking or imbalance: present, Dizziness: present, Weakness: present, Numbness present, Tremor: absent, Spasm: absent, Speech difficulty: absent, Headache: present, Light sensitivity: present   Psychiatric Anxiety: absent, Depression  absent, Suicidal ideations absent   Hematologic Abnormal bleeding: absent, Anemia: absent, Clotting disorder: absent, Lymph gland changes: absent

## 2019-04-19 ENCOUNTER — OFFICE VISIT (OUTPATIENT)
Dept: NEUROSURGERY | Age: 59
End: 2019-04-19
Payer: MEDICARE

## 2019-04-19 VITALS
BODY MASS INDEX: 21.24 KG/M2 | DIASTOLIC BLOOD PRESSURE: 82 MMHG | SYSTOLIC BLOOD PRESSURE: 133 MMHG | WEIGHT: 143.8 LBS | HEART RATE: 59 BPM

## 2019-04-19 DIAGNOSIS — R26.9 GAIT DIFFICULTY: ICD-10-CM

## 2019-04-19 DIAGNOSIS — M48.062 NEUROGENIC CLAUDICATION DUE TO LUMBAR SPINAL STENOSIS: ICD-10-CM

## 2019-04-19 DIAGNOSIS — M47.22 CERVICAL SPONDYLOSIS WITH RADICULOPATHY: Primary | ICD-10-CM

## 2019-04-19 PROCEDURE — 99202 OFFICE O/P NEW SF 15 MIN: CPT | Performed by: PHYSICIAN ASSISTANT

## 2019-04-19 PROCEDURE — G8420 CALC BMI NORM PARAMETERS: HCPCS | Performed by: PHYSICIAN ASSISTANT

## 2019-04-19 PROCEDURE — G8427 DOCREV CUR MEDS BY ELIG CLIN: HCPCS | Performed by: PHYSICIAN ASSISTANT
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
## 2019-05-01 ENCOUNTER — HOSPITAL ENCOUNTER (OUTPATIENT)
Age: 59
Setting detail: SPECIMEN
Discharge: HOME | End: 2019-05-01
Payer: MEDICARE

## 2019-05-01 DIAGNOSIS — Z79.891 LONG TERM (CURRENT) USE OF OPIATE ANALGESIC: ICD-10-CM

## 2019-05-01 DIAGNOSIS — Z79.899 MEDICATION MANAGEMENT: ICD-10-CM

## 2019-05-01 LAB
AMPHETAMINE SCREEN URINE: NEGATIVE
BARBITURATE SCREEN URINE: NEGATIVE
BENZODIAZEPINE SCREEN, URINE: NEGATIVE
BUPRENORPHINE URINE: NORMAL
CANNABINOID SCREEN URINE: NEGATIVE
COCAINE METABOLITE, URINE: NEGATIVE
MDMA URINE: NORMAL
METHADONE SCREEN, URINE: NEGATIVE
METHAMPHETAMINE, URINE: NORMAL
OPIATES, URINE: NEGATIVE
OXYCODONE SCREEN URINE: NEGATIVE
PHENCYCLIDINE, URINE: NEGATIVE
PROPOXYPHENE, URINE: NORMAL
TEST INFORMATION: NORMAL
TRICYCLIC ANTIDEPRESSANTS, UR: NORMAL

## 2019-05-20 ENCOUNTER — OFFICE VISIT (OUTPATIENT)
Dept: NEUROSURGERY | Age: 59
End: 2019-05-20
Payer: MEDICARE

## 2019-05-20 VITALS
SYSTOLIC BLOOD PRESSURE: 131 MMHG | DIASTOLIC BLOOD PRESSURE: 89 MMHG | BODY MASS INDEX: 20.96 KG/M2 | HEART RATE: 53 BPM | WEIGHT: 142 LBS

## 2019-05-20 DIAGNOSIS — R26.81 GAIT INSTABILITY: ICD-10-CM

## 2019-05-20 DIAGNOSIS — M47.22 CERVICAL SPONDYLOSIS WITH RADICULOPATHY: ICD-10-CM

## 2019-05-20 DIAGNOSIS — M25.551 HIP PAIN, BILATERAL: ICD-10-CM

## 2019-05-20 DIAGNOSIS — M48.062 SPINAL STENOSIS, LUMBAR REGION, WITH NEUROGENIC CLAUDICATION: ICD-10-CM

## 2019-05-20 DIAGNOSIS — Z98.1 HISTORY OF THORACIC SPINAL FUSION: Primary | ICD-10-CM

## 2019-05-20 DIAGNOSIS — M25.552 HIP PAIN, BILATERAL: ICD-10-CM

## 2019-05-20 PROCEDURE — 1036F TOBACCO NON-USER: CPT | Performed by: PHYSICIAN ASSISTANT

## 2019-05-20 PROCEDURE — 99214 OFFICE O/P EST MOD 30 MIN: CPT | Performed by: PHYSICIAN ASSISTANT

## 2019-05-20 PROCEDURE — G8427 DOCREV CUR MEDS BY ELIG CLIN: HCPCS | Performed by: PHYSICIAN ASSISTANT

## 2019-05-20 PROCEDURE — G8420 CALC BMI NORM PARAMETERS: HCPCS | Performed by: PHYSICIAN ASSISTANT

## 2019-05-20 PROCEDURE — 3017F COLORECTAL CA SCREEN DOC REV: CPT | Performed by: PHYSICIAN ASSISTANT

## 2019-05-20 NOTE — PATIENT INSTRUCTIONS
For your next appointment, do not forget to bring:  Insurance card  Photo ID  List of Medications  Copay payment (if required)  Please note: any orders placed today are due prior to your next appointment. If orders are incomplete it is likely your next appointment will be canceled.
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
## 2019-05-30 ENCOUNTER — HOSPITAL ENCOUNTER (OUTPATIENT)
Dept: NEUROLOGY | Age: 59
Discharge: HOME | End: 2019-05-30
Payer: MEDICARE

## 2019-05-30 ENCOUNTER — HOSPITAL ENCOUNTER (OUTPATIENT)
Dept: MRI IMAGING | Age: 59
Discharge: HOME | End: 2019-06-01
Payer: MEDICARE

## 2019-05-30 DIAGNOSIS — M48.062 NEUROGENIC CLAUDICATION DUE TO LUMBAR SPINAL STENOSIS: ICD-10-CM

## 2019-05-30 DIAGNOSIS — M47.22 CERVICAL SPONDYLOSIS WITH RADICULOPATHY: ICD-10-CM

## 2019-05-30 DIAGNOSIS — R26.9 GAIT DIFFICULTY: ICD-10-CM

## 2019-05-30 PROCEDURE — 72148 MRI LUMBAR SPINE W/O DYE: CPT

## 2019-05-30 PROCEDURE — 95910 NRV CNDJ TEST 7-8 STUDIES: CPT

## 2019-05-30 PROCEDURE — 95886 MUSC TEST DONE W/N TEST COMP: CPT

## 2019-06-24 ENCOUNTER — OFFICE VISIT (OUTPATIENT)
Dept: NEUROSURGERY | Age: 59
End: 2019-06-24
Payer: MEDICARE

## 2019-06-24 VITALS
DIASTOLIC BLOOD PRESSURE: 94 MMHG | WEIGHT: 134 LBS | SYSTOLIC BLOOD PRESSURE: 147 MMHG | BODY MASS INDEX: 19.78 KG/M2 | HEART RATE: 49 BPM

## 2019-06-24 DIAGNOSIS — G89.29 CHRONIC MIDLINE BACK PAIN, UNSPECIFIED BACK LOCATION: ICD-10-CM

## 2019-06-24 DIAGNOSIS — R26.81 GAIT INSTABILITY: ICD-10-CM

## 2019-06-24 DIAGNOSIS — M54.9 CHRONIC MIDLINE BACK PAIN, UNSPECIFIED BACK LOCATION: ICD-10-CM

## 2019-06-24 DIAGNOSIS — G56.21 NEUROPATHY, ULNAR AT ELBOW, RIGHT: ICD-10-CM

## 2019-06-24 DIAGNOSIS — M43.06 LUMBAR SPONDYLOLYSIS: ICD-10-CM

## 2019-06-24 DIAGNOSIS — Z98.1 HISTORY OF THORACIC SPINAL FUSION: Primary | ICD-10-CM

## 2019-06-24 DIAGNOSIS — M47.22 CERVICAL SPONDYLOSIS WITH RADICULOPATHY: ICD-10-CM

## 2019-06-24 PROCEDURE — 99214 OFFICE O/P EST MOD 30 MIN: CPT | Performed by: PHYSICIAN ASSISTANT

## 2019-06-24 PROCEDURE — G8420 CALC BMI NORM PARAMETERS: HCPCS | Performed by: PHYSICIAN ASSISTANT

## 2019-06-24 PROCEDURE — 3017F COLORECTAL CA SCREEN DOC REV: CPT | Performed by: PHYSICIAN ASSISTANT

## 2019-06-24 PROCEDURE — G8427 DOCREV CUR MEDS BY ELIG CLIN: HCPCS | Performed by: PHYSICIAN ASSISTANT

## 2019-06-24 PROCEDURE — 1036F TOBACCO NON-USER: CPT | Performed by: PHYSICIAN ASSISTANT

## 2019-06-24 RX ORDER — ARM BRACE
EACH MISCELLANEOUS
Qty: 1 EACH | Refills: 0 | Status: SHIPPED | OUTPATIENT
Start: 2019-06-24 | End: 2019-11-06 | Stop reason: ALTCHOICE
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
## 2019-06-27 ENCOUNTER — OFFICE VISIT (OUTPATIENT)
Dept: NEUROLOGY | Age: 59
End: 2019-06-27
Payer: MEDICARE

## 2019-06-27 VITALS
DIASTOLIC BLOOD PRESSURE: 75 MMHG | WEIGHT: 132.2 LBS | HEIGHT: 69 IN | SYSTOLIC BLOOD PRESSURE: 107 MMHG | BODY MASS INDEX: 19.58 KG/M2 | HEART RATE: 60 BPM

## 2019-06-27 DIAGNOSIS — M62.838 MUSCLE SPASMS OF BOTH LOWER EXTREMITIES: ICD-10-CM

## 2019-06-27 DIAGNOSIS — G43.019 INTRACTABLE MIGRAINE WITHOUT AURA AND WITHOUT STATUS MIGRAINOSUS: Primary | ICD-10-CM

## 2019-06-27 DIAGNOSIS — M54.2 NECK PAIN: ICD-10-CM

## 2019-06-27 DIAGNOSIS — G44.211 INTRACTABLE EPISODIC TENSION-TYPE HEADACHE: ICD-10-CM

## 2019-06-27 DIAGNOSIS — G56.21 ULNAR NEUROPATHY AT ELBOW OF RIGHT UPPER EXTREMITY: ICD-10-CM

## 2019-06-27 PROCEDURE — 1036F TOBACCO NON-USER: CPT | Performed by: PSYCHIATRY & NEUROLOGY

## 2019-06-27 PROCEDURE — 99214 OFFICE O/P EST MOD 30 MIN: CPT | Performed by: PSYCHIATRY & NEUROLOGY

## 2019-06-27 PROCEDURE — G8427 DOCREV CUR MEDS BY ELIG CLIN: HCPCS | Performed by: PSYCHIATRY & NEUROLOGY

## 2019-06-27 PROCEDURE — G8420 CALC BMI NORM PARAMETERS: HCPCS | Performed by: PSYCHIATRY & NEUROLOGY

## 2019-06-27 PROCEDURE — 3017F COLORECTAL CA SCREEN DOC REV: CPT | Performed by: PSYCHIATRY & NEUROLOGY

## 2019-06-27 RX ORDER — PROPRANOLOL HCL 60 MG
60 CAPSULE, EXTENDED RELEASE 24HR ORAL DAILY
Qty: 30 CAPSULE | Refills: 3 | Status: SHIPPED | OUTPATIENT
Start: 2019-06-27 | End: 2019-11-06 | Stop reason: ALTCHOICE

## 2019-06-27 RX ORDER — CYCLOBENZAPRINE HCL 5 MG
5 TABLET ORAL NIGHTLY PRN
Qty: 30 TABLET | Refills: 0 | Status: SHIPPED | OUTPATIENT
Start: 2019-06-27 | End: 2019-07-07
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
## 2019-06-27 NOTE — PROGRESS NOTES
NEUROLOGY FOLLOW-UP  Patient Name:  Radha Poon  :   1960  Clinic Visit Date: 2019        REVIEW OF SYSTEMS  Constitutional Weight changes: present, Fevers : absent, Fatigue: absent; Any recent hospitalizations:  absent.    HEENT Ears: pain and ringing, Eyes: , Visual disturbance: present   Reespiratory Shortness of breath: present, Cough: present   Cardivascular Chest pain: absent, Leg swelling :absent   GI Constipation: absent, Diarrhea: absent, Swallowing change: absent    Urinary frequency: absent, Urinary urgency: absent, Urinary incontinence: absent   Musculoskeletal Neck pain: present, Back pain: present, Stiffness: present, Muscle pain: present, Joint pain: present   Dermatological Hair loss: absent, Skin changes: absent   Neurological Memory loss: absent, Confusion: present, Seizures: absent; Trouble walking or imbalance: present, Dizziness: present, Weakness: absent, Numbness absent, Tremor: absent, Spasm: absent, Speech difficulty: present, Headache: present, Light sensitivity: absent   Psychiatric Anxiety: absent, Depression  absent, Suicidal ideations absent   Hematologic Abnormal bleeding: absent, Anemia: absent, Clotting disorder: absent, Lymph gland changes: absent

## 2019-07-15 ENCOUNTER — HOSPITAL ENCOUNTER (EMERGENCY)
Age: 59
Discharge: HOME | End: 2019-07-15
Attending: EMERGENCY MEDICINE
Payer: MEDICARE

## 2019-07-15 ENCOUNTER — HOSPITAL ENCOUNTER (OUTPATIENT)
Dept: PHYSICAL THERAPY | Age: 59
Setting detail: THERAPIES SERIES
End: 2019-07-15
Payer: MEDICARE

## 2019-07-15 VITALS
RESPIRATION RATE: 16 BRPM | DIASTOLIC BLOOD PRESSURE: 115 MMHG | OXYGEN SATURATION: 100 % | HEIGHT: 69 IN | TEMPERATURE: 97.8 F | SYSTOLIC BLOOD PRESSURE: 173 MMHG | BODY MASS INDEX: 19.26 KG/M2 | HEART RATE: 66 BPM | WEIGHT: 130 LBS

## 2019-07-15 DIAGNOSIS — K04.7 DENTAL ABSCESS: Primary | ICD-10-CM

## 2019-07-15 PROCEDURE — 41800 DRAINAGE OF GUM LESION: CPT

## 2019-07-15 PROCEDURE — 99282 EMERGENCY DEPT VISIT SF MDM: CPT

## 2019-07-15 PROCEDURE — 6370000000 HC RX 637 (ALT 250 FOR IP): Performed by: PHYSICIAN ASSISTANT

## 2019-07-15 RX ORDER — CHLORHEXIDINE GLUCONATE 0.12 MG/ML
15 RINSE ORAL 2 TIMES DAILY
Qty: 420 ML | Refills: 0 | Status: SHIPPED | OUTPATIENT
Start: 2019-07-15 | End: 2019-07-29

## 2019-07-15 RX ORDER — IBUPROFEN 600 MG/1
600 TABLET ORAL EVERY 6 HOURS PRN
Qty: 30 TABLET | Refills: 0 | Status: SHIPPED | OUTPATIENT
Start: 2019-07-15 | End: 2019-11-06 | Stop reason: ALTCHOICE

## 2019-07-15 RX ORDER — PENICILLIN V POTASSIUM 500 MG/1
500 TABLET ORAL 4 TIMES DAILY
Qty: 40 TABLET | Refills: 0 | Status: SHIPPED | OUTPATIENT
Start: 2019-07-15 | End: 2019-08-01

## 2019-07-15 RX ORDER — ACETAMINOPHEN AND CODEINE PHOSPHATE 300; 30 MG/1; MG/1
1 TABLET ORAL ONCE
Status: COMPLETED | OUTPATIENT
Start: 2019-07-15 | End: 2019-07-15

## 2019-07-15 RX ORDER — IBUPROFEN 600 MG/1
600 TABLET ORAL ONCE
Status: COMPLETED | OUTPATIENT
Start: 2019-07-15 | End: 2019-07-15

## 2019-07-15 RX ORDER — PENICILLIN V POTASSIUM 250 MG/1
500 TABLET ORAL ONCE
Status: COMPLETED | OUTPATIENT
Start: 2019-07-15 | End: 2019-07-15

## 2019-07-15 RX ADMIN — PENICILLIN V POTASIUM 500 MG: 250 TABLET ORAL at 12:55

## 2019-07-15 RX ADMIN — ACETAMINOPHEN AND CODEINE PHOSPHATE 1 TABLET: 300; 30 TABLET ORAL at 12:56

## 2019-07-15 RX ADMIN — IBUPROFEN 600 MG: 600 TABLET, FILM COATED ORAL at 12:55

## 2019-07-15 ASSESSMENT — ENCOUNTER SYMPTOMS
FACIAL SWELLING: 0
TRISMUS: 0

## 2019-07-15 ASSESSMENT — PAIN SCALES - GENERAL: PAINLEVEL_OUTOF10: 9

## 2019-07-15 NOTE — ED PROVIDER NOTES
[address] ED  eMERGENCY dEPARTMENT eNCOUnter   Independent Attestation     Pt Name: Ailyn Grimm  MRN: [id number]  [place] 1960  Date of evaluation: 7/15/19       Ailyn Grimm is a 61 y.o. male who presents with Facial Pain        Based on the medical record, the care appears appropriate. I was personally available for consultation in the Emergency Department.     Baron Decker MD  Attending Emergency  Physician                  Baron Decker MD  07/15/19 8216
aortic aneurysm repair/replacement 26mm Hemashiled under cpb    KNEE ARTHROSCOPY Right 2012    KNEE ARTHROSCOPY Left 2003    OTHER SURGICAL HISTORY      spinal cord injury/rods put in    SKIN GRAFT      THORACIC AORTIC ANEURYSM REPAIR  2012    TONSILLECTOMY AND ADENOIDECTOMY      TOTAL KNEE ARTHROPLASTY Left 3/3/2015       CURRENT MEDICATIONS       Discharge Medication List as of 7/15/2019 12:48 PM      CONTINUE these medications which have NOT CHANGED    Details   oxyCODONE-acetaminophen (PERCOCET) 5-325 MG per tablet Take 1 tablet by mouth every 8 hours as needed for Pain for up to 30 days. Take lowest dose possible to manage pain, Disp-90 tablet, R-0Normal      propranolol (INDERAL LA) 60 MG extended release capsule Take 1 capsule by mouth daily, Disp-30 capsule, R-3Normal      Elastic Bandages & Supports (ACE ELBOW BRACE) MISC Disp-1 each, R-0, PrintWear on right elbow at night      gabapentin (NEURONTIN) 800 MG tablet TAKE ONE TABLET BY MOUTH FOUR TIMES A DAY, Disp-120 tablet, R-2Normal      butalbital-acetaminophen-caffeine (FIORICET, ESGIC) -40 MG per tablet Take 1 tablet by mouth every 4 hours as needed for Headaches, Disp-60 tablet, R-3Normal      gabapentin (NEURONTIN) 800 MG tablet Take 1 tablet by mouth 4 times daily for 30 days. , Disp-120 tablet, R-0Normal      amLODIPine (NORVASC) 5 MG tablet TAKE 1 TABLET BY MOUTH DAILY, Disp-90 tablet, R-1Normal      Blood Pressure KIT Disp-1 kit, R-0, PrintDiagnosis: HTN. Needs to check blood pressure 1-2 times a day until stable, then once a day.  Goal blood pressure less than 135/85, and above 110/60.      albuterol (PROVENTIL) (5 MG/ML) 0.5% nebulizer solution Take 1 mL by nebulization 4 times daily as needed for Wheezing, Disp-120 each, R-3Normal      aspirin 81 MG chewable tablet Take 81 mg by mouth dailyHistorical Med             ALLERGIES     is allergic to bactrim [sulfamethoxazole-trimethoprim]; keflex [cephalexin]; aleve [naproxen]; lyrica

## 2019-07-22 ENCOUNTER — HOSPITAL ENCOUNTER (OUTPATIENT)
Dept: PHYSICAL THERAPY | Age: 59
Setting detail: THERAPIES SERIES
Discharge: HOME | End: 2019-07-22
Payer: MEDICARE

## 2019-07-22 PROCEDURE — 97162 PT EVAL MOD COMPLEX 30 MIN: CPT

## 2019-07-23 ASSESSMENT — PAIN DESCRIPTION - LOCATION: LOCATION: NECK;SHOULDER

## 2019-07-23 ASSESSMENT — PAIN DESCRIPTION - ORIENTATION: ORIENTATION: RIGHT;LEFT

## 2019-07-23 ASSESSMENT — PAIN DESCRIPTION - PAIN TYPE: TYPE: CHRONIC PAIN

## 2019-07-23 ASSESSMENT — PAIN SCALES - GENERAL: PAINLEVEL_OUTOF10: 8

## 2019-07-23 ASSESSMENT — PAIN DESCRIPTION - FREQUENCY: FREQUENCY: CONTINUOUS

## 2019-07-23 NOTE — PROGRESS NOTES
Therapy       []  Ther Activities       []  Aquatics     []  Vasopneumatic Device     []  Neuro Re-Ed       []  Other       Total Treatment Time: 45  1     Patient Goals: Improved function    Comments/Assessment: Patient may benefit from therapy including possible aquatic or land based therapy to work on ROM, strength, functional limitation. Rehab Potential:  [] Good  [x] Fair  [] Poor   Suggested Professional Referral:  [x] No  [] Yes:  Barriers to Goal Achievement:  [x] No  [] Yes:  Domestic Concerns:  [x] No  [] Yes:    Treatment Plan:  [x] Therapeutic Exercise    [] Modalities:  [] Therapeutic Activity    [] Ultrasound  [] Electrical Stimulation  [] Gait Training     [] Massage       [] Lumbar/Cervical Traction  [x] Neuromuscular Re-education [] Cold/hot pack [] Other:  [x] Instruction in HEP              [] Work Conditioning                                  [x] Manual Therapy                     [x] Aquatic Therapy if needed     [] Vasocompression  [] Iontophoresis: 4 mg/mL                      Dexamethasone Sodium      Phosphate 40-80mAmin (Allergies Reviewed)     Frequency:     2-3      X/wk x     12 visits      [x] Plans/Goals, Risk/Benefits discussed with pt/family  Comprehension of Education [x] yes  [] Needs Review  Pt/Family Education: [x] Verbal  [] Demo  [x] Written    More objective information is available upon request.  Thank you for this referral.        Medicare/Regulatory Requirements:  I have reviewed this plan of care and certify a need for   Medically necessary rehabilitation services.   [x] Physician Signature    Date:7/22/19     Electronically signed by: Victorino Valerio, [address] @ [address] [facility] [place], [address] [facility]  Phone [phone]  Fax [phone]

## 2019-07-24 ENCOUNTER — OFFICE VISIT (OUTPATIENT)
Dept: NEUROSURGERY | Age: 59
End: 2019-07-24
Payer: MEDICARE

## 2019-07-24 VITALS
BODY MASS INDEX: 19.49 KG/M2 | SYSTOLIC BLOOD PRESSURE: 127 MMHG | DIASTOLIC BLOOD PRESSURE: 87 MMHG | WEIGHT: 132 LBS | HEART RATE: 52 BPM

## 2019-07-24 DIAGNOSIS — M47.22 CERVICAL SPONDYLOSIS WITH RADICULOPATHY: ICD-10-CM

## 2019-07-24 DIAGNOSIS — M54.9 CHRONIC MIDLINE BACK PAIN, UNSPECIFIED BACK LOCATION: Primary | ICD-10-CM

## 2019-07-24 DIAGNOSIS — R26.9 GAIT DIFFICULTY: ICD-10-CM

## 2019-07-24 DIAGNOSIS — M43.06 LUMBAR SPONDYLOLYSIS: ICD-10-CM

## 2019-07-24 DIAGNOSIS — G89.29 CHRONIC MIDLINE BACK PAIN, UNSPECIFIED BACK LOCATION: Primary | ICD-10-CM

## 2019-07-24 DIAGNOSIS — Z98.1 HISTORY OF THORACIC SPINAL FUSION: ICD-10-CM

## 2019-07-24 PROCEDURE — 99213 OFFICE O/P EST LOW 20 MIN: CPT | Performed by: PHYSICIAN ASSISTANT

## 2019-07-24 PROCEDURE — G8420 CALC BMI NORM PARAMETERS: HCPCS | Performed by: PHYSICIAN ASSISTANT

## 2019-07-24 PROCEDURE — G8427 DOCREV CUR MEDS BY ELIG CLIN: HCPCS | Performed by: PHYSICIAN ASSISTANT

## 2019-07-24 PROCEDURE — 3017F COLORECTAL CA SCREEN DOC REV: CPT | Performed by: PHYSICIAN ASSISTANT

## 2019-07-24 PROCEDURE — 1036F TOBACCO NON-USER: CPT | Performed by: PHYSICIAN ASSISTANT

## 2019-07-24 NOTE — PROGRESS NOTES
9/24/12    Ascending aortic aneurysm repair/replacement 26mm Hemashiled under cpb    KNEE ARTHROSCOPY Right 2012    KNEE ARTHROSCOPY Left 2003    OTHER SURGICAL HISTORY      spinal cord injury/rods put in    SKIN GRAFT      THORACIC AORTIC ANEURYSM REPAIR  2012    TONSILLECTOMY AND ADENOIDECTOMY      TOTAL KNEE ARTHROPLASTY Left 3/3/2015     Family History   Problem Relation Age of Onset    Heart Disease Mother     Diabetes Mother     Cancer Sister         colon-uterine   Urena Diabetes Sister     Kidney Disease Sister     Diabetes Sister     Diabetes Sister     Diabetes Brother      Current Outpatient Medications on File Prior to Visit   Medication Sig Dispense Refill    penicillin v potassium (VEETID) 500 MG tablet Take 1 tablet by mouth 4 times daily 40 tablet 0    ibuprofen (IBU) 600 MG tablet Take 1 tablet by mouth every 6 hours as needed for Pain 30 tablet 0    chlorhexidine (PERIDEX) 0.12 % solution Take 15 mLs by mouth 2 times daily for 14 days 420 mL 0    oxyCODONE-acetaminophen (PERCOCET) 5-325 MG per tablet Take 1 tablet by mouth every 8 hours as needed for Pain for up to 30 days. Take lowest dose possible to manage pain 90 tablet 0    propranolol (INDERAL LA) 60 MG extended release capsule Take 1 capsule by mouth daily 30 capsule 3    Elastic Bandages & Supports (ACE ELBOW BRACE) MISC Wear on right elbow at night 1 each 0    butalbital-acetaminophen-caffeine (FIORICET, ESGIC) -40 MG per tablet Take 1 tablet by mouth every 4 hours as needed for Headaches 60 tablet 3    amLODIPine (NORVASC) 5 MG tablet TAKE 1 TABLET BY MOUTH DAILY 90 tablet 1    Blood Pressure KIT Diagnosis: HTN. Needs to check blood pressure 1-2 times a day until stable, then once a day.  Goal blood pressure less than 135/85, and above 110/60. 1 kit 0    albuterol (PROVENTIL) (5 MG/ML) 0.5% nebulizer solution Take 1 mL by nebulization 4 times daily as needed for Wheezing 120 each 3    aspirin 81 MG chewable tablet Take 81 mg by mouth daily      gabapentin (NEURONTIN) 800 MG tablet TAKE ONE TABLET BY MOUTH FOUR TIMES A  tablet 2    gabapentin (NEURONTIN) 800 MG tablet Take 1 tablet by mouth 4 times daily for 30 days. 120 tablet 0     No current facility-administered medications on file prior to visit. Social History     Tobacco Use    Smoking status: Former Smoker     Packs/day: 1.00     Years: 22.00     Pack years: 22.00     Start date: 1972     Last attempt to quit: 1990     Years since quittin.9    Smokeless tobacco: Never Used   Substance Use Topics    Alcohol use: No     Alcohol/week: 0.0 standard drinks     Comment: stopped drinking Set.     Drug use: Yes     Frequency: 3.0 times per week     Types: Marijuana     Comment: marijuana occasionally       Allergies   Allergen Reactions    Bactrim [Sulfamethoxazole-Trimethoprim]      Lose muscle control. Patient had severe Sulfa and Keflex reaction for which he was admitted 14, had \" myocarditis and viral syndrome versus serum sickness from Keflex and Bactrim interaction\"    Keflex [Cephalexin]      Lose muscle control, Patient had severe Sulfa and Keflex reaction for which he was admitted 14, had \" myocarditis and viral syndrome versus serum sickness from Keflex and Bactrim interaction\" per notes in EPIC      Aleve [Naproxen] Hives    Lyrica [Pregabalin] Rash    Tramadol Itching    Vicodin [Hydrocodone-Acetaminophen] Itching       Review of Systems  Constitutional: Negative for activity change and appetite change. HEENT: Negative for ear pain and facial swelling. Eyes: Negative for discharge and itching. Respiratory: Negative for choking and chest tightness. Cardiovascular: Negative for chest pain and leg swelling. Gastrointestinal: Negative for nausea and abdominal pain. Endocrine: Negative for cold intolerance and heat intolerance. Genitourinary: Negative for frequency and flank pain. was seen today for follow-up. Diagnoses and all orders for this visit:    Chronic midline back pain, unspecified back location    History of thoracic spinal fusion    Gait difficulty    Cervical spondylosis with radiculopathy    Lumbar spondylolysis      Plan:   Mr. Anne Olivo is a 61 y.o. male being seen in the neurosurgery clinic today in f/u regarding cervical, thoracic, and lumbar spine related pain/complaints and significant decline in gait/function. Since his last appointment 4 weeks ago his symptoms are stable. Patient has completed PT evaluation and will start attending therapy this week. Pain management and MRI thoracic spine are pending. Orders in place    Diagnosis and plan discussed with the patient and all questions answered. Followup: Return in about 3 weeks (around 8/14/2019) for With Dr Josr Phillips, s/p thoracic MRI and pain management. Prescriptions Ordered:  No orders of the defined types were placed in this encounter. Orders Placed:  No orders of the defined types were placed in this encounter. Electronically signed by MEET Pugh on 7/24/2019 at 5:35 PM    Please note that this chart was generated using voice recognition Dragon dictation software. Although every effort was made to ensure the accuracy of this automated transcription, some errors in transcription may have occurred.

## 2019-07-26 ENCOUNTER — HOSPITAL ENCOUNTER (OUTPATIENT)
Dept: PHYSICAL THERAPY | Age: 59
Setting detail: THERAPIES SERIES
Discharge: HOME | End: 2019-07-26
Payer: MEDICARE

## 2019-07-26 PROCEDURE — 97110 THERAPEUTIC EXERCISES: CPT

## 2019-07-29 ENCOUNTER — HOSPITAL ENCOUNTER (OUTPATIENT)
Dept: PHYSICAL THERAPY | Age: 59
Setting detail: THERAPIES SERIES
Discharge: HOME | End: 2019-07-29
Payer: MEDICARE

## 2019-07-29 NOTE — PROGRESS NOTES
[address]   Outpatient Physical Therapy  Cancel/No Show Note    Date: 19    Patient Name: Kaylen Perez        MRN: [id number]  Account: [de-identified] : 1960      General Information:  Referring Practitioner: Liev De La Torre CNP  Diagnosis: (P) cervical spondylosis with radiculopathy M47.22; neurogenic claudication due to lumbar spinal stenosis  Onset Date: (P) 12  PT Insurance Information: (P) Medicare  Total # of Visits Approved: (P) 12  Total # of Visits to Date: (P) 2  No Show: (P) 0  Canceled Appointment: (P) 1        Comments: (P) Patient called the  and cancelled today's appointment; no reason relayed to SHARON Briceno PTA

## 2019-07-31 ENCOUNTER — HOSPITAL ENCOUNTER (OUTPATIENT)
Dept: PHYSICAL THERAPY | Age: 59
Setting detail: THERAPIES SERIES
Discharge: HOME | End: 2019-07-31
Payer: MEDICARE

## 2019-08-07 ENCOUNTER — HOSPITAL ENCOUNTER (OUTPATIENT)
Dept: PHYSICAL THERAPY | Age: 59
Setting detail: THERAPIES SERIES
Discharge: HOME | End: 2019-08-07
Payer: MEDICARE

## 2019-08-07 PROCEDURE — 97110 THERAPEUTIC EXERCISES: CPT

## 2019-08-07 NOTE — PROGRESS NOTES
usually when it gets really bad, it takes a couple of days to calm down. Therapy session ended early today. Treatment Plan (as of 7/22/19):  [x] Therapeutic Exercise    [] Modalities:  [] Therapeutic Activity    [] Ultrasound  [] Electrical Stimulation  [] Gait Training     [] Massage       [] Lumbar/Cervical Traction  [x] Neuromuscular Re-education [] Cold/hot pack [] Other:  [x] Instruction in HEP              [] Work Conditioning                                  [x] Manual Therapy                     [x] Aquatic Therapy if needed     [] Vasocompression       Goals (as of 7/22/19)  Short term goals  Time Frame for Short term goals: 6 visits  Short term goal 1: Improved balance and able to do Tandem balance up to 5 seconds  Short term goal 2: LE strength testing to 4/5 for (R) LE strength testing  Short term goal 3: IND and compliant with HEP  Short term goal 4: improved pain levels to 4-5/10  Short term goal 5: Improved neural tension testing grossly overall  Long term goals  Time Frame for Long term goals : 12 visits  Long term goal 1: 1/10 pain  Long term goal 2: 4+/5 LE strength testing  Long term goal 3: Normalized cervical ROM  Long term goal 4: End range limitation with neural tension testing only. Pt. Education:  [x] Yes  [] No  [] Reviewed Prior HEP/Ed  Method of Education: [x] Verbal  [x] Demo  [] Written  Comprehension of Education: Instruction in above exercises  [] Verbalizes understanding. [] Demonstrates understanding. [x] Needs review. [] Demonstrates/verbalizes HEP/Ed previously given. Plan: [x] Continue per plan of care.    [] Other:      Time In: 4705            Time Out: 5548    Electronically signed by:  Yosvany Linder PTA

## 2019-08-12 ENCOUNTER — HOSPITAL ENCOUNTER (OUTPATIENT)
Dept: MRI IMAGING | Age: 59
Discharge: HOME | End: 2019-08-14
Payer: MEDICARE

## 2019-08-12 ENCOUNTER — HOSPITAL ENCOUNTER (OUTPATIENT)
Dept: CT IMAGING | Age: 59
Discharge: HOME | End: 2019-08-14
Payer: MEDICARE

## 2019-08-12 DIAGNOSIS — R10.9 ABDOMINAL CRAMPING: ICD-10-CM

## 2019-08-12 DIAGNOSIS — R63.4 ABNORMAL WEIGHT LOSS: ICD-10-CM

## 2019-08-12 DIAGNOSIS — R63.0 APPETITE LOSS: ICD-10-CM

## 2019-08-12 DIAGNOSIS — Z98.1 HISTORY OF THORACIC SPINAL FUSION: ICD-10-CM

## 2019-08-12 DIAGNOSIS — R26.81 GAIT INSTABILITY: ICD-10-CM

## 2019-08-12 LAB
BUN BLDV-MCNC: 14 MG/DL (ref 6–20)
CREAT SERPL-MCNC: 0.79 MG/DL (ref 0.7–1.2)
GFR AFRICAN AMERICAN: >60 ML/MIN
GFR NON-AFRICAN AMERICAN: >60 ML/MIN
GFR SERPL CREATININE-BSD FRML MDRD: NORMAL ML/MIN/{1.73_M2}
GFR SERPL CREATININE-BSD FRML MDRD: NORMAL ML/MIN/{1.73_M2}

## 2019-08-12 PROCEDURE — 6360000004 HC RX CONTRAST MEDICATION: Performed by: NURSE PRACTITIONER

## 2019-08-12 PROCEDURE — 36415 COLL VENOUS BLD VENIPUNCTURE: CPT

## 2019-08-12 PROCEDURE — 72146 MRI CHEST SPINE W/O DYE: CPT

## 2019-08-12 PROCEDURE — 74177 CT ABD & PELVIS W/CONTRAST: CPT

## 2019-08-12 PROCEDURE — 82565 ASSAY OF CREATININE: CPT

## 2019-08-12 PROCEDURE — 84520 ASSAY OF UREA NITROGEN: CPT

## 2019-08-12 PROCEDURE — 2580000003 HC RX 258: Performed by: NURSE PRACTITIONER

## 2019-08-12 RX ORDER — 0.9 % SODIUM CHLORIDE 0.9 %
80 INTRAVENOUS SOLUTION INTRAVENOUS ONCE
Status: COMPLETED | OUTPATIENT
Start: 2019-08-12 | End: 2019-08-12

## 2019-08-12 RX ORDER — SODIUM CHLORIDE 0.9 % (FLUSH) 0.9 %
10 SYRINGE (ML) INJECTION PRN
Status: DISCONTINUED | OUTPATIENT
Start: 2019-08-12 | End: 2019-08-15 | Stop reason: HOSPADM

## 2019-08-12 RX ADMIN — IOVERSOL 75 ML: 741 INJECTION INTRA-ARTERIAL; INTRAVENOUS at 13:24

## 2019-08-12 RX ADMIN — Medication 10 ML: at 13:23

## 2019-08-12 RX ADMIN — SODIUM CHLORIDE 80 ML: 9 INJECTION, SOLUTION INTRAVENOUS at 13:23

## 2019-08-14 ENCOUNTER — HOSPITAL ENCOUNTER (OUTPATIENT)
Dept: PHYSICAL THERAPY | Age: 59
Setting detail: THERAPIES SERIES
Discharge: HOME | End: 2019-08-14
Payer: MEDICARE

## 2019-08-16 ENCOUNTER — HOSPITAL ENCOUNTER (OUTPATIENT)
Dept: PHYSICAL THERAPY | Age: 59
Setting detail: THERAPIES SERIES
Discharge: HOME | End: 2019-08-16
Payer: MEDICARE

## 2019-11-06 ENCOUNTER — OFFICE VISIT (OUTPATIENT)
Dept: NEUROLOGY | Age: 59
End: 2019-11-06
Payer: MEDICARE

## 2019-11-06 VITALS
SYSTOLIC BLOOD PRESSURE: 133 MMHG | BODY MASS INDEX: 19.98 KG/M2 | HEART RATE: 72 BPM | WEIGHT: 139.6 LBS | HEIGHT: 70 IN | DIASTOLIC BLOOD PRESSURE: 92 MMHG

## 2019-11-06 DIAGNOSIS — M54.2 NECK PAIN: ICD-10-CM

## 2019-11-06 DIAGNOSIS — G56.21 ULNAR NEUROPATHY AT ELBOW OF RIGHT UPPER EXTREMITY: ICD-10-CM

## 2019-11-06 DIAGNOSIS — M54.12 CERVICAL RADICULOPATHY AT C6: Primary | ICD-10-CM

## 2019-11-06 DIAGNOSIS — M62.838 MUSCLE SPASMS OF BOTH LOWER EXTREMITIES: ICD-10-CM

## 2019-11-06 PROCEDURE — 99214 OFFICE O/P EST MOD 30 MIN: CPT | Performed by: PSYCHIATRY & NEUROLOGY

## 2019-11-06 RX ORDER — METHYLPREDNISOLONE 4 MG/1
TABLET ORAL
Qty: 21 TABLET | Refills: 0 | Status: SHIPPED | OUTPATIENT
Start: 2019-11-06 | End: 2019-11-12

## 2019-12-06 ENCOUNTER — HOSPITAL ENCOUNTER (OUTPATIENT)
Age: 59
Discharge: HOME | End: 2019-12-06
Payer: MEDICARE

## 2019-12-06 DIAGNOSIS — M79.7 FIBROMYALGIA: ICD-10-CM

## 2019-12-06 DIAGNOSIS — I10 ESSENTIAL HYPERTENSION: ICD-10-CM

## 2019-12-06 DIAGNOSIS — R51.9 CHRONIC NONINTRACTABLE HEADACHE, UNSPECIFIED HEADACHE TYPE: ICD-10-CM

## 2019-12-06 DIAGNOSIS — G89.29 CHRONIC NONINTRACTABLE HEADACHE, UNSPECIFIED HEADACHE TYPE: ICD-10-CM

## 2019-12-06 DIAGNOSIS — Z12.5 PROSTATE CANCER SCREENING: ICD-10-CM

## 2019-12-06 DIAGNOSIS — E55.9 VITAMIN D INSUFFICIENCY: ICD-10-CM

## 2019-12-06 LAB
ABSOLUTE EOS #: 0.3 K/UL (ref 0–0.4)
ABSOLUTE IMMATURE GRANULOCYTE: ABNORMAL K/UL (ref 0–0.3)
ABSOLUTE LYMPH #: 1.6 K/UL (ref 1–4.8)
ABSOLUTE MONO #: 0.8 K/UL (ref 0.1–1.3)
ALBUMIN SERPL-MCNC: 4 G/DL (ref 3.5–5.2)
ALBUMIN/GLOBULIN RATIO: ABNORMAL (ref 1–2.5)
ALP BLD-CCNC: 63 U/L (ref 40–129)
ALT SERPL-CCNC: 9 U/L (ref 5–41)
ANION GAP SERPL CALCULATED.3IONS-SCNC: 9 MMOL/L (ref 9–17)
AST SERPL-CCNC: 13 U/L
BASOPHILS # BLD: 1 % (ref 0–2)
BASOPHILS ABSOLUTE: 0.1 K/UL (ref 0–0.2)
BILIRUB SERPL-MCNC: 0.28 MG/DL (ref 0.3–1.2)
BUN BLDV-MCNC: 14 MG/DL (ref 6–20)
BUN/CREAT BLD: ABNORMAL (ref 9–20)
CALCIUM SERPL-MCNC: 9.1 MG/DL (ref 8.6–10.4)
CHLORIDE BLD-SCNC: 103 MMOL/L (ref 98–107)
CHOLESTEROL, FASTING: 191 MG/DL
CHOLESTEROL/HDL RATIO: 4.2
CO2: 29 MMOL/L (ref 20–31)
CREAT SERPL-MCNC: 0.74 MG/DL (ref 0.7–1.2)
DIFFERENTIAL TYPE: ABNORMAL
EOSINOPHILS RELATIVE PERCENT: 3 % (ref 0–4)
FOLATE: 7.4 NG/ML
GFR AFRICAN AMERICAN: >60 ML/MIN
GFR NON-AFRICAN AMERICAN: >60 ML/MIN
GFR SERPL CREATININE-BSD FRML MDRD: ABNORMAL ML/MIN/{1.73_M2}
GFR SERPL CREATININE-BSD FRML MDRD: ABNORMAL ML/MIN/{1.73_M2}
GLUCOSE BLD-MCNC: 86 MG/DL (ref 70–99)
HCT VFR BLD CALC: 43.3 % (ref 41–53)
HDLC SERPL-MCNC: 45 MG/DL
HEMOGLOBIN: 14.6 G/DL (ref 13.5–17.5)
IMMATURE GRANULOCYTES: ABNORMAL %
LDL CHOLESTEROL: 133 MG/DL (ref 0–130)
LYMPHOCYTES # BLD: 16 % (ref 24–44)
MCH RBC QN AUTO: 32.2 PG (ref 26–34)
MCHC RBC AUTO-ENTMCNC: 33.8 G/DL (ref 31–37)
MCV RBC AUTO: 95.4 FL (ref 80–100)
MONOCYTES # BLD: 8 % (ref 1–7)
NRBC AUTOMATED: ABNORMAL PER 100 WBC
PDW BLD-RTO: 14.4 % (ref 11.5–14.9)
PLATELET # BLD: 226 K/UL (ref 150–450)
PLATELET ESTIMATE: ABNORMAL
PMV BLD AUTO: 8.8 FL (ref 6–12)
POTASSIUM SERPL-SCNC: 4.6 MMOL/L (ref 3.7–5.3)
PROSTATE SPECIFIC ANTIGEN: 0.74 UG/L
RBC # BLD: 4.53 M/UL (ref 4.5–5.9)
RBC # BLD: ABNORMAL 10*6/UL
SEG NEUTROPHILS: 72 % (ref 36–66)
SEGMENTED NEUTROPHILS ABSOLUTE COUNT: 7.4 K/UL (ref 1.3–9.1)
SODIUM BLD-SCNC: 141 MMOL/L (ref 135–144)
TOTAL PROTEIN: 6.8 G/DL (ref 6.4–8.3)
TRIGLYCERIDE, FASTING: 65 MG/DL
TSH SERPL DL<=0.05 MIU/L-ACNC: 1.19 MIU/L (ref 0.3–5)
VITAMIN B-12: 393 PG/ML (ref 232–1245)
VITAMIN D 25-HYDROXY: 16.9 NG/ML (ref 30–100)
VLDLC SERPL CALC-MCNC: ABNORMAL MG/DL (ref 1–30)
WBC # BLD: 10.2 K/UL (ref 3.5–11)
WBC # BLD: ABNORMAL 10*3/UL

## 2019-12-06 PROCEDURE — 82746 ASSAY OF FOLIC ACID SERUM: CPT

## 2019-12-06 PROCEDURE — 85025 COMPLETE CBC W/AUTO DIFF WBC: CPT

## 2019-12-06 PROCEDURE — 82607 VITAMIN B-12: CPT

## 2019-12-06 PROCEDURE — 80061 LIPID PANEL: CPT

## 2019-12-06 PROCEDURE — 82306 VITAMIN D 25 HYDROXY: CPT

## 2019-12-06 PROCEDURE — G0103 PSA SCREENING: HCPCS

## 2019-12-06 PROCEDURE — 80053 COMPREHEN METABOLIC PANEL: CPT

## 2019-12-06 PROCEDURE — 84443 ASSAY THYROID STIM HORMONE: CPT

## 2019-12-06 PROCEDURE — 36415 COLL VENOUS BLD VENIPUNCTURE: CPT

## 2019-12-27 ENCOUNTER — HOSPITAL ENCOUNTER (OUTPATIENT)
Dept: PHYSICAL THERAPY | Age: 59
Setting detail: THERAPIES SERIES
Discharge: HOME | End: 2019-12-27

## 2020-05-27 VITALS — HEIGHT: 70 IN | BODY MASS INDEX: 19.33 KG/M2 | WEIGHT: 135 LBS

## 2020-05-27 ASSESSMENT — ENCOUNTER SYMPTOMS
SHORTNESS OF BREATH: 0
CHANGE IN BOWEL HABIT: 0
POOR WOUND HEALING: 0

## 2020-05-27 NOTE — PROGRESS NOTES
 Transportation needs     Medical: Not on file     Non-medical: Not on file   Tobacco Use    Smoking status: Former Smoker     Packs/day: 1.00     Years: 22.00     Pack years: 22.00     Start date: 1972     Last attempt to quit: 1990     Years since quittin.7    Smokeless tobacco: Never Used   Substance and Sexual Activity    Alcohol use: No     Alcohol/week: 0.0 standard drinks     Comment: stopped drinking Set2014    Drug use: Yes     Frequency: 3.0 times per week     Types: Marijuana     Comment: marijuana occasionally    Sexual activity: Not Currently   Lifestyle    Physical activity     Days per week: Not on file     Minutes per session: Not on file    Stress: Not on file   Relationships    Social connections     Talks on phone: Not on file     Gets together: Not on file     Attends [employer] service: Not on file     Active member of club or organization: Not on file     Attends meetings of clubs or organizations: Not on file     Relationship status: Not on file    Intimate partner violence     Fear of current or ex partner: Not on file     Emotionally abused: Not on file     Physically abused: Not on file     Forced sexual activity: Not on file   Other Topics Concern    Not on file   Social History Narrative    Not on file       Review of Systems:  Review of Systems   Constitution: Negative for chills and fever. HENT: Negative for congestion. Eyes: Negative for visual disturbance. Cardiovascular: Positive for chest pain. Respiratory: Negative for shortness of breath. Hematologic/Lymphatic: Bruises/bleeds easily. Skin: Negative for poor wound healing. Musculoskeletal:        Bilateral foot and hand pain   Gastrointestinal: Negative for change in bowel habit. Genitourinary: Negative for bladder incontinence. Neurological: Positive for numbness and tingling. Psychiatric/Behavioral: Positive for depression. Negative for suicidal ideas.  The patient is with their documentation and have added where applicable. Laurel Smith is a 61 y.o. male being evaluated by a Virtual Visit (video visit) encounter to address concerns as mentioned above. A caregiver was present when appropriate. Due to this being a TeleHealth encounter ([facility] emergency), evaluation of the following organ systems was limited: Vitals/Constitutional/EENT/Resp/CV/GI//MS/Neuro/Skin/Heme-Lymph-Imm. Pursuant to the emergency declaration under the [address] [place] [address], [address] [place] [address] authority and the [employer] Act, this Virtual Visit was conducted with patient's (and/or legal guardian's) consent, to reduce the patient's risk of exposure to COVID-19 and provide necessary medical care. The patient (and/or legal guardian) has also been advised to contact this office for worsening conditions or problems, and seek emergency medical treatment and/or call 911 if deemed necessary. Total time spent for this encounter: Not billed by time    Services were provided through a video synchronous discussion virtually to substitute for in-person clinic visit. Patient and provider were located at their individual homes. --Francine De Paz MD on 5/28/2020 at 12:29 PM    An electronic signature was used to authenticate this note.

## 2020-05-28 ENCOUNTER — VIRTUAL VISIT (OUTPATIENT)
Dept: PAIN MANAGEMENT | Age: 60
End: 2020-05-28
Payer: MEDICARE

## 2020-05-28 PROCEDURE — 99203 OFFICE O/P NEW LOW 30 MIN: CPT | Performed by: PAIN MEDICINE

## 2020-09-29 ENCOUNTER — OFFICE VISIT (OUTPATIENT)
Dept: NEUROLOGY | Age: 60
End: 2020-09-29
Payer: MEDICARE

## 2020-09-29 VITALS
BODY MASS INDEX: 19.5 KG/M2 | WEIGHT: 136.2 LBS | HEIGHT: 70 IN | DIASTOLIC BLOOD PRESSURE: 76 MMHG | HEART RATE: 59 BPM | TEMPERATURE: 98.1 F | SYSTOLIC BLOOD PRESSURE: 122 MMHG

## 2020-09-29 PROBLEM — G60.8 IDIOPATHIC SMALL FIBER SENSORY NEUROPATHY: Status: ACTIVE | Noted: 2020-09-29

## 2020-09-29 PROCEDURE — G8427 DOCREV CUR MEDS BY ELIG CLIN: HCPCS | Performed by: NURSE PRACTITIONER

## 2020-09-29 PROCEDURE — 1036F TOBACCO NON-USER: CPT | Performed by: NURSE PRACTITIONER

## 2020-09-29 PROCEDURE — 3017F COLORECTAL CA SCREEN DOC REV: CPT | Performed by: NURSE PRACTITIONER

## 2020-09-29 PROCEDURE — G8420 CALC BMI NORM PARAMETERS: HCPCS | Performed by: NURSE PRACTITIONER

## 2020-09-29 PROCEDURE — 99214 OFFICE O/P EST MOD 30 MIN: CPT | Performed by: NURSE PRACTITIONER

## 2020-09-29 RX ORDER — AMITRIPTYLINE HYDROCHLORIDE 50 MG/1
100 TABLET, FILM COATED ORAL NIGHTLY
Qty: 60 TABLET | Refills: 5 | Status: SHIPPED | OUTPATIENT
Start: 2020-09-29 | End: 2020-10-22

## 2020-09-29 RX ORDER — PYRIDOXINE HCL (VITAMIN B6) 50 MG
50 TABLET ORAL DAILY
Qty: 30 TABLET | Refills: 3 | Status: SHIPPED | OUTPATIENT
Start: 2020-09-29 | End: 2021-04-23
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
## 2020-10-02 ENCOUNTER — TELEPHONE (OUTPATIENT)
Dept: NEUROLOGY | Age: 60
End: 2020-10-02

## 2020-10-22 ENCOUNTER — HOSPITAL ENCOUNTER (OUTPATIENT)
Age: 60
Setting detail: SPECIMEN
Discharge: HOME | End: 2020-10-22
Payer: MEDICARE

## 2020-10-29 ENCOUNTER — HOSPITAL ENCOUNTER (OUTPATIENT)
Dept: NUCLEAR MEDICINE | Age: 60
Discharge: HOME | End: 2020-10-31
Payer: MEDICARE

## 2020-10-29 VITALS — WEIGHT: 139 LBS | HEIGHT: 69 IN | BODY MASS INDEX: 20.59 KG/M2

## 2020-10-29 PROCEDURE — A9503 TC99M MEDRONATE: HCPCS | Performed by: NURSE PRACTITIONER

## 2020-10-29 PROCEDURE — 2580000003 HC RX 258: Performed by: NURSE PRACTITIONER

## 2020-10-29 PROCEDURE — 3430000000 HC RX DIAGNOSTIC RADIOPHARMACEUTICAL: Performed by: NURSE PRACTITIONER

## 2020-10-29 PROCEDURE — 78306 BONE IMAGING WHOLE BODY: CPT

## 2020-10-29 RX ORDER — TC 99M MEDRONATE 20 MG/10ML
25 INJECTION, POWDER, LYOPHILIZED, FOR SOLUTION INTRAVENOUS
Status: COMPLETED | OUTPATIENT
Start: 2020-10-29 | End: 2020-10-29

## 2020-10-29 RX ORDER — SODIUM CHLORIDE 0.9 % (FLUSH) 0.9 %
10 SYRINGE (ML) INJECTION PRN
Status: DISCONTINUED | OUTPATIENT
Start: 2020-10-29 | End: 2020-11-01 | Stop reason: HOSPADM

## 2020-10-29 RX ADMIN — TC 99M MEDRONATE 29.9 MILLICURIE: 20 INJECTION, POWDER, LYOPHILIZED, FOR SOLUTION INTRAVENOUS at 07:05

## 2020-10-29 RX ADMIN — Medication 10 ML: at 07:05

## 2020-11-06 ENCOUNTER — TELEPHONE (OUTPATIENT)
Dept: VASCULAR SURGERY | Age: 60
End: 2020-11-06

## 2020-11-06 NOTE — TELEPHONE ENCOUNTER
Patient was referred to our office for iliac artery aneurysm and PAD. Orders generated for testing. Scheduled consult with the patient and transferred him to central scheduling to schedule CTA Abdominal Aorta with B/L Runoff and PVR.

## 2020-11-14 ENCOUNTER — HOSPITAL ENCOUNTER (OUTPATIENT)
Dept: MRI IMAGING | Age: 60
Discharge: HOME | End: 2020-11-16
Payer: MEDICARE

## 2020-11-14 PROCEDURE — 72141 MRI NECK SPINE W/O DYE: CPT

## 2020-11-23 ENCOUNTER — HOSPITAL ENCOUNTER (OUTPATIENT)
Dept: PHYSICAL THERAPY | Age: 60
Setting detail: THERAPIES SERIES
Discharge: HOME | End: 2020-11-23
Payer: MEDICARE

## 2020-11-23 PROCEDURE — 97161 PT EVAL LOW COMPLEX 20 MIN: CPT

## 2020-11-23 ASSESSMENT — PAIN DESCRIPTION - DESCRIPTORS: DESCRIPTORS: CONSTANT

## 2020-11-23 ASSESSMENT — PAIN DESCRIPTION - FREQUENCY: FREQUENCY: CONTINUOUS

## 2020-11-23 ASSESSMENT — PAIN DESCRIPTION - ORIENTATION: ORIENTATION: LOWER

## 2020-11-23 ASSESSMENT — PAIN DESCRIPTION - LOCATION: LOCATION: NECK;BACK

## 2020-11-23 ASSESSMENT — PAIN SCALES - GENERAL: PAINLEVEL_OUTOF10: 7

## 2020-11-23 NOTE — PROGRESS NOTES
Physical Therapy  Initial Assessment  Date: 2020  Patient Name: Antonette Vuong  MRN: [id number]  : 1960     Treatment Diagnosis: weakness RLE M62.551 difficulty walking R26.2 NECx    Restrictions  Restrictions/Precautions  Restrictions/Precautions: Fall Risk  Implants present? : Metal implants  Position Activity Restriction  Other position/activity restrictions: tavia on thoracic area,LTKR    Subjective   General  Chart Reviewed: Yes  Patient assessed for rehabilitation services?: Yes  Additional Pertinent Hx: neck pain for the last 2 years,back pain since   Family / Caregiver Present: No  Referring Practitioner: Dianna Benson  Referral Date : 20  Diagnosis: cervical stenosis M48.02 thoracic DDD M51.34 chronic low back pain with B sciatica M54.42  Follows Commands: Within Functional Limits  PT Visit Information  Onset Date: 89  PT Insurance Information: [place]  Total # of Visits Approved: 12  Total # of Visits to Date: 1  Subjective  Subjective: complains of neck and back pain aggravated by cold weather  Pain Screening  Patient Currently in Pain: Yes  Pain Assessment  Pain Assessment: 0-10  Pain Level: 7  Pain Location: Neck;Back  Pain Orientation: Lower  Pain Descriptors: Constant  Pain Frequency: Continuous  Vital Signs  Patient Currently in Pain: Yes    Vision/Hearing  Vision  Vision: Impaired(wear glasses)  Hearing  Hearing: Within functional limits    Orientation  Orientation  Overall Orientation Status: Within Normal Limits    Social/Functional History  Social/Functional History  Lives With: Family  Type of Home: House  Home Layout: Two level; Able to Live on Main level with bedroom/bathroom  Home Access: Stairs to enter without rails  Entrance Stairs - Number of Steps: 1  ADL Assistance: Independent  Homemaking Assistance: Independent  Ambulation Assistance: Independent  Transfer Assistance: Independent  Active : Yes  Mode of Transportation: Car  Occupation: On disability    Objective  AROM RLE (degrees)  RLE AROM: WFL  AROM LLE (degrees)  LLE AROM : WFL  AROM RUE (degrees)  RUE AROM : WFL  AROM LUE (degrees)  LUE AROM : WFL  Spine  Cervical: AROM CERVICAL FLEX 60 EXT 30 SBB 30 ROTB 60  Lumbar: AROM TRUNK FLEX 60 EXT 20 ROTB FREE SBB 30  Strength RLE  Comment: hip 2/5 knee 4-/5 ankle 2/5  Strength LLE  Strength LLE: WNL  Strength RUE  Strength RUE: WNL  Strength LUE  Strength LUE: WNL  Bed mobility  Supine to Sit: Independent  Sit to Supine: Independent  Transfers  Sit to Stand: Independent  Stand to sit: Independent  Bed to Chair: Independent  Stand Pivot Transfers: Independent  Ambulation  Ambulation?: Yes  Ambulation 1  Surface: level tile  Device: No Device  Assistance: Contact guard assistance  Gait Deviations: Slow Brenda;Decreased step length;Decreased step height  Comments: TUG 16 secs  Stairs/Curb  Stairs?: No     Exercises  Exercise 1: [facility] 10x10\"    Assessment   Conditions Requiring Skilled Therapeutic Intervention  Body structures, Functions, Activity limitations: Decreased functional mobility ; Decreased ADL status; Decreased ROM; Decreased strength; Increased pain  Assessment: cervical and back pain limiting function  Treatment Diagnosis: weakness RLE M62.551 difficulty walking R26.2 NECx  Prognosis: Good  Decision Making: Low Complexity  History: back pain since 1989 neck pain for the last 2 years  Exam: limited cervical and trunk ROM weak RLE  Clinical Presentation: TUG 16 secs neck disability score 50% oswestry score 64%  Barriers to Learning: none  REQUIRES PT FOLLOW UP: Yes  Discharge Recommendations: Home independently  Activity Tolerance  Activity Tolerance: Patient Tolerated treatment well         Plan   Plan  Times per week: 2x/week  Plan weeks: 6 weeks  Specific instructions for Next Treatment: Aquatic PT Fibromyalgia protocol  Current Treatment Recommendations: Home Exercise Program, Strengthening, ROM, Aquatics  Plan Comment: instructed in HEP    OutComes Score  Neck Disability Index Raw Score: 25 (11/23/20 1025)  Oswestry CMS Modifier: CL (11/23/20 1025)  Oswestry Disability Scores %: 64.44 (11/23/20 1025)    Goals  Short term goals  Time Frame for Short term goals: 6 visits  Short term goal 1: decrease neck and back pain by 50% so patient can sleep better at night  Short term goal 2: increase AROM cervical and Trunk to full  Short term goal 3: increase strength RLE by 1/2 grade or better  Short term goal 4: indep with HEP  Long term goals  Time Frame for Long term goals : 12 visits  Long term goal 1: improve TUG from 16 secs to 10 secs to prevent falls  Long term goal 2: improve neck disability score from 50 to 40% or better  Long term goal 3: improve oswestry score from 64 to 54% or better  Patient Goals   Patient goals : relieve pain     Treatment Charges: Minutes Units   []  Ultrasound     []  Electrical-Stim     []  Iontophoresis     []  Traction     []  Massage       [x]  Eval 20 1   []  Gait     []  Ther Exercise       []  Manual Therapy       []  Ther Activities       []  Aquatics     []  Vasopneumatic Device     []  Neuro Re-Ed       []  Other       Total Treatment Time: 20 1        Therapy Time   Individual Concurrent Group Co-treatment   Time In 1025         Time Out 1045         Minutes 20               Patient Goals:relieve pain    Comments/Assessment:    Rehab Potential:  [x] Good  [] Fair  [] Poor   Suggested Professional Referral:  [x] No  [] Yes:  Barriers to Goal Achievement:  [] No  [x] Yes:chronic  Domestic Concerns:  [x] No  [] Yes:    Treatment Plan:  [x] Therapeutic Exercise   89737  [] Iontophoresis: 4 mg/mL Dexamethasone Sodium Phosphate  mAmin  68507   [] Therapeutic Activity  84475 [] Vasopneumatic cold with compression  62883    [] Gait Training   57597 [] Ultrasound   [id number]   [] Neuromuscular Re-education  28937 [] Electrical Stimulation Unattended  97897   [] Manual Therapy  48453 [] Electrical Stimulation Attended  [id number]   [x] Instruction in HEP  [] Lumbar/Cervical Traction  [id number]   [x] Aquatic Therapy   [id number] [] Cold/hotpack    [] Massage   67656      [] Dry Needling, 1 or 2 muscles  35255   [] Biofeedback, first 15 minutes   02033  [] Biofeedback, additional 15 minutes   82929 [] Dry Needling, 3 or more muscles  52227      Frequency:          2/wk x        6  wk's      [x] Plans/Goals, Risk/Benefits discussed with pt/family  Comprehension of Education [x] yes  [] Needs Review  Pt/Family Education: [x] Verbal  [x] Demo  [] Written    More objective information is available upon request.  Thank you for this referral.        Medicare/Regulatory Requirements:  I have reviewed this plan of care and certify a need for   Medically necessary rehabilitation services.   [] Physician Signature    Date:     Electronically signed by: Maureen Black, [address] @ [address], [address] [facility]  Phone [phone]  Fax [phone]

## 2020-12-01 ENCOUNTER — VIRTUAL VISIT (OUTPATIENT)
Dept: NEUROLOGY | Age: 60
End: 2020-12-01
Payer: MEDICARE

## 2020-12-01 PROCEDURE — 99213 OFFICE O/P EST LOW 20 MIN: CPT | Performed by: NURSE PRACTITIONER
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
## 2020-12-02 ENCOUNTER — HOSPITAL ENCOUNTER (OUTPATIENT)
Dept: PHYSICAL THERAPY | Age: 60
Setting detail: THERAPIES SERIES
Discharge: HOME | End: 2020-12-02
Payer: MEDICARE

## 2020-12-02 NOTE — FLOWSHEET NOTE
[address]   Outpatient Physical Therapy  Cancel/No Show Note    Date: 20    Patient Name: Alice Suarez        MRN: [id number]  Account: [de-identified] : 1960      General Information:  Referring Practitioner: Ronnie Lozano  Referral Date : 20  Diagnosis: cervical stenosis M48.02 thoracic DDD M51.34 chronic low back pain with B sciatica M54.42  Onset Date: 89  PT Insurance Information: aetna medicare/medicaid  Total # of Visits Approved: 12  Total # of Visits to Date: 1  No Show: 1  Canceled Appointment: 1        Comments: Pt cancelled appointment today and confirmed next weeks appointments.     Karen Silverio, PTA

## 2020-12-04 NOTE — TELEPHONE ENCOUNTER
Patient no showed to testing and he states he didn't know about it. I transferred him to central scheduling again to get the testing rescheduled prior to his appt on 12/14. I explained that without the testing, we would need to reschedule his appt. He verbalized understanding.

## 2020-12-07 ENCOUNTER — HOSPITAL ENCOUNTER (OUTPATIENT)
Dept: PHYSICAL THERAPY | Age: 60
Setting detail: THERAPIES SERIES
Discharge: HOME | End: 2020-12-07
Payer: MEDICARE

## 2020-12-07 PROCEDURE — 97113 AQUATIC THERAPY/EXERCISES: CPT

## 2020-12-07 ASSESSMENT — PAIN DESCRIPTION - ORIENTATION: ORIENTATION: LOWER;POSTERIOR

## 2020-12-07 ASSESSMENT — PAIN DESCRIPTION - DESCRIPTORS: DESCRIPTORS: CONSTANT;ACHING

## 2020-12-07 ASSESSMENT — PAIN DESCRIPTION - LOCATION: LOCATION: NECK;BACK

## 2020-12-07 ASSESSMENT — PAIN SCALES - GENERAL: PAINLEVEL_OUTOF10: 7

## 2020-12-07 ASSESSMENT — PAIN DESCRIPTION - FREQUENCY: FREQUENCY: CONTINUOUS

## 2020-12-07 NOTE — FLOWSHEET NOTE
ROM w/ kickboard 3-ways  3x10\"       Iso Abd. Push-pull 10x       Paddling        Breast-stroke 2 Laps       Rope pull                UE Stretches        Corner stretch        Post. Capsule stretch 2x20\"       LE Stretches        Hamstring        Achilles        Quad        C-Spine Stretches        Upper trap        Chin tucks        Cerv ROM        Deep H2O        Cycling        Jacks        Cross-country        Hang                                                        Pain Rating 7         Comment:  Comments: Initial aquautic Therapy viit.  educated in postural awareness and core stability throughout program.    Assessment: Body structures, Functions, Activity limitations: Decreased functional mobility ; Decreased ADL status; Decreased ROM; Decreased strength; Increased pain  Assessment: intiated aquatic therapy to help  Treatment Diagnosis: weakness RLE M62.551 difficulty walking R26.2 NECx  Prognosis: Good  REQUIRES PT FOLLOW UP: Yes  Activity Tolerance: Patient Tolerated treatment well    Plan:  Plan: Continue with current plan    Therapy Time:  Time In: 1025  Time Out: 1100  Minutes: 35  Timed Code Treatment Minutes: 30 Minutes    Treatment Charges: Minutes Units   []  Ultrasound     []  Electrical-Stim     []  Iontophoresis     []  Traction     []  Massage       []  Eval     []  Gait     []  Vasopneumatic Device     []  Ther Exercise       []  Manual Therapy       []  Ther Activities       [x]  Aquatics 30 2   []  Neuro Re-Ed       []  Other       Total Treatment Time: 30  2     Constantino Stewart PTA

## 2020-12-08 ENCOUNTER — HOSPITAL ENCOUNTER (OUTPATIENT)
Dept: CT IMAGING | Age: 60
Discharge: HOME | End: 2020-12-10
Payer: MEDICARE

## 2020-12-08 ENCOUNTER — HOSPITAL ENCOUNTER (OUTPATIENT)
Dept: VASCULAR LAB | Age: 60
Discharge: HOME | End: 2020-12-08
Payer: MEDICARE

## 2020-12-08 LAB
BUN BLDV-MCNC: 14 MG/DL (ref 8–23)
CREAT SERPL-MCNC: 0.79 MG/DL (ref 0.7–1.2)
GFR AFRICAN AMERICAN: >60 ML/MIN
GFR NON-AFRICAN AMERICAN: >60 ML/MIN
GFR SERPL CREATININE-BSD FRML MDRD: NORMAL ML/MIN/{1.73_M2}
GFR SERPL CREATININE-BSD FRML MDRD: NORMAL ML/MIN/{1.73_M2}

## 2020-12-08 PROCEDURE — 2580000003 HC RX 258: Performed by: SURGERY

## 2020-12-08 PROCEDURE — 75635 CT ANGIO ABDOMINAL ARTERIES: CPT

## 2020-12-08 PROCEDURE — 36415 COLL VENOUS BLD VENIPUNCTURE: CPT

## 2020-12-08 PROCEDURE — 93923 UPR/LXTR ART STDY 3+ LVLS: CPT

## 2020-12-08 PROCEDURE — 84520 ASSAY OF UREA NITROGEN: CPT

## 2020-12-08 PROCEDURE — 82565 ASSAY OF CREATININE: CPT

## 2020-12-08 PROCEDURE — 6360000004 HC RX CONTRAST MEDICATION: Performed by: SURGERY

## 2020-12-08 RX ORDER — 0.9 % SODIUM CHLORIDE 0.9 %
80 INTRAVENOUS SOLUTION INTRAVENOUS ONCE
Status: COMPLETED | OUTPATIENT
Start: 2020-12-08 | End: 2020-12-08

## 2020-12-08 RX ORDER — SODIUM CHLORIDE 0.9 % (FLUSH) 0.9 %
10 SYRINGE (ML) INJECTION PRN
Status: DISCONTINUED | OUTPATIENT
Start: 2020-12-08 | End: 2020-12-11 | Stop reason: HOSPADM

## 2020-12-08 RX ADMIN — IOPAMIDOL 100 ML: 755 INJECTION, SOLUTION INTRAVENOUS at 13:36

## 2020-12-08 RX ADMIN — SODIUM CHLORIDE 80 ML: 9 INJECTION, SOLUTION INTRAVENOUS at 13:36

## 2020-12-08 RX ADMIN — Medication 10 ML: at 13:36

## 2020-12-09 ENCOUNTER — HOSPITAL ENCOUNTER (OUTPATIENT)
Dept: PHYSICAL THERAPY | Age: 60
Setting detail: THERAPIES SERIES
Discharge: HOME | End: 2020-12-09
Payer: MEDICARE

## 2020-12-14 ENCOUNTER — INITIAL CONSULT (OUTPATIENT)
Dept: VASCULAR SURGERY | Age: 60
End: 2020-12-14
Payer: MEDICARE

## 2020-12-14 VITALS
HEIGHT: 69 IN | RESPIRATION RATE: 18 BRPM | SYSTOLIC BLOOD PRESSURE: 134 MMHG | BODY MASS INDEX: 21.03 KG/M2 | OXYGEN SATURATION: 100 % | DIASTOLIC BLOOD PRESSURE: 80 MMHG | TEMPERATURE: 97.1 F | HEART RATE: 98 BPM | WEIGHT: 142 LBS

## 2020-12-14 PROCEDURE — 99204 OFFICE O/P NEW MOD 45 MIN: CPT | Performed by: SURGERY

## 2020-12-14 RX ORDER — GABAPENTIN 800 MG/1
800 TABLET ORAL 4 TIMES DAILY
COMMUNITY
End: 2021-01-25 | Stop reason: SDUPTHER

## 2020-12-14 ASSESSMENT — ENCOUNTER SYMPTOMS
ABDOMINAL PAIN: 0
COUGH: 1
SHORTNESS OF BREATH: 1
CHEST TIGHTNESS: 0
BACK PAIN: 1
ALLERGIC/IMMUNOLOGIC NEGATIVE: 1
COLOR CHANGE: 0
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
## 2020-12-15 ENCOUNTER — HOSPITAL ENCOUNTER (OUTPATIENT)
Dept: PHYSICAL THERAPY | Age: 60
Setting detail: THERAPIES SERIES
Discharge: HOME | End: 2020-12-15
Payer: MEDICARE

## 2020-12-15 NOTE — FLOWSHEET NOTE
[address]   Outpatient Physical Therapy  Cancel/No Show Note    Date: 12/15/20    Patient Name: Asya Newell        MRN: [id number]  Account: [de-identified] : 1960      General Information:  Referring Practitioner: Ginette Lipscomb  Referral Date : 20  Diagnosis: cervical stenosis M48.02 thoracic DDD M51.34 chronic low back pain with B sciatica M54.42  Onset Date: 89  PT Insurance Information: aetna medicare/medicaid  Total # of Visits Approved: 12  Total # of Visits to Date: 2  No Show: 1  Canceled Appointment: 3        Comments: Pt cancelled today.   will be here thursday    Millie Dandy, PTA

## 2020-12-17 ENCOUNTER — HOSPITAL ENCOUNTER (OUTPATIENT)
Dept: PHYSICAL THERAPY | Age: 60
Setting detail: THERAPIES SERIES
Discharge: HOME | End: 2020-12-17
Payer: MEDICARE

## 2020-12-17 NOTE — FLOWSHEET NOTE
[address] [facility]   Outpatient Physical Therapy  Cancel/No Show Note    Date: 20    Patient Name: Yohana Paulino        MRN: [id number]  Account: [de-identified] : 1960      General Information:  Referring Practitioner: Susi De Luna  Referral Date : 20  Diagnosis: cervical stenosis M48.02 thoracic DDD M51.34 chronic low back pain with B sciatica M54.42  Onset Date: 89  PT Insurance Information: aetna medicare/medicaid  Total # of Visits Approved: 12  Total # of Visits to Date: 2  No Show: 1  Canceled Appointment: 4        Comments: Pt cancelled appointment today. not feeling well.     Que Mcadams, PTA

## 2020-12-22 ENCOUNTER — HOSPITAL ENCOUNTER (OUTPATIENT)
Dept: PHYSICAL THERAPY | Age: 60
Setting detail: THERAPIES SERIES
Discharge: HOME | End: 2020-12-22
Payer: MEDICARE

## 2020-12-22 PROCEDURE — 97113 AQUATIC THERAPY/EXERCISES: CPT

## 2020-12-22 ASSESSMENT — PAIN DESCRIPTION - ORIENTATION: ORIENTATION: LOWER;POSTERIOR

## 2020-12-22 ASSESSMENT — PAIN DESCRIPTION - LOCATION: LOCATION: NECK;BACK

## 2020-12-22 ASSESSMENT — PAIN DESCRIPTION - FREQUENCY: FREQUENCY: CONTINUOUS

## 2020-12-22 ASSESSMENT — PAIN SCALES - GENERAL: PAINLEVEL_OUTOF10: 7

## 2020-12-22 ASSESSMENT — PAIN DESCRIPTION - DESCRIPTORS: DESCRIPTORS: CONSTANT;ACHING

## 2020-12-22 NOTE — FLOWSHEET NOTE
509 [facility]   Outpatient Physical Therapy  [address] [place] [address]. [address]  Phone: [phone]  Fax: [phone]  Daily Progress Note    Date: 20    Patient Name: Silvio Welch        MRN: [id number]  Account: [de-identified] : 1960      General Information:  Chart Reviewed: Yes  Patient assessed for rehabilitation services?: Yes  Additional Pertinent Hx: neck pain for the last 2 years,back pain since   Referring Practitioner: Sandra Chris  Referral Date : 20  Diagnosis: cervical stenosis M48.02 thoracic DDD M51.34 chronic low back pain with B sciatica M54.42  Follows Commands: Within Functional Limits  Onset Date: 89  PT Insurance Information: aetna medicare/medicaid  Total # of Visits Approved: 12  Total # of Visits to Date: 3  No Show: 1  Canceled Appointment: 4    Subjective:  Subjective: Pt reports pain in right arm and knee yesterday for no reason. notes pain is up and down all week. state he has been working on getting snow mobiles ready for next week to go and pain is higher after working on them. Pain:  Patient Currently in Pain: Yes  Pain Assessment: 0-10  Pain Level: 7  Pain Location: Neck;Back  Pain Orientation: Lower; Posterior  Pain Descriptors: Constant; Aching  Pain Frequency: Continuous       Objective:    [address] Services Exercise Log  Protocol Fibromyalgia     Date of Eval:                                Primary PT:  Diagnosis: Things to Focus On (goals):   Surgical Precautions:  Medical Precautions:  []? C-9 dates  []? Occ Med   []?  Medicare         Date 20         Visit # /12  3/12         Walk F/L/R 2 Laps  2 Laps         LE Exercise             Marching 10x 10x         Squats 10x 10x         Step-Ups F/L             Heel-toe raises 10x 10x         SLR F/L/R 10x 10x         HS Curl            Lunges            Knee flex/ext    10x         UE exercises             Shoulder Rolls             Shoulder shrugs             Horiz abd/add 10x 10x         Fwd flex 10x 10x         Abd/add 10x 10x         PNF    10x         Bicep Curls 10x 10x         IR/ER 10x 10x         Wall Push-Ups                           Kickboard Ex. Sm Sm         ROM w/ kickboard 3-ways  3x10\" 3-ways  3x10\"         Iso Abd.             Push-pull 10x 10x         Paddling    10x         Breast-stroke 2 Laps 2 Laps         Rope pull                           UE Stretches             Corner stretch             Post. Capsule stretch 2x20\" 2x20\"         LE Stretches             Hamstring             Achilles             Quad             C-Spine Stretches             Upper trap             Chin tucks             Cerv ROM             Deep H2O             Cycling             Jacks             Cross-country             Hang                                                                                                 Pain Rating 7 7              Assessment: Body structures, Functions, Activity limitations: Decreased functional mobility ; Decreased ADL status; Decreased ROM; Decreased strength; Increased pain  Treatment Diagnosis: weakness RLE M62.551 difficulty walking R26.2 NECx  Prognosis: Good  REQUIRES PT FOLLOW UP: Yes  Activity Tolerance: Patient Tolerated treatment well  Comments: added PNF pattern and paddling with KB and knee ext/flex exercise to patient's tolerance.     Plan:  Plan: Continue with current plan    Therapy Time:  Time In: 1025  Time Out: 1053  Minutes: 28  Timed Code Treatment Minutes: 25 Minutes    Treatment Charges: Minutes Units   []  Ultrasound     []  Electrical-Stim     []  Iontophoresis     []  Traction     []  Massage       []  Eval     []  Gait     []  Vasopneumatic Device     []  Ther Exercise       []  Manual Therapy       []  Ther Activities       [x]  Aquatics 25 2   []  Neuro Re-Ed       []  Other       Total Treatment Time: 25  2     Helen Borer, PTA

## 2020-12-29 ENCOUNTER — HOSPITAL ENCOUNTER (OUTPATIENT)
Dept: PHYSICAL THERAPY | Age: 60
Setting detail: THERAPIES SERIES
Discharge: HOME | End: 2020-12-29
Payer: MEDICARE

## 2020-12-29 NOTE — FLOWSHEET NOTE
[address]   Outpatient Physical Therapy  Cancel/No Show Note    Date: 20    Patient Name: Kindra Curiel        MRN: [id number]  Account: [de-identified] : 1960      General Information:  Referring Practitioner: Flavio Marina  Referral Date : 20  Diagnosis: cervical stenosis M48.02 thoracic DDD M51.34 chronic low back pain with B sciatica M54.42  Onset Date: 89  PT Insurance Information: aetna medicare/medicaid  Total # of Visits Approved: 12  Total # of Visits to Date: 3  No Show: 1  Canceled Appointment: 5        Comments: Pt cancelled appointment today due to not being able to make it.   \"Has stuff going on\"    Terrie Luther, PTA

## 2020-12-30 ENCOUNTER — HOSPITAL ENCOUNTER (OUTPATIENT)
Dept: PHYSICAL THERAPY | Age: 60
Setting detail: THERAPIES SERIES
Discharge: HOME | End: 2020-12-30
Payer: MEDICARE

## 2020-12-30 NOTE — FLOWSHEET NOTE
[address]   Outpatient Physical Therapy  Cancel/No Show Note    Date: 20    Patient Name: Yoko Knowles        MRN: [id number]  Account: [de-identified] : 1960      General Information:  Chart Reviewed: Yes  Patient assessed for rehabilitation services?: Yes  Additional Pertinent Hx: neck pain for the last 2 years,back pain since   Referring Practitioner: Dustin Malloy  Referral Date : 20  Diagnosis: cervical stenosis M48.02 thoracic DDD M51.34 chronic low back pain with B sciatica M54.42  Follows Commands: Within Functional Limits  Onset Date: 89  PT Insurance Information: aetna medicare/medicaid  Total # of Visits Approved: 12  Total # of Visits to Date: 3  No Show: 1  Canceled Appointment: 6        Comments: PT CANCELLED APPOINTMENT TODAY DUE TO TOO MUCH PAIN. RESCHEDULED FOR NEXT WEEK.     Lino Ellis, PTA

## 2021-01-05 ENCOUNTER — HOSPITAL ENCOUNTER (OUTPATIENT)
Dept: PHYSICAL THERAPY | Age: 61
Setting detail: THERAPIES SERIES
Discharge: HOME | End: 2021-01-05
Payer: MEDICARE

## 2021-01-05 NOTE — FLOWSHEET NOTE
[address]   Outpatient Physical Therapy  Cancel/No Show Note    Date: 21    Patient Name: Jeanette Servin        MRN: [id number]  Account: [de-identified] : 1960      General Information:  Referring Practitioner: Luisa Arriaga  Referral Date : 20  Diagnosis: cervical stenosis M48.02 thoracic DDD M51.34 chronic low back pain with B sciatica M54.42  Onset Date: 89  PT Insurance Information: aetna medicare/medicaid  Total # of Visits Approved: 12  Total # of Visits to Date: 3  No Show: 1  Canceled Appointment: 7        Comments: Pt cancelled appointment today due to unable to make it.     Boone Mann, PTA

## 2021-01-08 ENCOUNTER — HOSPITAL ENCOUNTER (OUTPATIENT)
Dept: PHYSICAL THERAPY | Age: 61
Setting detail: THERAPIES SERIES
Discharge: HOME | End: 2021-01-08
Payer: MEDICARE

## 2021-01-12 ENCOUNTER — HOSPITAL ENCOUNTER (OUTPATIENT)
Dept: PHYSICAL THERAPY | Age: 61
Setting detail: THERAPIES SERIES
Discharge: HOME | End: 2021-01-12
Payer: MEDICARE

## 2021-01-14 ENCOUNTER — HOSPITAL ENCOUNTER (OUTPATIENT)
Dept: PHYSICAL THERAPY | Age: 61
Setting detail: THERAPIES SERIES
Discharge: HOME | End: 2021-01-14
Payer: MEDICARE

## 2021-01-14 NOTE — DISCHARGE SUMMARY
[] [facility]) @ [facility]  [address] [facility] [address] , [address] [facility] [facility]  Phone [phone]  Fax [phone]    Physical Therapy Discharge Note    Date: 2021      Patient: Jessica Seay  : 1960  MRN: [id number]    Physician: Cheyanne Gerardo     Diagnosis: M48.02 thoracic DDD M51.34 chronic low back pain with B sciatica M54.42 Onset Date: 89    Rehab Diagnosis/ICD-10 Codes: weakness RLE M62.551 difficulty walking R26.2 NECx  Total visits attended: 3/12  Cancels/No shows: 10/1  Date of initial visit: 2020                    [] Patient recovered from conditions. Treatment goals were met. [] Patient received maximum benefit. No further therapy indicated at this time. [x] Patient demonstrated improvement from condition with  0 Of  4Short term goals met. [x]Patient demonstrated improvement from condition with 0  Of 3  Long term goals met. [] Patient to continue exercise/home instructions independently. [] Therapy interrupted due to:    [x] Patient has 2 or more no shows/cancels, is discontinued per our policy. [] Patient has completed prescribed number of treatment sessions. [] Other:      Pain level at evaluation was 7/10     It Is My Understanding That The:  [] Patient returned to work. [] Patient demonstrated improved level of function. [] Patient returned to previous functional level. [x] Patient's current functional status is unknown due to no-shows  [] Other:     Recommendations/Comments:    Pt has cancelled the last six continuous therapy sessions and was verbally informed multiple times of our cancellation policy. Pt was aware if he cancelled today' therapy session he would be discharged per out attendance policy.      Treatment Included:     [x] Therapeutic Exercise   24451  [] Iontophoresis: 4 mg/mL Dexamethasone Sodium Phosphate  mAmin  92381   [] Therapeutic Activity  88312 [] Vasopneumatic cold with

## 2021-01-14 NOTE — FLOWSHEET NOTE
[address] [facility]   Outpatient Physical Therapy  Cancel/No Show Note    Date: 21    Patient Name: Jeanette Servin        MRN: [id number]  Account: [de-identified] : 1960      General Information:  Referring Practitioner: Luisa Arriaga  Referral Date : 20  Diagnosis: cervical stenosis M48.02 thoracic DDD M51.34 chronic low back pain with B sciatica M54.42  Onset Date: 89  PT Insurance Information: aetna medicare/medicaid  Total # of Visits Approved: 12  Total # of Visits to Date: 3  No Show: 1  Canceled Appointment: 10        Comments: Pt cancelled today.   JESSICA Mann, PTA

## 2021-08-16 ENCOUNTER — HOSPITAL ENCOUNTER (EMERGENCY)
Age: 61
Discharge: HOME | End: 2021-08-16
Attending: EMERGENCY MEDICINE
Payer: MEDICARE

## 2021-08-16 ENCOUNTER — APPOINTMENT (OUTPATIENT)
Dept: CT IMAGING | Age: 61
End: 2021-08-16
Payer: MEDICARE

## 2021-08-16 VITALS
TEMPERATURE: 98.3 F | HEART RATE: 89 BPM | RESPIRATION RATE: 16 BRPM | DIASTOLIC BLOOD PRESSURE: 112 MMHG | HEIGHT: 69 IN | OXYGEN SATURATION: 96 % | SYSTOLIC BLOOD PRESSURE: 131 MMHG | WEIGHT: 130 LBS | BODY MASS INDEX: 19.26 KG/M2

## 2021-08-16 DIAGNOSIS — M54.81 OCCIPITAL NEURALGIA OF RIGHT SIDE: Primary | ICD-10-CM

## 2021-08-16 PROCEDURE — 6370000000 HC RX 637 (ALT 250 FOR IP): Performed by: EMERGENCY MEDICINE

## 2021-08-16 PROCEDURE — 70450 CT HEAD/BRAIN W/O DYE: CPT

## 2021-08-16 PROCEDURE — 99285 EMERGENCY DEPT VISIT HI MDM: CPT

## 2021-08-16 RX ORDER — OXYCODONE HYDROCHLORIDE AND ACETAMINOPHEN 5; 325 MG/1; MG/1
2 TABLET ORAL ONCE
Status: COMPLETED | OUTPATIENT
Start: 2021-08-16 | End: 2021-08-16

## 2021-08-16 RX ADMIN — OXYCODONE HYDROCHLORIDE AND ACETAMINOPHEN 2 TABLET: 5; 325 TABLET ORAL at 09:43

## 2021-08-16 ASSESSMENT — PAIN - FUNCTIONAL ASSESSMENT
PAIN_FUNCTIONAL_ASSESSMENT: ACTIVITIES ARE NOT PREVENTED
PAIN_FUNCTIONAL_ASSESSMENT: PREVENTS OR INTERFERES SOME ACTIVE ACTIVITIES AND ADLS

## 2021-08-16 ASSESSMENT — PAIN SCALES - GENERAL
PAINLEVEL_OUTOF10: 8
PAINLEVEL_OUTOF10: 8
PAINLEVEL_OUTOF10: 3

## 2021-08-16 ASSESSMENT — PAIN DESCRIPTION - DESCRIPTORS
DESCRIPTORS: THROBBING
DESCRIPTORS: THROBBING;CONSTANT

## 2021-08-16 ASSESSMENT — PAIN DESCRIPTION - FREQUENCY
FREQUENCY: CONTINUOUS
FREQUENCY: CONTINUOUS

## 2021-08-16 ASSESSMENT — PAIN DESCRIPTION - PROGRESSION
CLINICAL_PROGRESSION: NOT CHANGED
CLINICAL_PROGRESSION: GRADUALLY IMPROVING

## 2021-08-16 ASSESSMENT — PAIN DESCRIPTION - ORIENTATION: ORIENTATION: POSTERIOR;LEFT

## 2021-08-16 ASSESSMENT — PAIN DESCRIPTION - PAIN TYPE: TYPE: ACUTE PAIN

## 2021-08-16 ASSESSMENT — PAIN DESCRIPTION - ONSET
ONSET: ON-GOING
ONSET: ON-GOING

## 2021-08-16 ASSESSMENT — PAIN DESCRIPTION - LOCATION
LOCATION: HEAD
LOCATION: HEAD

## 2021-08-16 NOTE — ED TRIAGE NOTES
Mode of arrival (squad #, walk in, police, etc) : walk in         Chief complaint(s): HA        Arrival Note (brief scenario, treatment PTA, etc). : patient arrived to ED from home with C/O HA. Patient reports onset this morning around 0430. Patient states he was rolling over in bed, when he touched his head began to throb. Patient reports pain a 8/10 on a pain scale, described as constant throbbing, patient reports some blurred vision, denies any sensitivity to light. Patient also reports some right sided neck pain, denies any recent fall where he hit his head. Denies any numbness or tingling in his UE or LE. Patient denies any N/V/D, fevers, chills, or abd pain. Reported to have received his covid vaccine back in April. C= \"Have you ever felt that you should Cut down on your drinking? \"  No  A= \"Have people Annoyed you by criticizing your drinking? \"  No  G= \"Have you ever felt bad or Guilty about your drinking? \"  No  E= \"Have you ever had a drink as an Eye-opener first thing in the morning to steady your nerves or to help a hangover? \"  No      Deferred []      Reason for deferring: N/A    *If yes to two or more: probable alcohol abuse. *

## 2021-08-16 NOTE — ED PROVIDER NOTES
[address]  EMERGENCY DEPARTMENT ENCOUNTER      Pt Name: Aman Soto  MRN: [id number]  [place] 1960  Date of evaluation: 8/16/21      CHIEF COMPLAINT:   Chief Complaint   Patient presents with    Headache     HISTORY OF PRESENT ILLNESS    Aman Soto is a 64 y.o. male who presents with headache that started around 4am this morning. The headache is located right occiptial area. The pain is described as throbbing in character. The pain is rated as 8/10 in severity. Pain has been coming and going. Worsened by turning his head. It has been gradually worsening and was not \"thunder clap\" in origin at all. There has been no syncope associated either. No trauma. REVIEW OF SYSTEMS     Negative in 10 essential Systems except as mentioned above and in the HPI. PAST MEDICAL HISTORY   PMH:  has a past medical history of Abdominal pain, Ascending aortic aneurysm (HCC), Chronic pain, COPD (chronic obstructive pulmonary disease) ([address] Dyslipidemia, GERD (gastroesophageal reflux disease), Hepatitis A, History of rib fractures multiple, Hypertension, Other accident, Pneumonia, Rectal bleeding, Rectal pain, S/P thoracic aortic aneurysm repair, Severe single current episode of major depressive disorder, without psychotic features ([address] and Spinal fracture. none otherwise stated from nurses notes  Surgical History:  has a past surgical history that includes Tonsillectomy and adenoidectomy; Anterior cruciate ligament repair (Left); other surgical history; Skin graft; Coronary aneurysm repair (9/24/12); Circumcision; Knee arthroscopy (Right, 2012); Knee arthroscopy (Left, 2003); Cardiac catheterization (9/19/12); Total knee arthroplasty (Left, 3/3/2015); Thoracic aortic aneurysm repair (2012); and joint replacement. none otherwise stated from nurses notes  Social History:  reports that he quit smoking about 30 years ago. His smoking use included cigarettes. He started smoking about 49 years ago.  He has a provide analgesia and reassess. Emergency Department course:  CT head unremarkable. Pt reassessed and feeling better. D/w pt the results, treatment plan, warning precautions for prompt ED return and importance of close OP FU, he verbalizes understanding and agrees with the treatment plan. FINAL IMPRESSION:     1.  Occipital neuralgia of right side          DISPOSITION:  DISPOSITION Decision To Discharge 08/16/2021 10:54:59 AM        PATIENT REFERRED TO:  TAYLOR Huddleston - CNP  [address] [facility]  [address] [facility] [address]  [address] [facility] [address]  [phone]    In 3 days        DISCHARGE MEDICATIONS:  Discharge Medication List as of 8/16/2021 10:55 AM          (Please note that portions of this note were completed with a voice recognition program.  Efforts were made to edit the dictations but occasionally words are mis-transcribed.)        Rad Friend MD  08/17/21 7821

## 2021-09-17 ENCOUNTER — HOSPITAL ENCOUNTER (EMERGENCY)
Age: 61
Discharge: HOME | End: 2021-09-17
Attending: EMERGENCY MEDICINE
Payer: MEDICARE

## 2021-09-17 VITALS
RESPIRATION RATE: 15 BRPM | SYSTOLIC BLOOD PRESSURE: 141 MMHG | TEMPERATURE: 97.3 F | DIASTOLIC BLOOD PRESSURE: 88 MMHG | OXYGEN SATURATION: 98 % | HEART RATE: 90 BPM

## 2021-09-17 DIAGNOSIS — T30.0 BURN: Primary | ICD-10-CM

## 2021-09-17 PROCEDURE — 99282 EMERGENCY DEPT VISIT SF MDM: CPT

## 2021-09-17 PROCEDURE — 6360000002 HC RX W HCPCS: Performed by: STUDENT IN AN ORGANIZED HEALTH CARE EDUCATION/TRAINING PROGRAM

## 2021-09-17 PROCEDURE — 90715 TDAP VACCINE 7 YRS/> IM: CPT | Performed by: STUDENT IN AN ORGANIZED HEALTH CARE EDUCATION/TRAINING PROGRAM

## 2021-09-17 PROCEDURE — 90471 IMMUNIZATION ADMIN: CPT | Performed by: STUDENT IN AN ORGANIZED HEALTH CARE EDUCATION/TRAINING PROGRAM

## 2021-09-17 PROCEDURE — 6370000000 HC RX 637 (ALT 250 FOR IP): Performed by: STUDENT IN AN ORGANIZED HEALTH CARE EDUCATION/TRAINING PROGRAM

## 2021-09-17 PROCEDURE — 96372 THER/PROPH/DIAG INJ SC/IM: CPT

## 2021-09-17 RX ORDER — DOXYCYCLINE HYCLATE 100 MG
100 TABLET ORAL 2 TIMES DAILY
Qty: 14 TABLET | Refills: 0 | Status: SHIPPED | OUTPATIENT
Start: 2021-09-17 | End: 2021-09-17

## 2021-09-17 RX ORDER — DOXYCYCLINE HYCLATE 100 MG
100 TABLET ORAL ONCE
Status: DISCONTINUED | OUTPATIENT
Start: 2021-09-17 | End: 2021-09-17

## 2021-09-17 RX ORDER — FENTANYL CITRATE 50 UG/ML
50 INJECTION, SOLUTION INTRAMUSCULAR; INTRAVENOUS ONCE
Status: COMPLETED | OUTPATIENT
Start: 2021-09-17 | End: 2021-09-17

## 2021-09-17 RX ORDER — BACITRACIN, NEOMYCIN, POLYMYXIN B 400; 3.5; 5 [USP'U]/G; MG/G; [USP'U]/G
OINTMENT TOPICAL
Qty: 1 EACH | Refills: 0 | Status: SHIPPED | OUTPATIENT
Start: 2021-09-17 | End: 2021-09-27

## 2021-09-17 RX ORDER — ACETAMINOPHEN 500 MG
1000 TABLET ORAL ONCE
Status: COMPLETED | OUTPATIENT
Start: 2021-09-17 | End: 2021-09-17

## 2021-09-17 RX ORDER — GINSENG 100 MG
CAPSULE ORAL 2 TIMES DAILY
Status: DISCONTINUED | OUTPATIENT
Start: 2021-09-17 | End: 2021-09-17 | Stop reason: HOSPADM

## 2021-09-17 RX ORDER — OXYCODONE HYDROCHLORIDE AND ACETAMINOPHEN 5; 325 MG/1; MG/1
1 TABLET ORAL EVERY 6 HOURS PRN
Qty: 12 TABLET | Refills: 0 | Status: SHIPPED | OUTPATIENT
Start: 2021-09-17 | End: 2021-09-22

## 2021-09-17 RX ORDER — OXYCODONE HYDROCHLORIDE 5 MG/1
10 TABLET ORAL ONCE
Status: COMPLETED | OUTPATIENT
Start: 2021-09-17 | End: 2021-09-17

## 2021-09-17 RX ADMIN — BACITRACIN: 500 OINTMENT TOPICAL at 13:18

## 2021-09-17 RX ADMIN — ACETAMINOPHEN 1000 MG: 500 TABLET ORAL at 13:49

## 2021-09-17 RX ADMIN — FENTANYL CITRATE 50 MCG: 50 INJECTION, SOLUTION INTRAMUSCULAR; INTRAVENOUS at 13:17

## 2021-09-17 RX ADMIN — OXYCODONE HYDROCHLORIDE 10 MG: 5 TABLET ORAL at 15:21

## 2021-09-17 RX ADMIN — TETANUS TOXOID, REDUCED DIPHTHERIA TOXOID AND ACELLULAR PERTUSSIS VACCINE, ADSORBED 0.5 ML: 5; 2.5; 8; 8; 2.5 SUSPENSION INTRAMUSCULAR at 14:41

## 2021-09-17 ASSESSMENT — ENCOUNTER SYMPTOMS
ABDOMINAL PAIN: 0
EYE ITCHING: 0
SINUS PAIN: 0
VOMITING: 0
RHINORRHEA: 1
BACK PAIN: 1
CONSTIPATION: 0
EYE PAIN: 0
NAUSEA: 0
ABDOMINAL DISTENTION: 0
EYE DISCHARGE: 0
SHORTNESS OF BREATH: 0
CHEST TIGHTNESS: 0
SINUS PRESSURE: 0
EYE REDNESS: 0
DIARRHEA: 0
COLOR CHANGE: 0

## 2021-09-17 ASSESSMENT — PAIN DESCRIPTION - PAIN TYPE: TYPE: ACUTE PAIN

## 2021-09-17 ASSESSMENT — PAIN SCALES - GENERAL
PAINLEVEL_OUTOF10: 10

## 2021-09-17 ASSESSMENT — PAIN DESCRIPTION - LOCATION: LOCATION: BACK;NECK;SHOULDER

## 2021-09-17 ASSESSMENT — PAIN DESCRIPTION - ORIENTATION: ORIENTATION: LEFT

## 2021-09-17 NOTE — ED NOTES
Trauma at bedside     Matt Hutchinson, [address] [facility]  09/17/21 9373
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
## 2021-09-17 NOTE — FLOWSHEET NOTE
[facility] CARE DEPARTMENT - [address]     Emergency/Trauma Note    PATIENT NAME: Phoebe Ceja    Shift date: 09/17/2021  Shift day: Friday   Shift # 1    Room # 43/43   Name: Phoebe Ceja            Age: 64 y.o. Gender: male          Episcopalian: Yazidi   Place of Hindu:     Trauma/Incident type: Adult Trauma Consult  Admit Date & Time: 9/17/2021 12:57 PM  TRAUMA NAME: None    ADVANCE DIRECTIVES IN CHART? No    NAME OF DECISION MAKER: Unknown    RELATIONSHIP OF DECISION MAKER TO PATIENT:     PATIENT/EVENT DESCRIPTION:  Phoebe Ceja is a 64 y.o. male who arrived ED as adult trauma consult. Patient was sitting up in his bed when  visited. Patient to be admitted to 43/43. SPIRITUAL ASSESSMENT/INTERVENTION:  Patient was receptive to spiritual care and open to prayer. Patient said he was raised Yazidi. Family was present at the time.  maintained presence, offered support, prayed with patient and family and reassured them that they were in good hands. Patient and family expressed appreciation for the spiritual and emotional support they received. PATIENT BELONGINGS:  This  did not handle patient's belongings. ANY BELONGINGS OF SIGNIFICANT VALUE NOTED:  Unknown    REGISTRATION STAFF NOTIFIED? Yes    WHAT IS YOUR SPIRITUAL CARE PLAN FOR THIS PATIENT?:   Follow up visits recommended for ongoing assessment of patient's condition and for more prayers and support. Electronically signed by Fr. Suha Rodríguez on [facility]17/2021 [facility]  [phone]       09/17/21 1356   Encounter Summary   Services provided to: Patient and family together   Support System Family members   Place of [address] Visiting   (09/17/2021)   Complexity of Encounter Moderate   Length of Encounter 30 minutes   Spiritual Assessment Completed Yes   Routine   Type Initial   Crisis   Type Trauma   Assessment Calm;Approachable; Hopeful   Intervention Active listening;Prayer;[place]   Outcome Expressed gratitude   Spiritual/Latter-day   Type Spiritual support
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
## 2021-09-17 NOTE — ED NOTES
Discharge instructions and medication list reviewed with patient. All questions answered at this time.         Sue Stovall RN  09/17/21 0101
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
## 2021-09-18 ASSESSMENT — ENCOUNTER SYMPTOMS
ROS SKIN COMMENTS: BURN
BACK PAIN: 0
SHORTNESS OF BREATH: 0
ABDOMINAL PAIN: 0
NAUSEA: 0
VOMITING: 0
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
## 2021-09-28 ENCOUNTER — OFFICE VISIT (OUTPATIENT)
Dept: SURGERY | Age: 61
End: 2021-09-28
Payer: MEDICARE

## 2021-09-28 VITALS
HEART RATE: 64 BPM | HEIGHT: 69 IN | BODY MASS INDEX: 19.11 KG/M2 | DIASTOLIC BLOOD PRESSURE: 79 MMHG | WEIGHT: 129 LBS | SYSTOLIC BLOOD PRESSURE: 129 MMHG

## 2021-09-28 DIAGNOSIS — T30.0 BURN: Primary | ICD-10-CM

## 2021-09-28 PROCEDURE — 99202 OFFICE O/P NEW SF 15 MIN: CPT | Performed by: SPECIALIST

## 2021-09-28 RX ORDER — WATER / MINERAL OIL / WHITE PETROLATUM 16 OZ
CREAM TOPICAL
Qty: 113 G | Refills: 1 | Status: SHIPPED | OUTPATIENT
Start: 2021-09-28 | End: 2021-09-28

## 2021-09-28 RX ORDER — BACITRACIN ZINC AND POLYMYXIN B SULFATE 500; 1000 [USP'U]/G; [USP'U]/G
OINTMENT TOPICAL
Qty: 28 G | Refills: 1 | Status: SHIPPED | OUTPATIENT
Start: 2021-09-28 | End: 2021-09-28

## 2021-09-28 RX ORDER — WATER / MINERAL OIL / WHITE PETROLATUM 16 OZ
CREAM TOPICAL
Qty: 113 G | Refills: 1 | Status: SHIPPED | OUTPATIENT
Start: 2021-09-28

## 2021-09-28 RX ORDER — BACITRACIN ZINC AND POLYMYXIN B SULFATE 500; 1000 [USP'U]/G; [USP'U]/G
OINTMENT TOPICAL
Qty: 28 G | Refills: 1 | Status: SHIPPED | OUTPATIENT
Start: 2021-09-28 | End: 2021-10-05

## 2021-09-28 RX ORDER — LANOLIN ALCOHOL/MO/W.PET/CERES
CREAM (GRAM) TOPICAL 2 TIMES DAILY
Status: SHIPPED | OUTPATIENT
Start: 2021-09-28

## 2021-09-28 RX ORDER — DIAPER,BRIEF,INFANT-TODD,DISP
EACH MISCELLANEOUS ONCE
Status: COMPLETED | OUTPATIENT
Start: 2021-09-28 | End: 2021-09-28

## 2021-09-28 RX ADMIN — Medication: at 14:19

## 2021-09-28 RX ADMIN — Medication: at 14:13
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
## 2021-10-12 ENCOUNTER — OFFICE VISIT (OUTPATIENT)
Dept: SURGERY | Age: 61
End: 2021-10-12
Payer: MEDICARE

## 2021-10-12 VITALS
HEIGHT: 69 IN | WEIGHT: 143 LBS | BODY MASS INDEX: 21.18 KG/M2 | HEART RATE: 69 BPM | DIASTOLIC BLOOD PRESSURE: 77 MMHG | SYSTOLIC BLOOD PRESSURE: 112 MMHG

## 2021-10-12 DIAGNOSIS — T30.0 BURN: Primary | ICD-10-CM

## 2021-10-12 PROCEDURE — 99212 OFFICE O/P EST SF 10 MIN: CPT | Performed by: SPECIALIST

## 2021-10-23 ENCOUNTER — HOSPITAL ENCOUNTER (EMERGENCY)
Age: 61
Discharge: HOME | End: 2021-10-23
Attending: EMERGENCY MEDICINE
Payer: MEDICARE

## 2021-10-23 VITALS
HEART RATE: 67 BPM | WEIGHT: 140 LBS | DIASTOLIC BLOOD PRESSURE: 107 MMHG | BODY MASS INDEX: 20.67 KG/M2 | TEMPERATURE: 98.3 F | OXYGEN SATURATION: 96 % | RESPIRATION RATE: 16 BRPM | SYSTOLIC BLOOD PRESSURE: 179 MMHG

## 2021-10-23 DIAGNOSIS — M25.512 ACUTE PAIN OF LEFT SHOULDER: Primary | ICD-10-CM

## 2021-10-23 PROCEDURE — 96372 THER/PROPH/DIAG INJ SC/IM: CPT

## 2021-10-23 PROCEDURE — 6370000000 HC RX 637 (ALT 250 FOR IP): Performed by: STUDENT IN AN ORGANIZED HEALTH CARE EDUCATION/TRAINING PROGRAM

## 2021-10-23 PROCEDURE — 93005 ELECTROCARDIOGRAM TRACING: CPT | Performed by: STUDENT IN AN ORGANIZED HEALTH CARE EDUCATION/TRAINING PROGRAM

## 2021-10-23 PROCEDURE — 6360000002 HC RX W HCPCS: Performed by: STUDENT IN AN ORGANIZED HEALTH CARE EDUCATION/TRAINING PROGRAM

## 2021-10-23 PROCEDURE — 99282 EMERGENCY DEPT VISIT SF MDM: CPT

## 2021-10-23 RX ORDER — ORPHENADRINE CITRATE 30 MG/ML
60 INJECTION INTRAMUSCULAR; INTRAVENOUS ONCE
Status: COMPLETED | OUTPATIENT
Start: 2021-10-23 | End: 2021-10-23

## 2021-10-23 RX ORDER — LIDOCAINE 50 MG/G
1 PATCH TOPICAL DAILY
Qty: 10 PATCH | Refills: 0 | Status: SHIPPED | OUTPATIENT
Start: 2021-10-23 | End: 2021-11-02

## 2021-10-23 RX ORDER — CYCLOBENZAPRINE HCL 5 MG
5 TABLET ORAL 2 TIMES DAILY PRN
Qty: 10 TABLET | Refills: 0 | Status: SHIPPED | OUTPATIENT
Start: 2021-10-23 | End: 2021-10-28

## 2021-10-23 RX ORDER — LIDOCAINE 4 G/G
1 PATCH TOPICAL ONCE
Status: DISCONTINUED | OUTPATIENT
Start: 2021-10-23 | End: 2021-10-23 | Stop reason: HOSPADM

## 2021-10-23 RX ADMIN — ORPHENADRINE CITRATE 60 MG: 30 INJECTION INTRAMUSCULAR; INTRAVENOUS at 13:26

## 2021-10-23 ASSESSMENT — PAIN DESCRIPTION - ORIENTATION: ORIENTATION: LEFT

## 2021-10-23 ASSESSMENT — PAIN DESCRIPTION - PAIN TYPE: TYPE: ACUTE PAIN

## 2021-10-23 ASSESSMENT — PAIN DESCRIPTION - LOCATION: LOCATION: SHOULDER

## 2021-10-23 ASSESSMENT — PAIN DESCRIPTION - FREQUENCY: FREQUENCY: CONTINUOUS

## 2021-10-23 ASSESSMENT — PAIN SCALES - GENERAL: PAINLEVEL_OUTOF10: 8

## 2021-10-23 ASSESSMENT — PAIN DESCRIPTION - ONSET: ONSET: SUDDEN

## 2021-10-23 ASSESSMENT — PAIN DESCRIPTION - DESCRIPTORS: DESCRIPTORS: ACHING
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
## 2021-10-23 NOTE — ED PROVIDER NOTES
other surgical history; Skin graft; Coronary aneurysm repair (9/24/12); Circumcision; Knee arthroscopy (Right, 2012); Knee arthroscopy (Left, 2003); Cardiac catheterization (9/19/12); Total knee arthroplasty (Left, 3/3/2015); Thoracic aortic aneurysm repair (2012); and joint replacement. Social:  reports that he quit smoking about 31 years ago. His smoking use included cigarettes. He started smoking about 49 years ago. He has a 22.00 pack-year smoking history. He has never used smokeless tobacco. He reports current drug use. Frequency: 3.00 times per week. Drug: Marijuana. He reports that he does not drink alcohol. Family Hx:   Family History   Problem Relation Age of Onset    Heart Disease Mother     Diabetes Mother    Ant Reading Sister         colon-uterine   Costella Erick Diabetes Sister     Kidney Disease Sister     Diabetes Sister     Diabetes Sister     Diabetes Brother        Allergies:  Bactrim [sulfamethoxazole-trimethoprim], Keflex [cephalexin], Aleve [naproxen], Lyrica [pregabalin], Tramadol, and Vicodin [hydrocodone-acetaminophen]    Home Medications:  Prior to Admission medications    Medication Sig Start Date End Date Taking? Authorizing Provider   cyclobenzaprine (FLEXERIL) 5 MG tablet Take 1 tablet by mouth 2 times daily as needed for Muscle spasms 10/23/21 10/28/21 Yes Anastacio Orozco, DO   lidocaine (LIDODERM) 5 % Place 1 patch onto the skin daily for 10 days 12 hours on, 12 hours off. 10/23/21 11/2/21 Yes Anastacio Orozco, DO   Skin Protectants, Misc. (EUCERIN) cream Apply topically twice daily. 9/28/21   Mohammed Gae Cabot, MD   oxyCODONE-acetaminophen (PERCOCET) 5-325 MG per tablet Take 1 tablet by mouth every 8 hours as needed for Pain for up to 30 days. Do not fill until 9/25/2021 9/24/21 10/24/21  Ruthell Single, APRN - CNP   silver sulfADIAZINE (SILVADENE) 1 % cream Apply topically daily.  9/21/21   Ruthell Single, APRN - CNP   gabapentin (NEURONTIN) 800 MG tablet TAKE ONE TABLET BY MOUTH FOUR TIMES A DAY 9/13/21 10/13/21  TAYLOR Ovalle CNP   vitamin D (ERGOCALCIFEROL) 1.25 MG (97454 UT) CAPS capsule Take 1 capsule by mouth once a week 5/28/21   TAYLOR Ovalle CNP   losartan (COZAAR) 50 MG tablet  10/26/20   Historical Provider, MD   amLODIPine (NORVASC) 5 MG tablet TAKE 1 TABLET BY MOUTH DAILY 12/2/19   TAYLOR Ovalle CNP   aspirin 81 MG chewable tablet Take 1 tablet by mouth daily 12/2/19   TAYLOR Ovalel CNP   albuterol (PROVENTIL) (2.5 MG/3ML) 0.083% nebulizer solution Take 3 mLs by nebulization every 6 hours as needed for Wheezing 12/2/19   TAYLOR Ovalle CNP   Blood Pressure KIT Diagnosis: HTN. Needs to check blood pressure 1-2 times a day until stable, then once a day. Goal blood pressure less than 135/85, and above 110/60. 1/17/19   Frederick Lagunas MD       REVIEW OFSYSTEMS    (2-9 systems for level 4, 10 or more for level 5)      Review of Systems   Constitutional: Negative for chills and fever. HENT: Negative for congestion, ear pain and rhinorrhea. Eyes: Negative for pain and discharge. Respiratory: Negative for cough and shortness of breath. Cardiovascular: Negative for chest pain and palpitations. Gastrointestinal: Negative for abdominal pain, constipation, diarrhea, nausea and vomiting. Genitourinary: Negative for difficulty urinating and hematuria. Musculoskeletal: Positive for arthralgias (left shoulder pain). Negative for myalgias. Skin: Negative for rash and wound. Neurological: Negative for light-headedness and headaches. PHYSICAL EXAM   (up to 7 for level 4, 8 or more forlevel 5)      INITIAL VITALS:   Vitals:    10/23/21 1249   BP: (!) 179/107   Pulse:    Resp:    Temp:    SpO2:         Physical Exam  Vitals and nursing note reviewed. Constitutional:       General: He is not in acute distress. Appearance: He is not ill-appearing, toxic-appearing or diaphoretic.    HENT: Head: Normocephalic and atraumatic. Nose: Nose normal. No congestion or rhinorrhea. Mouth/Throat:      Mouth: Mucous membranes are moist.      Pharynx: Oropharynx is clear. No oropharyngeal exudate or posterior oropharyngeal erythema. Eyes:      Pupils: Pupils are equal, round, and reactive to light. Cardiovascular:      Rate and Rhythm: Normal rate and regular rhythm. Pulses: Normal pulses. Heart sounds: Normal heart sounds. Comments: Pulses equal and palpable throughout. Pulmonary:      Effort: Pulmonary effort is normal. No respiratory distress. Breath sounds: Normal breath sounds. No wheezing, rhonchi or rales. Abdominal:      General: Abdomen is flat. There is no distension. Palpations: Abdomen is soft. Tenderness: There is no abdominal tenderness. There is no guarding or rebound. Musculoskeletal:      Cervical back: Normal range of motion. No rigidity. Right lower leg: No edema. Left lower leg: No edema. Comments: Left shoulder has decreased range of motion with sensation intact,  strength intact, left elbow able to move through full range of motion, left wrist able to move through full range of motion. Pulses equal and palpable bilateral radial arteries. Tenderness to palpation of left shoulder. Skin:     General: Skin is warm and dry. Capillary Refill: Capillary refill takes less than 2 seconds. Neurological:      General: No focal deficit present. Mental Status: He is alert and oriented to person, place, and time. Comments: Alert and oriented x4   Psychiatric:         Mood and Affect: Mood normal.         Behavior: Behavior normal.         DIFFERENTIAL  DIAGNOSIS       DDX: Musculoskeletal pain versus rotator cuff tear versus low suspicion for dislocation versus fracture as there is no traumatic injury.     Initial MDM/Plan: 64 y.o. male who presents with acute onset of left shoulder pain after patient slept on it all night. Upon my initial examination patient is resting comfortably in the cot, no acute distress, no respiratory distress, speaking full sentences. Vital signs are stable upon arrival with hypertension noted although patient admittedly has been not taking his antihypertensives for the past couple months which he weaned himself off of without physician approval.    Physical exam is remarkable for tenderness palpation of the left shoulder, patient has not taken anything prior to arrival.  Will provide with Norflex and lidocaine patch. No indication for imaging at this time as there is no reports of history of traumatic injury, falls or other injury. Patient feeling better after muscle relaxer and lidocaine patch. Will provide written prescription for muscle relaxer and lidocaine patch and plan for close follow-up with primary care provider and information follow-up with orthopedic surgery team as needed if symptoms should continue. Patient compliant and understanding of this plan. Patient discharged in stable condition after meeting vitally stable throughout emergency department stay. EMERGENCY DEPARTMENT COURSE:  ED Course as of Oct 24 1952   Sat Oct 23, 2021   1418 Patient feeling better after norflex and lidocaine patch. Discussed options for discharge patient feels comfortable going home with follow-up with primary care provider and orthopedic surgery as needed. Will provide muscle relaxer and lidocaine patches for symptomatic relief at home. [MA]      ED Course User Index  [MA] Fernando Lion,         DIAGNOSTIC RESULTS / EMERGENCYDEPARTMENT COURSE / MDM             PROCEDURES:  None    CONSULTS:  None      FINAL IMPRESSION      1.  Acute pain of left shoulder          DISPOSITION / PLAN     DISPOSITION Decision To Discharge 10/23/2021 02:23:01 PM      PATIENT REFERRED TO:  Horton Cheadle, APRN - CNP  [address] [facility]  [address] [facility],[address]  [address] [place] [address]  [phone]    Call   For post emergency department follow up    [facility]  [address] [place] [address]  [phone]    As needed, If symptoms worsen      DISCHARGE MEDICATIONS:  Discharge Medication List as of 10/23/2021  2:59 PM      START taking these medications    Details   cyclobenzaprine (FLEXERIL) 5 MG tablet Take 1 tablet by mouth 2 times daily as needed for Muscle spasms, Disp-10 tablet, R-0Print      lidocaine (LIDODERM) 5 % Place 1 patch onto the skin daily for 10 days 12 hours on, 12 hours off., Disp-10 patch, R-0Print             Heaven Pichardo DO  Emergency Medicine Resident    (Please note that portions of this note were completed with a voice recognition program.Efforts were made to edit the dictations but occasionally words are mis-transcribed.)     Heaven Pichardo DO  Resident  10/24/21 6645

## 2021-10-23 NOTE — ED NOTES
Left shoulder pain since 3 am.  Pt. Reports percocet and gabapentin for relief. Restricted movement.        Scott Tanner RN  10/23/21 1257

## 2021-10-24 ASSESSMENT — ENCOUNTER SYMPTOMS
ABDOMINAL PAIN: 0
COUGH: 0
EYE PAIN: 0
VOMITING: 0
CONSTIPATION: 0
EYE DISCHARGE: 0
NAUSEA: 0
SHORTNESS OF BREATH: 0
DIARRHEA: 0
RHINORRHEA: 0

## 2021-10-25 LAB
EKG ATRIAL RATE: 64 BPM
EKG P AXIS: 75 DEGREES
EKG P-R INTERVAL: 146 MS
EKG Q-T INTERVAL: 402 MS
EKG QRS DURATION: 94 MS
EKG QTC CALCULATION (BAZETT): 414 MS
EKG R AXIS: 64 DEGREES
EKG T AXIS: 21 DEGREES
EKG VENTRICULAR RATE: 64 BPM

## 2021-10-25 PROCEDURE — 93010 ELECTROCARDIOGRAM REPORT: CPT | Performed by: INTERNAL MEDICINE

## 2022-01-24 ENCOUNTER — TELEPHONE (OUTPATIENT)
Dept: ONCOLOGY | Age: 62
End: 2022-01-24

## 2022-01-24 NOTE — TELEPHONE ENCOUNTER
Smoking history in Epic does not meet criteria as patient quit smoking 31.5 years ago.  is contacting office to see if order/history is incorrect or if provider wants to order something else.

## 2022-01-24 NOTE — TELEPHONE ENCOUNTER
Scheduling/registration transcribe lung screening order. No order in chart. Unable to review order. / contacted.

## 2022-01-29 ENCOUNTER — HOSPITAL ENCOUNTER (OUTPATIENT)
Dept: CT IMAGING | Age: 62
Discharge: HOME | End: 2022-01-31
Payer: MEDICARE

## 2022-01-29 DIAGNOSIS — F17.211 TOBACCO DEPENDENCE DUE TO CIGARETTES, IN REMISSION: ICD-10-CM

## 2022-01-29 PROCEDURE — 71271 CT THORAX LUNG CANCER SCR C-: CPT

## 2022-03-22 ENCOUNTER — OFFICE VISIT (OUTPATIENT)
Dept: UROLOGY | Age: 62
End: 2022-03-22
Payer: MEDICARE

## 2022-03-22 VITALS
BODY MASS INDEX: 20.73 KG/M2 | DIASTOLIC BLOOD PRESSURE: 87 MMHG | SYSTOLIC BLOOD PRESSURE: 136 MMHG | HEART RATE: 65 BPM | TEMPERATURE: 96.4 F | WEIGHT: 140 LBS | HEIGHT: 69 IN

## 2022-03-22 DIAGNOSIS — N52.9 ERECTILE DYSFUNCTION, UNSPECIFIED ERECTILE DYSFUNCTION TYPE: ICD-10-CM

## 2022-03-22 DIAGNOSIS — R39.12 WEAK URINARY STREAM: Primary | ICD-10-CM

## 2022-03-22 PROCEDURE — 99204 OFFICE O/P NEW MOD 45 MIN: CPT | Performed by: UROLOGY

## 2022-03-22 RX ORDER — ALFUZOSIN HYDROCHLORIDE 10 MG/1
10 TABLET, EXTENDED RELEASE ORAL DAILY
Qty: 30 TABLET | Refills: 6 | Status: SHIPPED | OUTPATIENT
Start: 2022-03-22

## 2022-03-22 ASSESSMENT — ENCOUNTER SYMPTOMS
EYES NEGATIVE: 1
BACK PAIN: 0
EYE PAIN: 0
COUGH: 0
SHORTNESS OF BREATH: 0
CONSTIPATION: 0
VOMITING: 0
GASTROINTESTINAL NEGATIVE: 1
EYE REDNESS: 0
ABDOMINAL PAIN: 0
RESPIRATORY NEGATIVE: 1
WHEEZING: 0
DIARRHEA: 0
NAUSEA: 0
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
## 2022-03-22 NOTE — LETTER
[address] [address]  Dept: [phone]  Dept Fax: [phone]        3/22/22    Patient: Katherine Christianson  YOB: 1960    Dear TAYLOR Angulo CNP,    I had the pleasure of seeing one of your patients, Nikki Traylor today in the office today. Below are the relevant portions of my assessment and plan of care. IMPRESSION:  1. Weak urinary stream    2. Erectile dysfunction, unspecified erectile dysfunction type        PLAN:  He has a weak stream.   Will start Uroxatral.   Will hold off on any ED treatment at this time. F/U in 6 weeks. Prescriptions Ordered:  Orders Placed This Encounter   Medications    alfuzosin (UROXATRAL) 10 MG extended release tablet     Sig: Take 1 tablet by mouth daily     Dispense:  30 tablet     Refill:  6      Orders Placed:  No orders of the defined types were placed in this encounter. Thank you for allowing me to participate in the care of this patient. I will keep you updated on this patient's follow up and I look forward to serving you and your patients again in the future.         Daniela Wang MD

## 2022-05-02 ENCOUNTER — HOSPITAL ENCOUNTER (OUTPATIENT)
Age: 62
Discharge: HOME | End: 2022-05-02
Payer: MEDICARE

## 2022-05-02 DIAGNOSIS — Z79.891 LONG TERM PRESCRIPTION OPIATE USE: ICD-10-CM

## 2022-05-02 DIAGNOSIS — I10 ESSENTIAL HYPERTENSION: ICD-10-CM

## 2022-05-02 DIAGNOSIS — E55.9 VITAMIN D INSUFFICIENCY: ICD-10-CM

## 2022-05-02 DIAGNOSIS — F33.1 MODERATE EPISODE OF RECURRENT MAJOR DEPRESSIVE DISORDER (HCC): ICD-10-CM

## 2022-05-02 DIAGNOSIS — Z79.899 ENCOUNTER FOR MEDICATION MANAGEMENT: ICD-10-CM

## 2022-05-02 LAB
ABSOLUTE EOS #: 0.2 K/UL (ref 0–0.4)
ABSOLUTE LYMPH #: 1.3 K/UL (ref 1–4.8)
ABSOLUTE MONO #: 0.5 K/UL (ref 0.1–1.3)
ALBUMIN SERPL-MCNC: 4.2 G/DL (ref 3.5–5.2)
ALP BLD-CCNC: 86 U/L (ref 40–129)
ALT SERPL-CCNC: 9 U/L (ref 5–41)
ANION GAP SERPL CALCULATED.3IONS-SCNC: 6 MMOL/L (ref 9–17)
AST SERPL-CCNC: 15 U/L
BASOPHILS # BLD: 1 % (ref 0–2)
BASOPHILS ABSOLUTE: 0 K/UL (ref 0–0.2)
BILIRUB SERPL-MCNC: 0.32 MG/DL (ref 0.3–1.2)
BUN BLDV-MCNC: 17 MG/DL (ref 8–23)
CALCIUM SERPL-MCNC: 9.2 MG/DL (ref 8.6–10.4)
CHLORIDE BLD-SCNC: 101 MMOL/L (ref 98–107)
CO2: 31 MMOL/L (ref 20–31)
CREAT SERPL-MCNC: 1.01 MG/DL (ref 0.7–1.2)
EOSINOPHILS RELATIVE PERCENT: 3 % (ref 0–4)
GFR AFRICAN AMERICAN: >60 ML/MIN
GFR NON-AFRICAN AMERICAN: >60 ML/MIN
GFR SERPL CREATININE-BSD FRML MDRD: ABNORMAL ML/MIN/{1.73_M2}
GLUCOSE BLD-MCNC: 103 MG/DL (ref 70–99)
HCT VFR BLD CALC: 41.5 % (ref 41–53)
HEMOGLOBIN: 13.8 G/DL (ref 13.5–17.5)
LYMPHOCYTES # BLD: 22 % (ref 24–44)
MCH RBC QN AUTO: 30.9 PG (ref 26–34)
MCHC RBC AUTO-ENTMCNC: 33.3 G/DL (ref 31–37)
MCV RBC AUTO: 92.9 FL (ref 80–100)
MONOCYTES # BLD: 9 % (ref 1–7)
PDW BLD-RTO: 13.9 % (ref 11.5–14.9)
PLATELET # BLD: 210 K/UL (ref 150–450)
PMV BLD AUTO: 8.7 FL (ref 6–12)
POTASSIUM SERPL-SCNC: 4.7 MMOL/L (ref 3.7–5.3)
RBC # BLD: 4.47 M/UL (ref 4.5–5.9)
SEG NEUTROPHILS: 65 % (ref 36–66)
SEGMENTED NEUTROPHILS ABSOLUTE COUNT: 3.9 K/UL (ref 1.3–9.1)
SODIUM BLD-SCNC: 138 MMOL/L (ref 135–144)
THYROXINE, FREE: 1.01 NG/DL (ref 0.93–1.7)
TOTAL PROTEIN: 6.9 G/DL (ref 6.4–8.3)
TSH SERPL DL<=0.05 MIU/L-ACNC: 1.68 UIU/ML (ref 0.3–5)
VITAMIN D 25-HYDROXY: 13.8 NG/ML
WBC # BLD: 5.8 K/UL (ref 3.5–11)

## 2022-05-02 PROCEDURE — 84443 ASSAY THYROID STIM HORMONE: CPT

## 2022-05-02 PROCEDURE — 36415 COLL VENOUS BLD VENIPUNCTURE: CPT

## 2022-05-02 PROCEDURE — 80053 COMPREHEN METABOLIC PANEL: CPT

## 2022-05-02 PROCEDURE — 82306 VITAMIN D 25 HYDROXY: CPT

## 2022-05-02 PROCEDURE — 84439 ASSAY OF FREE THYROXINE: CPT

## 2022-05-02 PROCEDURE — 80307 DRUG TEST PRSMV CHEM ANLYZR: CPT

## 2022-05-02 PROCEDURE — 85025 COMPLETE CBC W/AUTO DIFF WBC: CPT

## 2022-05-03 ENCOUNTER — OFFICE VISIT (OUTPATIENT)
Dept: UROLOGY | Age: 62
End: 2022-05-03
Payer: MEDICARE

## 2022-05-03 VITALS
HEIGHT: 69 IN | DIASTOLIC BLOOD PRESSURE: 86 MMHG | TEMPERATURE: 96.2 F | BODY MASS INDEX: 20.73 KG/M2 | WEIGHT: 140 LBS | HEART RATE: 66 BPM | SYSTOLIC BLOOD PRESSURE: 124 MMHG

## 2022-05-03 DIAGNOSIS — N52.9 ERECTILE DYSFUNCTION, UNSPECIFIED ERECTILE DYSFUNCTION TYPE: ICD-10-CM

## 2022-05-03 DIAGNOSIS — R39.12 WEAK URINARY STREAM: Primary | ICD-10-CM

## 2022-05-03 PROCEDURE — 99214 OFFICE O/P EST MOD 30 MIN: CPT | Performed by: UROLOGY

## 2022-05-03 ASSESSMENT — ENCOUNTER SYMPTOMS
SHORTNESS OF BREATH: 0
ABDOMINAL PAIN: 0
VOMITING: 0
GASTROINTESTINAL NEGATIVE: 1
CONSTIPATION: 0
WHEEZING: 0
DIARRHEA: 0
EYES NEGATIVE: 1
BACK PAIN: 0
EYE PAIN: 0
RESPIRATORY NEGATIVE: 1
COUGH: 0
NAUSEA: 0
EYE REDNESS: 0

## 2022-05-03 NOTE — LETTER
[address] [address]  Dept: [phone]  Dept Fax: [phone]        5/3/22    Patient: Alma Reynolds  YOB: 1960    Dear TAYLOR Brand - CNP,    I had the pleasure of seeing one of your patients, Karissa Pedroza today in the office today. Below are the relevant portions of my assessment and plan of care. IMPRESSION:  1. Weak urinary stream    2. Erectile dysfunction, unspecified erectile dysfunction type        PLAN:  Urination is much better after uroxatral.   Bladder pain has resolved. Nocturia much improved. Continue uroxatral.   Return in about 6 months (around 11/3/2022). Prescriptions Ordered:  No orders of the defined types were placed in this encounter. Orders Placed:  No orders of the defined types were placed in this encounter. Thank you for allowing me to participate in the care of this patient. I will keep you updated on this patient's follow up and I look forward to serving you and your patients again in the future.         Lew Ferreira MD
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
## 2022-05-05 LAB
6-ACETYLMORPHINE, UR: NOT DETECTED
7-AMINOCLONAZEPAM, URINE: NOT DETECTED
ALPHA-OH-ALPRAZ, URINE: NOT DETECTED
ALPHA-OH-MIDAZOLAM, URINE: NOT DETECTED
ALPRAZOLAM, URINE: NOT DETECTED
AMPHETAMINES, URINE: NOT DETECTED
BARBITURATES, URINE: NOT DETECTED
BENZOYLECGONINE, UR: NOT DETECTED
BUPRENORPHINE URINE: NOT DETECTED
CARISOPRODOL, UR: NOT DETECTED
CLONAZEPAM, URINE: NOT DETECTED
CODEINE, URINE: NOT DETECTED
CREATININE URINE: 251.9 MG/DL (ref 20–400)
DIAZEPAM, URINE: NOT DETECTED
DRUGS EXPECTED, UR: NORMAL
EER HI RES INTERP UR: NORMAL
ETHYL GLUCURONIDE UR: NOT DETECTED
FENTANYL URINE: NOT DETECTED
GABAPENTIN: NOT DETECTED
HYDROCODONE, URINE: NOT DETECTED
HYDROMORPHONE, URINE: NOT DETECTED
LORAZEPAM, URINE: NOT DETECTED
MARIJUANA METAB, UR: NOT DETECTED
MDA, UR: NOT DETECTED
MDEA, EVE, UR: NOT DETECTED
MDMA URINE: NOT DETECTED
MEPERIDINE METAB, UR: NOT DETECTED
METHADONE, URINE: NOT DETECTED
METHAMPHETAMINE, URINE: NOT DETECTED
METHYLPHENIDATE: NOT DETECTED
MIDAZOLAM, URINE: NOT DETECTED
MORPHINE URINE: NOT DETECTED
NALOXONE URINE: NOT DETECTED
NORBUPRENORPHINE, URINE: NOT DETECTED
NORDIAZEPAM, URINE: NOT DETECTED
NORFENTANYL, URINE: NOT DETECTED
NORHYDROCODONE, URINE: NOT DETECTED
NOROXYCODONE, URINE: NOT DETECTED
NOROXYMORPHONE, URINE: NOT DETECTED
OXAZEPAM, URINE: NOT DETECTED
OXYCODONE URINE: NOT DETECTED
OXYMORPHONE, URINE: NOT DETECTED
PAIN MANAGEMENT DRUG PANEL INTERP, URINE: NORMAL
PAIN MGT DRUG PANEL, HI RES, UR: NORMAL
PCP,URINE: NOT DETECTED
PHENTERMINE, UR: NOT DETECTED
PREGABALIN: NOT DETECTED
TAPENTADOL, URINE: NOT DETECTED
TAPENTADOL-O-SULFATE, URINE: NOT DETECTED
TEMAZEPAM, URINE: NOT DETECTED
TRAMADOL, URINE: NOT DETECTED
ZOLPIDEM METABOLITE (ZCA), URINE: NOT DETECTED
ZOLPIDEM, URINE: NOT DETECTED

## 2022-05-16 ENCOUNTER — HOSPITAL ENCOUNTER (OUTPATIENT)
Dept: PHYSICAL THERAPY | Age: 62
Setting detail: THERAPIES SERIES
Discharge: HOME | End: 2022-05-16
Payer: MEDICARE

## 2022-05-16 PROCEDURE — 97162 PT EVAL MOD COMPLEX 30 MIN: CPT

## 2022-05-16 PROCEDURE — 97110 THERAPEUTIC EXERCISES: CPT
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
## 2022-05-24 ENCOUNTER — HOSPITAL ENCOUNTER (OUTPATIENT)
Dept: PHYSICAL THERAPY | Age: 62
Setting detail: THERAPIES SERIES
Discharge: HOME | End: 2022-05-24
Payer: MEDICARE

## 2022-05-24 PROCEDURE — 97110 THERAPEUTIC EXERCISES: CPT

## 2022-05-24 PROCEDURE — 97140 MANUAL THERAPY 1/> REGIONS: CPT

## 2022-05-24 NOTE — FLOWSHEET NOTE
[facility] Outpatient Physical Therapy   [address]   Phone: [phone]   Fax: [phone]    Physical Therapy Daily Treatment Note      Date:  2022  Patient Name:  Silvio Welch    :  1960  MRN: [id number]  Physician: Nancy Bauer CNP     Insurance: Medicare/Caid   jaswinderal, then Valerie Whitlock through [employer]  Diagnosis: degenerative cervical spinal stenosis M48.02; thoracic degenerative disc disease M51.34, chronic pain syndrome G89.4; add low back M54.5   Onset Date: 12; more recent referral 22   Next  [address]: 22  Visit# / total visits:   Cancels/No Shows: 0/0    Subjective:  Patient reporting he has increased pain in R side of neck and into R shoulder. Patient stating his pain started last night. Patient also states he has not been compliant with HEP. Pain:  [] Yes  [] No Location: R  Pain Rating: (0-10 scale) 8/10  Pain altered Tx:  [] No  [] Yes  Action:  Comments:    Objective:  Modalities:   Precautions:  Exercises: MHP to thoracic and cervical spine with all supine exercises - focused on R shoulder   Exercise Reps/ Time Weight/ Level Completed  Today Comments   Mat       Abdominal bracing  15x  x    priformis 2x 30\"  x                  Scapular retraction- supine 15x 3\"  x 6x and ex was stopped due to increase pain in R UE   Chin tucks-supine 10x 3\"  x                  Manual 25'  x    Cervical traction   x Performed for relief throughout session   UT stretch 2x 30\"  x    P/A mobs to cervical spine 5'  x Gentle to not exacerbate symptoms     Gentle STM to SCM 15'  x    Other:  Access Code: [email or web address]  URL: [email or web address]  Date: 2022  Prepared by: Ashley Hannon    Exercises  Seated Upper Trapezius Stretch - 1 x daily - 7 x weekly - 3-5 reps - 30 seconcd hold  Supine Cervical Retraction with Towel - 1 x daily - 7 x weekly - 10 reps - 3-5 hold  Supine Transversus Abdominis Bracing - Hands on Stomach - 1 x daily - 7 x weekly - 10 reps - 5 second hold    Specific Instructions for next treatment:      Assessment: [] Progressing toward goals. [] No change. [] Other:    [x] Patient would continue to benefit from skilled physical therapy services in order to: improve balance, functional tolerance of ambulation, standing activities. 5/24: Initial session post eval initiating mat level exercises. Focused primarily on manual this session to decrease tightness and improve mobility/tolerance to mobility. Patient reporting shooting pain throughout session with minimal relief noted with cervical traction. Added scapular retraction in supine but had to stop exercise as it caused increased pain in RUE. Educated patient on the use of a towel roll to help provide support of natural curvature and neutral positioning of cervical spine. Patient states th MHP somewhat helped with relieving pain. Educated patient on the importance of consistency with performing HEP. Goals  Short term goals  Time Frame for Short term goals: 6 visits  Short term goal 1: Improved balance and able to do Tandem balance up to 5 seconds  Short term goal 2: LE strength testing to 4/5 for (R) LE strength testing  Short term goal 3: IND and compliant with HEP  Short term goal 4: improved pain levels to 4-5/10  Short term goal 5: Improved neural tension testing grossly overall  Long term goals  Time Frame for Long term goals : 12 visits  Long term goal 1: 1/10 pain  Long term goal 2: 4+/5 LE strength testing  Long term goal 3: Normalized cervical ROM  Long term goal 4: End range limitation with neural tension testing only. Pt. Education:  [x] Yes  [] No  [] Reviewed Prior HEP/Ed  Method of Education: [x] Verbal  [] Demo  [x] Written  Comprehension of Education:  [x] Verbalizes understanding. [] Demonstrates understanding. [] Needs review. [] Demonstrates/verbalizes HEP/Ed previously given. Plan: [x] Continue per plan of care.    [] Other:      Treatment Charges: Mins Units   []  Modalities     [x]  Ther Exercise 18 1   [x]  Manual Therapy 25 2   []  Ther Activities     []  Aquatics     []  Neuromuscular     [] Vasocompression     [] Gait Training     [] Dry needling        [] 1 or 2 muscles        [] 3 or more muscles     []  Other     Total Treatment time 43 3     Time In: 5646            Time Out: 0900    Electronically signed by:  Kole Saavedra PTA

## 2022-05-26 ENCOUNTER — HOSPITAL ENCOUNTER (OUTPATIENT)
Dept: PHYSICAL THERAPY | Age: 62
Setting detail: THERAPIES SERIES
Discharge: HOME | End: 2022-05-26
Payer: MEDICARE

## 2022-05-26 PROCEDURE — 97110 THERAPEUTIC EXERCISES: CPT

## 2022-05-26 PROCEDURE — 97140 MANUAL THERAPY 1/> REGIONS: CPT

## 2022-05-31 ENCOUNTER — HOSPITAL ENCOUNTER (OUTPATIENT)
Dept: PHYSICAL THERAPY | Age: 62
Setting detail: THERAPIES SERIES
Discharge: HOME | End: 2022-05-31
Payer: MEDICARE

## 2022-05-31 PROCEDURE — 97140 MANUAL THERAPY 1/> REGIONS: CPT

## 2022-05-31 PROCEDURE — 97110 THERAPEUTIC EXERCISES: CPT

## 2022-05-31 NOTE — FLOWSHEET NOTE
[address] [facility] Out[facility] Physical [facility]   [address]   Phone: [phone]   Fax: [phone]    Physical Therapy Daily Treatment Note      Date:  2022  Patient Name:  Wesley Gomez    :  1960  MRN: [id number]  Physician: Fareed Alcala CNP     Insurance: Medicare/Caid   eval, then Nhan Stade through [employer]  Diagnosis: degenerative cervical spinal stenosis M48.02; thoracic degenerative disc disease M51.34, chronic pain syndrome G89.4; add low back M54.5   Onset Date: 12; more recent referral 22   Next Dr. Rose Adam: 22  Visit# / total visits:   Cancels/No Shows: 0/0    Subjective:  Patient reporting continued pain in his R shoulder radiating into R hand. 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 Patient states he performs HEP when he has the time to. Pain:  [x] Yes  [] No Location: R  Pain Rating: (0-10 scale) 10/10  Pain altered Tx:  [] No  [] Yes  Action:  Comments:    Objective:  Modalities:   Precautions:  Exercises: MHP to R shoulder this session   Exercise Reps/ Time Weight/ Level Completed  Today Comments   Mat       Abdominal bracing  15x 5\"  x    priformis 3x 30\"  x    Cervical ROM 10x  x Flexion/extension, SB, Rotation          Scapular retraction- supine 15x 3\"      Chin tucks-supine 10x 3\"  x                  Manual 30'  x    Cervical traction   x Performed for relief throughout session   UT stretch 2x 30\"  x    P/A mobs to cervical spine 5'  x Gentle to not exacerbate symptoms     Gentle STM to SCM 15'  x    Other:  Access Code: [email or web address]  URL: [email or web address]  Date: 2022  Prepared by: Zoltan Michael    Exercises  Seated Upper Trapezius Stretch - 1 x daily - 7 x weekly - 3-5 reps - 30 seconcd hold  Supine Cervical Retraction with Towel - 1 x daily - 7 x weekly - 10 reps - 3-5 hold  Supine Transversus Abdominis Bracing - Hands on Stomach - 1 x daily - 7 x weekly - 10 reps - 5 second hold    Specific Instructions for next treatment:      Assessment: [] Progressing toward goals. [] No change. [] Other:    [x] Patient would continue to benefit from skilled physical therapy services in order to: improve balance, functional tolerance of ambulation, standing activities. 5/31: Patient amb into clinic demonstrating more antalgic gait this session. Patient demonstrates decrease floor clearance on RLE when amb. Rest breaks and abrupt stops during exercises due to increase pain in shoulder with patient trying to move his arm around and reposition himself due to pain. Progress limited this session due to high level of pain. Focused on manual this session to try to centralize cause of pain without success. Added supine cervical ROM for cervical support with mobility with increase pain noted in RUE. Goals  Short term goals  Time Frame for Short term goals: 6 visits  Short term goal 1: Improved balance and able to do Tandem balance up to 5 seconds  Short term goal 2: LE strength testing to 4/5 for (R) LE strength testing  Short term goal 3: IND and compliant with HEP  Short term goal 4: improved pain levels to 4-5/10  Short term goal 5: Improved neural tension testing grossly overall  Long term goals  Time Frame for Long term goals : 12 visits  Long term goal 1: 1/10 pain  Long term goal 2: 4+/5 LE strength testing  Long term goal 3: Normalized cervical ROM  Long term goal 4: End range limitation with neural tension testing only. Pt. Education:  [x] Yes  [] No  [] Reviewed Prior HEP/Ed  Method of Education: [x] Verbal  [] Demo  [x] Written  Comprehension of Education:  [x] Verbalizes understanding. [] Demonstrates understanding. [] Needs review. [] Demonstrates/verbalizes HEP/Ed previously given. Plan: [x] Continue per plan of care.    [] Other:      Treatment Charges: Mins Units   []  Modalities     [x]  Ther Exercise 10 1   [x]  Manual Therapy 30 2   []  Ther Activities     []  Aquatics     [] Neuromuscular     [] Vasocompression     [] Gait Training     [] Dry needling        [] 1 or 2 muscles        [] 3 or more muscles     []  Other     Total Treatment time 40 3     Time In: 0900            Time Out: 0940    Electronically signed by:  Luis Alfredo Doan PTA

## 2022-06-02 ENCOUNTER — HOSPITAL ENCOUNTER (OUTPATIENT)
Dept: PHYSICAL THERAPY | Age: 62
Setting detail: THERAPIES SERIES
Discharge: HOME | End: 2022-06-02
Payer: MEDICARE

## 2022-06-02 PROCEDURE — 97140 MANUAL THERAPY 1/> REGIONS: CPT

## 2022-06-02 PROCEDURE — 97110 THERAPEUTIC EXERCISES: CPT

## 2022-06-07 ENCOUNTER — HOSPITAL ENCOUNTER (OUTPATIENT)
Dept: PHYSICAL THERAPY | Age: 62
Setting detail: THERAPIES SERIES
Discharge: HOME | End: 2022-06-07
Payer: MEDICARE

## 2022-06-07 PROCEDURE — 97140 MANUAL THERAPY 1/> REGIONS: CPT

## 2022-06-07 PROCEDURE — 97110 THERAPEUTIC EXERCISES: CPT

## 2022-06-07 NOTE — FLOWSHEET NOTE
509 [facility] Outpatient Physical Therapy   [address]   Phone: [phone]   Fax: [phone]    Physical Therapy Daily Treatment Note      Date:  2022  Patient Name:  Ness Ohara    :  1960  MRN: [id number]  Physician: Heather Patel CNP     Ins[facility]: Medicare[facility]  Diagnosis: degenerative cervical spinal stenosis M48.02; thoracic degenerative disc disease M51.34, chronic pain syndrome G89.4; add low back M54.5   Onset Date: 12; more recent referral 22   Next Dr. Casillas Ree: 22  Visit# / total visits:   Cancels/No Shows: 0/0    Subjective:  Patient reporting radiating pain is not as bad as it use to be. Tenderness noted in R flank. Patient reports that his pain and mobility varies from day to day. Pain:  [x] Yes  [] No Location: R  Pain Rating: (0-10 scale) 8/10  Pain altered Tx:  [] No  [] Yes  Action:  Comments:    Objective:  Modalities:   Precautions:  Exercises: MHP to R shoulder this session   Exercise Reps/ Time Weight/ Level Completed  Today Comments   Mat       Abdominal bracing  15x 5\"      priformis 3x 30\"      Marching  10x2  x Added 6/7 cues to inc ROM   Hip ABD 10x2 3\" holds red x Added 6/7 cues for holds and slow controlled movement. Cervical ROM 10x  x Flexion/extension, SB, Rotation; inc pain noted in RUE with rotation to R side. AAROM shoulder flexion 10x  x Attempted but could not complete due to increased pain. Scapular retraction- supine 15x 3\"      Chin tucks-supine 10x 3\"  x    Flys 10x 3\"  x Added 6/7 no increase in pain noted this session            Manual 20'  x    Cervical traction 10x5\"  x Performed for relief throughout session   UT stretch 2x 30\"  x    P/A mobs to cervical spine 5'   Gentle to not exacerbate symptoms     Gentle STM to SCM        Gentle shoulder traction 10x 5\"  x    Pec release 3'  x    Other:  Access Code: [email or web address]  URL: [email or web address]  Date: 05/24/2022  Prepared by: Kole Saavedra    Exercises  Seated Upper Trapezius Stretch - 1 x daily - 7 x weekly - 3-5 reps - 30 seconcd hold  Supine Cervical Retraction with Towel - 1 x daily - 7 x weekly - 10 reps - 3-5 hold  Supine Transversus Abdominis Bracing - Hands on Stomach - 1 x daily - 7 x weekly - 10 reps - 5 second hold    Specific Instructions for next treatment:      Assessment: [] Progressing toward goals. [] No change. [] Other:    [x] Patient would continue to benefit from skilled physical therapy services in order to: improve balance, functional tolerance of ambulation, standing activities. 6/7: Patient demonstrated shoulder flexion in supine and reported pain in RUE with moving in LUE. Patient demonstrating overall improved tolerance to manual therapy and exercises this session. Added pec release to decrease tightness in R pec to improve shoulder mobility. Added gentle shoulder traction to promote circulation in joint. Added Flys in supine to improve chest/thoracic mobility. Goals  Short term goals  Time Frame for Short term goals: 6 visits  Short term goal 1: Improved balance and able to do Tandem balance up to 5 seconds  Short term goal 2: LE strength testing to 4/5 for (R) LE strength testing  Short term goal 3: IND and compliant with HEP  Short term goal 4: improved pain levels to 4-5/10  Short term goal 5: Improved neural tension testing grossly overall  Long term goals  Time Frame for Long term goals : 12 visits  Long term goal 1: 1/10 pain  Long term goal 2: 4+/5 LE strength testing  Long term goal 3: Normalized cervical ROM  Long term goal 4: End range limitation with neural tension testing only. Pt. Education:  [x] Yes  [] No  [] Reviewed Prior HEP/Ed  Method of Education: [x] Verbal  [] Demo  [x] Written  Comprehension of Education:  [x] Verbalizes understanding. [] Demonstrates understanding. [] Needs review.   [] Demonstrates/verbalizes HEP/Ed previously given. Plan: [x] Continue per plan of care.    [] Other:      Treatment Charges: Mins Units   []  Modalities     [x]  Ther Exercise 25 2   [x]  Manual Therapy 20 1   []  Ther Activities     []  Aquatics     []  Neuromuscular     [] Vasocompression     [] Gait Training     [] Dry needling        [] 1 or 2 muscles        [] 3 or more muscles     []  Other     Total Treatment time 45 3     Time In: 0900            Time Out: 7446    Electronically signed by:  Mindy Sierra PTA

## 2022-06-08 ENCOUNTER — HOSPITAL ENCOUNTER (OUTPATIENT)
Dept: GENERAL RADIOLOGY | Facility: CLINIC | Age: 62
Discharge: HOME | End: 2022-06-10
Payer: MEDICARE

## 2022-06-08 ENCOUNTER — HOSPITAL ENCOUNTER (OUTPATIENT)
Facility: CLINIC | Age: 62
Discharge: HOME | End: 2022-06-10
Payer: MEDICARE

## 2022-06-08 DIAGNOSIS — M50.20 PROTRUDED CERVICAL DISC: ICD-10-CM

## 2022-06-08 DIAGNOSIS — M25.511 CHRONIC RIGHT SHOULDER PAIN: ICD-10-CM

## 2022-06-08 DIAGNOSIS — M51.34 DDD (DEGENERATIVE DISC DISEASE), THORACIC: ICD-10-CM

## 2022-06-08 DIAGNOSIS — R10.32 BILATERAL GROIN PAIN: ICD-10-CM

## 2022-06-08 DIAGNOSIS — M54.12 CERVICAL RADICULOPATHY: ICD-10-CM

## 2022-06-08 DIAGNOSIS — G89.29 CHRONIC RIGHT SHOULDER PAIN: ICD-10-CM

## 2022-06-08 DIAGNOSIS — R10.31 BILATERAL GROIN PAIN: ICD-10-CM

## 2022-06-08 PROCEDURE — 73030 X-RAY EXAM OF SHOULDER: CPT

## 2022-06-08 PROCEDURE — 72072 X-RAY EXAM THORAC SPINE 3VWS: CPT

## 2022-06-08 PROCEDURE — 72040 X-RAY EXAM NECK SPINE 2-3 VW: CPT

## 2022-06-08 PROCEDURE — 73521 X-RAY EXAM HIPS BI 2 VIEWS: CPT

## 2022-06-09 ENCOUNTER — HOSPITAL ENCOUNTER (OUTPATIENT)
Dept: PHYSICAL THERAPY | Age: 62
Setting detail: THERAPIES SERIES
Discharge: HOME | End: 2022-06-09
Payer: MEDICARE

## 2022-06-09 PROCEDURE — 97110 THERAPEUTIC EXERCISES: CPT

## 2022-06-09 PROCEDURE — 97140 MANUAL THERAPY 1/> REGIONS: CPT

## 2022-06-09 NOTE — FLOWSHEET NOTE
509 [facility] Outpatient Physical Therapy   [address]   Phone: [phone]   Fax: [phone]    Physical Therapy Daily Treatment Note      Date:  2022  Patient Name:  Alma Reynolds    :  1960  MRN: [id number]  Physician: Hillary Hurtado CNP     Insurance: Medicare/Caid   eval, then Jonathan Harvey through [employer] 10 total visits eval, 9 additional visits  Diagnosis: degenerative cervical spinal stenosis M48.02; thoracic degenerative disc disease M51.34, chronic pain syndrome G89.4; add low back M54.5   Onset Date: 12; more recent referral 22   Next Dr. Yvonne Rey: 22  Visit# / total visits:  3/10 Cancels/No Shows: 0/0    Subjective:  Patient continues to note significant complaints of (R) hand and arm radicular symptoms, sometimes getting these symptoms \"randomly\" and sometimes with attempting use of arm such as working on boat or car. Continues to exhibit weakness and scissoring gait pattern with ambulation due to (R) hip weakness. Pain:  [x] Yes  [] No Location: R  Pain Rating: (0-10 scale) 10/10  Pain altered Tx:  [] No  [] Yes  Action:  Comments:    Objective:  Modalities:   Precautions:  Exercises:  Exercise Reps/ Time Weight/ Level Completed  Today Comments   Mat       Abdominal bracing  15x 5\"  x    priformis 3x 30\"  x    Cervical ROM 10x  x Flexion/extension, SB, Rotation          Scapular retraction- supine 15x 3\"      Chin tucks-supine 10x 3\"  x                  Manual       Cervical traction   X    UT stretch   x    P/A mobs to cervical spine   x      Gentle STM to SCM   x    Other:Manual:  See above 10'    Specific Instructions for next treatment:      Assessment: [] Progressing toward goals. [] No change. [] Other:    [x] Patient would continue to benefit from skilled physical therapy services in order to: improve balance, functional tolerance of ambulation, standing activities.        : Patient continues to have high pain levels into the arm and weakness of lumbar spine and into the (R) LE. Working on these deficits with added exercises. Goals  Short term goals  Time Frame for Short term goals: 6 visits  Short term goal 1: Improved balance and able to do Tandem balance up to 5 seconds  Short term goal 2: LE strength testing to 4/5 for (R) LE strength testing  Short term goal 3: IND and compliant with HEP  Short term goal 4: improved pain levels to 4-5/10  Short term goal 5: Improved neural tension testing grossly overall  Long term goals  Time Frame for Long term goals : 12 visits  Long term goal 1: 1/10 pain  Long term goal 2: 4+/5 LE strength testing  Long term goal 3: Normalized cervical ROM  Long term goal 4: End range limitation with neural tension testing only. Pt. Education:  [x] Yes  [] No  [] Reviewed Prior HEP/Ed  Method of Education: [x] Verbal  [] Demo  [x] Written  Comprehension of Education:  [x] Verbalizes understanding. [] Demonstrates understanding. [] Needs review. [] Demonstrates/verbalizes HEP/Ed previously given. Plan: [x] Continue per plan of care.    [] Other:      Treatment Charges: Mins Units   []  Modalities     [x]  Ther Exercise 30 2   [x]  Manual Therapy 10 1   []  Ther Activities     []  Aquatics     []  Neuromuscular     [] Vasocompression     [] Gait Training     [] Dry needling        [] 1 or 2 muscles        [] 3 or more muscles     []  Other     Total Treatment time 40 3     Time In: 0845           Time Out: 0930    Electronically signed by:  Abdi Thrasher PT

## 2022-06-09 NOTE — FLOWSHEET NOTE
stabilization, abdominal strengthening. Very limited today with cervical program, had instance of severe arm pain following manual therapy today. Goals  Short term goals  Time Frame for Short term goals: 6 visits  Short term goal 1: Improved balance and able to do Tandem balance up to 5 seconds  Short term goal 2: LE strength testing to 4/5 for (R) LE strength testing  Short term goal 3: IND and compliant with HEP  Short term goal 4: improved pain levels to 4-5/10  Short term goal 5: Improved neural tension testing grossly overall  Long term goals  Time Frame for Long term goals : 12 visits  Long term goal 1: 1/10 pain  Long term goal 2: 4+/5 LE strength testing  Long term goal 3: Normalized cervical ROM  Long term goal 4: End range limitation with neural tension testing only. Pt. Education:  [x] Yes  [] No  [] Reviewed Prior HEP/Ed  Method of Education: [x] Verbal  [] Demo  [x] Written  Comprehension of Education:  [x] Verbalizes understanding. [] Demonstrates understanding. [] Needs review. [] Demonstrates/verbalizes HEP/Ed previously given. Plan: [x] Continue per plan of care.    [] Other:      Treatment Charges: Mins Units   []  Modalities     [x]  Ther Exercise 30 2   [x]  Manual Therapy 10 1   []  Ther Activities     []  Aquatics     []  Neuromuscular     [] Vasocompression     [] Gait Training     [] Dry needling        [] 1 or 2 muscles        [] 3 or more muscles     []  Other     Total Treatment time 40 3     Time In: 0800            Time Out: 0840    Electronically signed by:  Michelle Stout, PT

## 2022-06-10 NOTE — FLOWSHEET NOTE
509 [facility] Outpatient Physical Therapy   [address]   Phone: [phone]   Fax: [phone]    Physical Therapy Daily Treatment Note      Date:  6/10/2022  Patient Name:  Swati Cleary    :  1960  MRN: [id number]  Physician: Flakito Ghosh CNP     Insurance: Medicare/Caid   eval, then Nat Eagle through [employer] EVAL then 9 additional visits approved- 10 total through - Stella Underwood  Diagnosis: degenerative cervical spinal stenosis M48.02; thoracic degenerative disc disease M51.34, chronic pain syndrome G89.4; add low back M54.5   Onset Date: 12; more recent referral 22   Next Dr. Phan Brazil: 22  Visit# / total visits: 6/10  Cancels/No Shows: 0/0    Subjective:  Patient reports continued significant pain into the (R) arm. Leg pain not as bad but weakness continues. Pain:  [x] Yes  [] No Location: R arm, (R) lateral leg to foot  Pain Rating: (0-10 scale) 810  Pain altered Tx:  [] No  [] Yes  Action:  Comments:    Objective:  Modalities:   Precautions:  Exercises: MHP to R shoulder this session   Exercise Reps/ Time Weight/ Level Completed  Today Comments   Mat       Abdominal bracing  15x 5\"  X    priformis 3x 30\"  X    Marching  10x2  x    clamshells 15  X Added today   bridges 15  X Added today   Hip ABD 10x2 3\" holds  x    Cervical ROM 10x  x With pillow   AAROM shoulder flexion 10x  x With pillow   Scapular retraction- supine 15x 3\"      Chin tucks-supine 10x 3\"  x With pillow   Flys 10x 3\"             Manual 20'  x    Cervical traction 10x5\"  x    UT stretch 2x 30\"  x    P/A mobs to cervical spine 5'  X    Gentle STM to SCM        Gentle shoulder traction 10x 5\"      Pec release 3'      Other:      Specific Instructions for next treatment:      Assessment: [] Progressing toward goals. [] No change.      [] Other:    [x] Patient would continue to benefit from skilled physical therapy services in order to: improve balance, functional tolerance of ambulation, standing activities. 6/9: Limited tolerance to cervical ROM previously and doing supine which helps with tolerance. Significant complaints of (R) arm pain continues randomly per patient. Able to add LE and trunk stabilization exercises today. Goals  Short term goals  Time Frame for Short term goals: 6 visits  Short term goal 1: Improved balance and able to do Tandem balance up to 5 seconds  Short term goal 2: LE strength testing to 4/5 for (R) LE strength testing  Short term goal 3: IND and compliant with HEP  Short term goal 4: improved pain levels to 4-5/10  Short term goal 5: Improved neural tension testing grossly overall  Long term goals  Time Frame for Long term goals : 12 visits  Long term goal 1: 1/10 pain  Long term goal 2: 4+/5 LE strength testing  Long term goal 3: Normalized cervical ROM  Long term goal 4: End range limitation with neural tension testing only. Pt. Education:  [x] Yes  [] No  [] Reviewed Prior HEP/Ed  Method of Education: [x] Verbal  [] Demo  [x] Written  Comprehension of Education:  [x] Verbalizes understanding. [] Demonstrates understanding. [] Needs review. [] Demonstrates/verbalizes HEP/Ed previously given. Plan: [x] Continue per plan of care.    [] Other:      Treatment Charges: Mins Units   []  Modalities     [x]  Ther Exercise 30 2   [x]  Manual Therapy 10 1   []  Ther Activities     []  Aquatics     []  Neuromuscular     [] Vasocompression     [] Gait Training     [] Dry needling        [] 1 or 2 muscles        [] 3 or more muscles     []  Other     Total Treatment time 40 3     Time In: 0800            Time Out: 0845    Electronically signed by:  Michaelle Deleon PT

## 2022-06-14 ENCOUNTER — HOSPITAL ENCOUNTER (OUTPATIENT)
Dept: PHYSICAL THERAPY | Age: 62
Setting detail: THERAPIES SERIES
Discharge: HOME | End: 2022-06-14
Payer: MEDICARE

## 2022-06-14 PROCEDURE — 97140 MANUAL THERAPY 1/> REGIONS: CPT

## 2022-06-14 PROCEDURE — 97110 THERAPEUTIC EXERCISES: CPT

## 2022-06-14 NOTE — FLOWSHEET NOTE
[facility]   [address]   Phone: [phone]   Fax: [phone]    Physical Therapy Daily Treatment Note      Date:  2022  Patient Name:  Augustine Pittman    :  1960  MRN: [id number]  Physician: Solomon Moy CNP     Insurance: Medicare/Caid   eval, then [place] through [facility] EVAL then 9 additional visits approved- 10 total through - [facility]  Diagnosis: degenerative cervical spinal stenosis M48.02; thoracic degenerative disc disease M51.34, chronic pain syndrome G89.4; add low back M54.5   Onset Date: 12; more recent referral 22   Next Dr. Edouard Sparks: 22  Visit# / total visits: 7/10  Cancels/No Shows: 0/0    Subjective:  Patient reporting that his pain continues to affect his sleep. Pain:  [x] Yes  [] No Location: R arm, (R) lateral leg to foot  Pain Rating: (0-10 scale) 9/10  Pain altered Tx:  [] No  [] Yes  Action:  Comments:    Objective:  Modalities:   Precautions:  Exercises: MHP to R shoulder this session   Exercise Reps/ Time Weight/ Level Completed  Today Comments   Mat       Abdominal bracing  15x 5\"  x    priformis 3x 30\"  x    Marching  15x  x    clamshells 15x      bridges 15x      Hip ABD 10x2 3\" holds      Cervical ROM 10x   With pillow   AAROM shoulder flexion 10x   With pillow   Scapular retraction- supine 15x 3\"      Chin tucks-supine 10x 3\"   With pillow   Flys 10x 3\"             Manual 20'  x Attempted manual this morning but patient could not tolerate what has been charted for this morning due to increased pain in R shoulder and Manual was stopped. It took about 4' for pain to decrease for patient to be able to tolerate further exercise.    Cervical traction 10x5\"  x    UT stretch 2x 30\"  x    P/A mobs to cervical spine 5'      Gentle STM to [facility]  2'  x    Gentle shoulder traction 5'  x    Trigger point release to posterior shoulder 5'  x    Pec release 3'      Other:      Specific Instructions for next treatment:      Assessment: [] Progressing toward goals. [] No change. [] Other:    [x] Patient would continue to benefit from skilled physical therapy services in order to: improve balance, functional tolerance of ambulation, standing activities. 6/14: Limited progressions made today as patient had increased pain in R shoulder with manual to cervical spine. Increased tenderness noted around shoulder and shooting to wrist of RUE. Patient was able to complete a few low back exercise before requesting to end session early due to feeling too sore to continue. Added trigger point release to posterior shoulder to decrease pain with mild relief noted. Goals  Short term goals  Time Frame for Short term goals: 6 visits  Short term goal 1: Improved balance and able to do Tandem balance up to 5 seconds  Short term goal 2: LE strength testing to 4/5 for (R) LE strength testing  Short term goal 3: IND and compliant with HEP  Short term goal 4: improved pain levels to 4-5/10  Short term goal 5: Improved neural tension testing grossly overall  Long term goals  Time Frame for Long term goals : 12 visits  Long term goal 1: 1/10 pain  Long term goal 2: 4+/5 LE strength testing  Long term goal 3: Normalized cervical ROM  Long term goal 4: End range limitation with neural tension testing only. Pt. Education:  [x] Yes  [] No  [] Reviewed Prior HEP/Ed  Method of Education: [x] Verbal  [] Demo  [x] Written  Comprehension of Education:  [x] Verbalizes understanding. [] Demonstrates understanding. [] Needs review. [] Demonstrates/verbalizes HEP/Ed previously given. Plan: [x] Continue per plan of care.    [] Other:      Treatment Charges: Mins Units   []  Modalities     [x]  Ther Exercise 17 1   [x]  Manual Therapy 20 1   []  Ther Activities     []  Aquatics     []  Neuromuscular     [] Vasocompression     [] Gait Training     [] Dry needling        [] 1 or 2 muscles        [] 3 or more muscles     []  Other Total Treatment time 37 2     Time In: 0730           Time Out: 0730    Electronically signed by:  Asia Shelby PTA

## 2022-06-16 ENCOUNTER — HOSPITAL ENCOUNTER (OUTPATIENT)
Dept: PHYSICAL THERAPY | Age: 62
Setting detail: THERAPIES SERIES
Discharge: HOME | End: 2022-06-16
Payer: MEDICARE

## 2022-06-16 PROCEDURE — 97110 THERAPEUTIC EXERCISES: CPT

## 2022-06-16 PROCEDURE — 97140 MANUAL THERAPY 1/> REGIONS: CPT

## 2022-06-16 NOTE — FLOWSHEET NOTE
[facility]   [address]   Phone: [phone]   Fax: [phone]    Physical Therapy Daily Treatment Note      Date:  2022  Patient Name:  Dorinda Guan    :  1960  MRN: [id number]  Physician: Haley Fang CNP     Insurance: Medicare/Caid   eval, then Glennda Simmering through [facility] EVAL then 9 additional visits approved- 10 total through - [facility]  Diagnosis: degenerative cervical spinal stenosis M48.02; thoracic degenerative disc disease M51.34, chronic pain syndrome G89.4; add low back M54.5   Onset Date: 12; more recent referral 22   Next Dr. [address]: 22  Visit# / total visits: 8/10  Cancels/No Shows: 0/0    Subjective:  Patient amb into clinic without walking stick this session stating his pain isn't that bad today. Patient states he has been sleeping on the floor and states he has been sleeping a little better. Patient states he was able to  his coffee cup this morning without too much trouble. Pain:  [x] Yes  [] No Location: R arm, (R) lateral leg to foot  Pain Rating: (0-10 scale) 710  Pain altered Tx:  [] No  [] Yes  Action:  Comments:    Objective:  Modalities:   Precautions:  Exercises: MHP to R shoulder this session   Exercise Reps/ Time Weight/ Level Completed  Today Comments   Mat       Abdominal bracing  15x 5\"  x    priformis 3x 30\"      Marching  10X2  x    clamshells 15x      bridges 15x  x    Hip ABD 10x2 3\" holds yellow x    Modified crunch with physio ball  15x  x Cues for core engagement and slow controlled movement. LTR with physioball 10x  x Cues for core engagement and slow controlled movement. Cervical ROM 10x   With pillow   AAROM shoulder flexion 10x   With pillow   Scapular retraction- supine 15x 3\"      Chin tucks-supine 10x 3\"   With pillow   Flys 10x 3\"  x Patient reporting increased pain with lowering with RUE.           Manual 8'   Pt demos improved tolerance    Cervical traction 10x5\" x    UT stretch 2x 30\"  x    P/A mobs to cervical spine 3'  x    Gentle STM to SCM  2'      Gentle shoulder traction 5'      Trigger point release to posterior shoulder and RUT 3'  x    Pec release 3'      Other:      Specific Instructions for next treatment:      Assessment: [] Progressing toward goals. [] No change. [] Other:    [x] Patient would continue to benefit from skilled physical therapy services in order to: improve balance, functional tolerance of ambulation, standing activities. 6/16: Patient zulema improved upright posture with amb this date but continues to demonstrate an ataxic gait with BLEs catching the floor and crossing when he walks. Began with manual therapy to cervical spine to decrease tightness and improve mobility with no exacerbation in symptoms noted this session. Followed with core strengthening exercises with added modified crunches and LTR on physio ball to increase core/trunk support. No exacerbation in symptoms noted in low back this date. Goals  Short term goals  Time Frame for Short term goals: 6 visits  Short term goal 1: Improved balance and able to do Tandem balance up to 5 seconds  Short term goal 2: LE strength testing to 4/5 for (R) LE strength testing  Short term goal 3: IND and compliant with HEP  Short term goal 4: improved pain levels to 4-5/10  Short term goal 5: Improved neural tension testing grossly overall  Long term goals  Time Frame for Long term goals : 12 visits  Long term goal 1: 1/10 pain  Long term goal 2: 4+/5 LE strength testing  Long term goal 3: Normalized cervical ROM  Long term goal 4: End range limitation with neural tension testing only. Pt. Education:  [x] Yes  [] No  [] Reviewed Prior HEP/Ed  Method of Education: [x] Verbal  [] Demo  [x] Written  Comprehension of Education:  [x] Verbalizes understanding. [] Demonstrates understanding. [] Needs review. [] Demonstrates/verbalizes HEP/Ed previously given.      Plan: [x] Continue per plan of care.    [] Other:      Treatment Charges: Mins Units   []  Modalities     [x]  Ther Exercise 30 2   [x]  Manual Therapy 8 1   []  Ther Activities     []  Aquatics     []  Neuromuscular     [] Vasocompression     [] Gait Training     [] Dry needling        [] 1 or 2 muscles        [] 3 or more muscles     []  Other     Total Treatment time 38 3     Time In: 5065          Time Out: 2646    Electronically signed by:  Kim Delgado PTA

## 2022-06-22 ENCOUNTER — HOSPITAL ENCOUNTER (OUTPATIENT)
Dept: ULTRASOUND IMAGING | Age: 62
Discharge: HOME | End: 2022-06-24
Payer: MEDICARE

## 2022-06-22 ENCOUNTER — HOSPITAL ENCOUNTER (OUTPATIENT)
Dept: MRI IMAGING | Age: 62
Discharge: HOME | End: 2022-06-24
Payer: MEDICARE

## 2022-06-22 DIAGNOSIS — G89.29 CHRONIC NECK PAIN: ICD-10-CM

## 2022-06-22 DIAGNOSIS — M54.9 CHRONIC UPPER BACK PAIN: ICD-10-CM

## 2022-06-22 DIAGNOSIS — R10.31 BILATERAL GROIN PAIN: ICD-10-CM

## 2022-06-22 DIAGNOSIS — M51.34 DDD (DEGENERATIVE DISC DISEASE), THORACIC: ICD-10-CM

## 2022-06-22 DIAGNOSIS — M54.2 CHRONIC NECK PAIN: ICD-10-CM

## 2022-06-22 DIAGNOSIS — M50.20 PROTRUDED CERVICAL DISC: ICD-10-CM

## 2022-06-22 DIAGNOSIS — M43.9 COMPRESSION DEFORMITY OF VERTEBRA: ICD-10-CM

## 2022-06-22 DIAGNOSIS — R10.32 BILATERAL GROIN PAIN: ICD-10-CM

## 2022-06-22 DIAGNOSIS — M54.12 CERVICAL RADICULOPATHY: ICD-10-CM

## 2022-06-22 DIAGNOSIS — G89.29 CHRONIC UPPER BACK PAIN: ICD-10-CM

## 2022-06-22 PROCEDURE — 76882 US LMTD JT/FCL EVL NVASC XTR: CPT

## 2022-06-22 PROCEDURE — 72141 MRI NECK SPINE W/O DYE: CPT

## 2022-06-22 PROCEDURE — 72146 MRI CHEST SPINE W/O DYE: CPT

## 2022-06-23 ENCOUNTER — HOSPITAL ENCOUNTER (OUTPATIENT)
Dept: PHYSICAL THERAPY | Age: 62
Setting detail: THERAPIES SERIES
Discharge: HOME | End: 2022-06-23
Payer: MEDICARE

## 2022-06-23 PROCEDURE — 97110 THERAPEUTIC EXERCISES: CPT

## 2022-06-23 PROCEDURE — 97140 MANUAL THERAPY 1/> REGIONS: CPT

## 2022-07-02 ENCOUNTER — APPOINTMENT (OUTPATIENT)
Dept: GENERAL RADIOLOGY | Age: 62
End: 2022-07-02
Payer: MEDICARE

## 2022-07-02 ENCOUNTER — HOSPITAL ENCOUNTER (EMERGENCY)
Age: 62
Discharge: HOME | End: 2022-07-02
Attending: EMERGENCY MEDICINE
Payer: MEDICARE

## 2022-07-02 VITALS
DIASTOLIC BLOOD PRESSURE: 97 MMHG | SYSTOLIC BLOOD PRESSURE: 151 MMHG | TEMPERATURE: 98.9 F | RESPIRATION RATE: 15 BRPM | OXYGEN SATURATION: 100 % | HEART RATE: 72 BPM

## 2022-07-02 DIAGNOSIS — S49.91XA INJURY OF RIGHT SHOULDER, INITIAL ENCOUNTER: Primary | ICD-10-CM

## 2022-07-02 PROCEDURE — 73030 X-RAY EXAM OF SHOULDER: CPT

## 2022-07-02 PROCEDURE — 99284 EMERGENCY DEPT VISIT MOD MDM: CPT

## 2022-07-02 PROCEDURE — 96374 THER/PROPH/DIAG INJ IV PUSH: CPT

## 2022-07-02 PROCEDURE — 6360000002 HC RX W HCPCS: Performed by: STUDENT IN AN ORGANIZED HEALTH CARE EDUCATION/TRAINING PROGRAM

## 2022-07-02 RX ORDER — FENTANYL CITRATE 50 UG/ML
25 INJECTION, SOLUTION INTRAMUSCULAR; INTRAVENOUS ONCE
Status: COMPLETED | OUTPATIENT
Start: 2022-07-02 | End: 2022-07-02

## 2022-07-02 RX ORDER — CYCLOBENZAPRINE HCL 10 MG
10 TABLET ORAL 3 TIMES DAILY PRN
Qty: 21 TABLET | Refills: 0 | Status: SHIPPED | OUTPATIENT
Start: 2022-07-02 | End: 2022-07-09

## 2022-07-02 RX ADMIN — FENTANYL CITRATE 25 MCG: 50 INJECTION, SOLUTION INTRAMUSCULAR; INTRAVENOUS at 18:18

## 2022-07-02 ASSESSMENT — PAIN - FUNCTIONAL ASSESSMENT: PAIN_FUNCTIONAL_ASSESSMENT: 0-10

## 2022-07-02 ASSESSMENT — ENCOUNTER SYMPTOMS
BACK PAIN: 0
ABDOMINAL PAIN: 0
SHORTNESS OF BREATH: 0
VOMITING: 0
SORE THROAT: 0
COUGH: 0

## 2022-07-02 ASSESSMENT — PAIN SCALES - GENERAL: PAINLEVEL_OUTOF10: 7

## 2022-07-02 NOTE — ED PROVIDER NOTES
[facility] ED     Emergency Department     Faculty Attestation    I performed a history and physical examination of the patient and discussed management with the resident. I reviewed the residents note and agree with the documented findings and plan of care. Any areas of disagreement are noted on the chart. I was personally present for the key portions of any procedures. I have documented in the chart those procedures where I was not present during the key portions. I have reviewed the emergency nurses triage note. I agree with the chief complaint, past medical history, past surgical history, allergies, medications, social and family history as documented unless otherwise noted below. For Physician Assistant/ Nurse Practitioner cases/documentation I have personally evaluated this patient and have completed at least one if not all key elements of the E/M (history, physical exam, and MDM). Additional findings are as noted. Patient with right shoulder pain was picking up a \"old-fashioned plasma TV,\" and felt her shoulder pop. States he has remote history of dislocations in the past no prior surgeries. He is right-handed. On exam patient peers uncomfortable but nontoxic. Possible deformity anterior shoulder but no open injury. Intact sensation over the deltoid, Intact to radial, median, ulnar nerves for motor and sensation in the right hand. Strong radial ulnar pulse.   Will image, reduce if needed        Critical Care     none    Ashley Mercado MD, [facility]  Attending Emergency  Physician             Ashley Mercado MD  07/02/22 8039

## 2022-07-02 NOTE — ED PROVIDER NOTES
[facility] ED  Emergency Department Encounter  EmergencyMedicine Resident     Pt Zaynab Duncan  MRN: [id number]  [place] 1960  Date of evaluation: 7/2/22  PCP:  TAYLOR Blas CNP    This patient was evaluated in the Emergency Department for symptoms described in the history of present illness. The patient was evaluated in the context of the global COVID-19 pandemic, which necessitated consideration that the patient might be at risk for infection with the SARS-CoV-2 virus that causes COVID-19. Institutional protocols and algorithms that pertain to the evaluation of patients at risk for COVID-19 are in a state of rapid change based on information released by regulatory bodies including the CDC and federal and state organizations. These policies and algorithms were followed during the patient's care in the ED. CHIEF COMPLAINT       Chief Complaint   Patient presents with    Dislocation     right shoulder     HISTORY OF PRESENT ILLNESS  (Location/Symptom, Timing/Onset, Context/Setting, Quality, Duration, Modifying Factors, Severity.)      Mehdi Bowen is a 58 y.o. male who presents with right shoulder pain and possible dislocation, patient states that he was moving a large item when he felt his shoulder pop out, heard a large click. Patient does have a history of a right shoulder dislocation, states that it feels similar. No fall, LOC or head injury. No chemical AC. Patient is on aspirin. Denies numbness and tingling at this time.     PAST MEDICAL / SURGICAL / SOCIAL / FAMILY HISTORY      has a past medical history of Abdominal pain, Ascending aortic aneurysm (HCC), Chronic pain, COPD (chronic obstructive pulmonary disease) ([address] Dyslipidemia, GERD (gastroesophageal reflux disease), Hepatitis A, History of rib fractures multiple, Hypertension, Other accident, Pneumonia, Rectal bleeding, Rectal pain, S/P thoracic aortic aneurysm repair, Severe single current episode of min   Stress:     Feeling of Stress : Not on file   Social Connections:     Frequency of Communication with Friends and Family: Not on file    Frequency of Social Gatherings with Friends and Family: Not on file    Attends Mandaeism Services: Not on file    Active Member of Clubs or Organizations: Not on file    Attends Club or Organization Meetings: Not on file    Marital Status: Not on file   Intimate Partner Violence:     Fear of Current or Ex-Partner: Not on file    Emotionally Abused: Not on file    Physically Abused: Not on file    Sexually Abused: Not on file   Housing Stability:     Unable to Pay for Housing in the Last Year: Not on file    Number of [place] in the Last Year: Not on file    Unstable Housing in the Last Year: Not on file       Family History   Problem Relation Age of Onset    Heart Disease Mother     Diabetes Mother     Cancer Sister         colon-uterine   [employer] Diabetes Sister     Kidney Disease Sister     Diabetes Sister     Diabetes Sister     Diabetes Brother        Allergies:  Bactrim [sulfamethoxazole-trimethoprim], Keflex [cephalexin], Aleve [naproxen], Lyrica [pregabalin], Tramadol, and Vicodin [hydrocodone-acetaminophen]    Home Medications:  Prior to Admission medications    Medication Sig Start Date End Date Taking? Authorizing Provider   cyclobenzaprine (FLEXERIL) 10 MG tablet Take 1 tablet by mouth 3 times daily as needed for Muscle spasms 7/2/22 7/9/22 Yes Glendy Lora MD   oxyCODONE-acetaminophen (PERCOCET) 5-325 MG per tablet Take 1 tablet by mouth every 8 hours as needed for Pain for up to 30 days.  6/24/22 7/24/22  Jeffrey Dao, APRN - CNP   gabapentin (NEURONTIN) 800 MG tablet TAKE ONE TABLET BY MOUTH FOUR TIMES A DAY 6/8/22 7/8/22  Jeffrey Dao, APRN - CNP   vitamin D (ERGOCALCIFEROL) 1.25 MG (95605 UT) CAPS capsule Take 1 capsule by mouth once a week 5/6/22   Jeffrey Dao, APRN - CNP   desvenlafaxine succinate (PRISTIQ) 25 MG TB24 extended release tablet Take 1 tablet by mouth daily 4/27/22   Claudetta Rainbow, APRN - CNP   pyridoxine (RA VITAMIN B-6) 50 MG tablet Take 1 tablet by mouth daily 4/27/22 4/27/23  Claudetta Rainbow, APRN - CNP   amLODIPine (NORVASC) 10 MG tablet TAKE 1 TABLET BY MOUTH DAILY 4/25/22   Claudetta RainbowTALYOR - CNP   alfuzosin (UROXATRAL) 10 MG extended release tablet Take 1 tablet by mouth daily 3/22/22   Ashley Moran MD   albuterol sulfate HFA (PROVENTIL HFA) 108 (90 Base) MCG/ACT inhaler Inhale 2 puffs into the lungs every 4 hours as needed for Wheezing or Shortness of Breath 10/27/21   Claudetta Rainbow, APRN - CNP   tiotropium (SPIRIVA RESPIMAT) 2.5 MCG/ACT AERS inhaler Inhale 2 puffs into the lungs daily 10/27/21   Claudetta Rainbow, APRN - CNP   Skin Protectants, Misc. (EUCERIN) cream Apply topically twice daily. 9/28/21   Allie Mckeon MD   losartan (COZAAR) 50 MG tablet  10/26/20   Historical Provider, MD   aspirin 81 MG chewable tablet Take 1 tablet by mouth daily 12/2/19   Claudetta Rainbow, APRN - CNP   albuterol (PROVENTIL) (2.5 MG/3ML) 0.083% nebulizer solution Take 3 mLs by nebulization every 6 hours as needed for Wheezing 12/2/19   Nitonicolette FallonTAYLOR CNP   Blood Pressure KIT Diagnosis: HTN. Needs to check blood pressure 1-2 times a day until stable, then once a day. Goal blood pressure less than 135/85, and above 110/60. 1/17/19   Yehuda Elizabeth MD       REVIEW OF SYSTEMS    (2-9 systems for level 4, 10 or more for level 5)      Review of Systems   Constitutional: Negative for chills and fever. HENT: Negative for sore throat. Eyes: Negative for visual disturbance. Respiratory: Negative for cough and shortness of breath. Cardiovascular: Negative for chest pain and palpitations. Gastrointestinal: Negative for abdominal pain and vomiting. Endocrine: Negative for polyuria. Genitourinary: Negative for dysuria and hematuria.    Musculoskeletal: Negative Dispense:  21 tablet     Refill:  0       DDX: Right shoulder dislocation, sprain, fracture, fracture dislocation, contusion, ecchymosis, laceration    DIAGNOSTIC RESULTS / EMERGENCY DEPARTMENT COURSE / MDM   LAB RESULTS:  No results found for this visit on 07/02/22. IMPRESSION: 80-year-old gentleman presents to the emergency department with right shoulder pain and possible dislocation after moving something heavy and hearing his shoulder pop. Patient is on aspirin, no chemical AC. No other injuries, no head injury, LOC or fall. Vital signs stable. Physical exam showing patient holding right upper extremity in internal rotation and flexion, poor range of movement of right shoulder due to pain. Distal pulses intact, sensation and strength intact. Fentanyl given, ice applied. X-rays unremarkable for dislocation or fracture. Discussed with patient with regards to the possibility of relocation during manipulation or perhaps just a pulled muscle. Patient verbalized agreement understanding. Discussed with patient with regards to follow-up with primary care physician and for return precautions. Stable for discharge. RADIOLOGY:  XR SHOULDER RIGHT (MIN 2 VIEWS)   Final Result   No acute osseous abnormality. EMERGENCY DEPARTMENT COURSE:  ED Course as of 07/02/22 1836   Sat Jul 02, 2022   1831 XR R shoulder neg [EM]   7564 Discussed with patient that there is a possibility that he had relocated on route or during manipulation. Discussed with patient with regards to follow-up with orthopedic surgery, primary care physician and for strict return precautions [EM]      ED Course User Index  [EM] Armen Hernandez MD        No notes of EC Admission Criteria type on file. PROCEDURES:  None    CONSULTS:  None    FINAL IMPRESSION      1.  Injury of right shoulder, initial encounter          DISPOSITION / PLAN     DISPOSITION        PATIENT REFERRED TO:  Gilbert Dejesus, TAYLOR - CNP  [address]    Schedule an appointment as soon as possible for a visit   For follow up    [facility] ED  [address]  Go to   As needed    [facility]  [address], [address] [facility]  [phone]  Schedule an appointment as soon as possible for a visit   For follow up      DISCHARGE MEDICATIONS:  New Prescriptions    CYCLOBENZAPRINE (FLEXERIL) 10 MG TABLET    Take 1 tablet by mouth 3 times daily as needed for Muscle spasms       Radha Francisco MD  Emergency Medicine Resident    (Please note that portions of thisnote were completed with a voice recognition program.  Efforts were made to edit the dictations but occasionally words are mis-transcribed.)        Radha Francisco MD  Resident  07/02/22 4641

## 2022-07-07 ENCOUNTER — HOSPITAL ENCOUNTER (OUTPATIENT)
Dept: PHYSICAL THERAPY | Age: 62
Setting detail: THERAPIES SERIES
Discharge: HOME | End: 2022-07-07
Payer: MEDICARE

## 2022-07-07 PROCEDURE — 97110 THERAPEUTIC EXERCISES: CPT

## 2022-07-07 PROCEDURE — 97140 MANUAL THERAPY 1/> REGIONS: CPT

## 2022-07-07 NOTE — FLOWSHEET NOTE
509 [facility]   Formerly [facility]   Phone: [phone]   Fax: [phone]    Physical Therapy Daily Treatment Note/PROGRESS NOTE      Date:  2022  Patient Name:  Anna Britton    :  1960  MRN: [id number]  Physician: Victor Hugo Sal CNP     Insurance: Medicare/Caid   eval, then Collette Muir through [employer] EVAL then 9 additional visits approved- 10 total through - Jerry Og  Diagnosis: degenerative cervical spinal stenosis M48.02; thoracic degenerative disc disease M51.34, chronic pain syndrome G89.4; add low back M54.5   Onset Date: 12; more recent referral 22   Next Dr. Pelaez Lai: 22  Visit# / total visits: 9/10  Cancels/No Shows: 0/0    Subjective:  Patient requesting return to physician at this time due to continued symptoms. Patient seeing physician on  per patient record. Still noting significant complaints of pain in the neck, into the (R) arm and hand. Pain with gripping/grasping, pain and inability to lift objects out in front of his body. Significant weakness and complaints of radiating pain into the leg from the back. Presents as a cervical disc issue and lumbar disc issue with radiating symptoms but minimal change of symptoms with therapy.   Treatment has included cervical retraction in supine due to inability to tolerate in sitting, general cervical ROM, and general lumbar mobility and stabilization program.   Pain:  [x] Yes  [] No Location: R arm, (R) lateral leg to foot  Pain Rating: (0-10 scale) 7/10  Pain altered Tx:  [] No  [] Yes  Action:  Comments:    Objective:  Modalities:   Precautions:  Exercises: MHP to R shoulder this session 22  Exercise Reps/ Time Weight/ Level Completed  Today Comments   Mat       Abdominal bracing  15x 5\"  x    priformis 3x 30\"      Marching  10X2  x    clamshells 15x  X    bridges 15x  x    Hip ABD 10x2 3\" holds yellow x    Modified crunch with physio ball  15x  x Cues for core engagement and slow controlled movement. LTR with physioball 10x  x Cues for core engagement and slow controlled movement. Cervical ROM 10x  X With pillow   AAROM shoulder flexion 10x  X With pillow   Scapular retraction- supine 15x 3\"      Chin tucks-supine 10x 3\"  X With pillow   Flys 10x 3\"   Patient reporting increased pain with lowering with RUE. Manual 10'   Pt demos improved tolerance    Cervical traction 10x5\"  x    UT stretch 2x 30\"  x    P/A mobs to cervical spine 3'  x    Gentle STM to SCM  2'      Gentle shoulder traction 5'      Trigger point release to posterior shoulder and RUT 3'      Pec release 3'      Other:  Spine    ROM:  Cervical: rotation 70 degrees (L), (R) 70 with pain in C5 central region with (B) testing, SB (L) 25, (R) 25 with (L) sided trap pain with (L) testing; flexion 50 degrees, extension 30 degrees with complaints of pain in burn area of the neck     AROM of shoulder  Flexion  165 degrees (B) with pain in (R) medial scapula  Abduction 145 degrees (B) shoulder pain     Strength RLE  Comment: knee flexion, extension 3/5; hip abduction 3/5, hip flexion, DF 3-/5  Strength LLE  Comment: knee flexion, extension 4-/5; hip abduction 4-/5, hip flexion, DF 4-/5  Strength RUE  Comment: mid trap-3/5 lower trap 3+/5 rhomboids 3+/5  Strength LUE  Comment: mid trap-3/5 lower trap 3+/5 rhomboids 3+/5  Additional Measures  Special Tests: Tandem stance balance is less than 5 seconds (R) behind (L), 5 seconds (L) behind (R)  Other: Gait- unsteady gait, inconsistent foot placement, (L) stance tends to be wider during gait and (R) circumducts during gait; increased trunk compensation in frontal and sagital plane. Specific Instructions for next treatment:      Assessment: [] Progressing toward goals. [] No change.      [] Other:    [x] Patient would continue to benefit from skilled physical therapy services in order to: improve balance, functional tolerance of ambulation, standing activities. 66/23/22- Patient with minor improvements with AROM of shoulder and with (R) LE and UE strength testing with clinical testing. Still remains significantly limited with balance and significant (R) UE and LE pain at this time. Patient treatment has consisted of cervical ROM in tolerable position, manual cervical traction and soft tissue work, and UE strengthening program.  Also have worked on general lumbar mobility, hip and LE strength. Seeing physician 6/24 for guidance RE additional treatment. Goals  Short term goals  Time Frame for Short term goals: 6 visits  Short term goal 1: Improved balance and able to do Tandem balance up to 5 seconds NOT MET  Short term goal 2: LE strength testing to 4/5 for (R) LE strength testing NOT MET  Short term goal 3: IND and compliant with HEP MET  Short term goal 4: improved pain levels to 4-5/10 NOT MET  Short term goal 5: Improved neural tension testing grossly overall NOT MET  Long term goals  Time Frame for Long term goals : 12 visits  Long term goal 1: 1/10 pain NOT MET  Long term goal 2: 4+/5 LE strength testing NOT MET, MINIMAL PROGRESSION  Long term goal 3: Normalized cervical ROM NOT MET, PROGRESSING MINIMALLY  Long term goal 4: End range limitation with neural tension testing only. NOT MET    Pt. Education:  [x] Yes  [] No  [] Reviewed Prior HEP/Ed  Method of Education: [x] Verbal  [] Demo  [x] Written  Comprehension of Education:  [x] Verbalizes understanding. [] Demonstrates understanding. [] Needs review. [] Demonstrates/verbalizes HEP/Ed previously given. Plan: [x] Continue per plan of care.    [] Other:      Treatment Charges: Mins Units   []  Modalities     [x]  Ther Exercise 30 2   [x]  Manual Therapy 10 1   []  Ther Activities     []  Aquatics     []  Neuromuscular     [] Vasocompression     [] Gait Training     [] Dry needling        [] 1 or 2 muscles        [] 3 or more muscles     []  Other     Total Treatment time 40 3     Time In: 0800          Time Out: 0840    Electronically signed by:  Trinity Ngo, PT

## 2022-07-07 NOTE — FLOWSHEET NOTE
[facility]   [address]   Phone: [phone]   Fax: [phone]    Physical Therapy Daily Treatment Note      Date:  2022  Patient Name:  Kimberly Mcgrath    :  1960  MRN: [id number]  Physician: Raine Rendon CNP     Insurance: Medicare/Caid   eval, then Loetta Doniphan through [employer] EVAL then 9 additional visits approved- 10 total through - Evita Gutierrez  Diagnosis: degenerative cervical spinal stenosis M48.02; thoracic degenerative disc disease M51.34, chronic pain syndrome G89.4; add low back M54.5   Onset Date: 12; more recent referral 22   Next Dr. Moise Cramer:   Visit# / total visits: 10/10  Cancels/No Shows: 0/1    Subjective:  Patient states he messed up his arm last week from trying to  a TV. Patient states the pain in his arm has been over powering pain in his back. Pain:  [x] Yes  [] No   R arm: 10/10  Back: 4-5/10  Neck R arm 8-9/10   Pain altered Tx:  [] No  [] Yes  Action:  Comments:    Objective:  Modalities:   Precautions:  Exercises: MHP to R shoulder this session   Exercise Reps/ Time Weight/ Level Completed  Today Comments   Mat       Abdominal bracing  15x 5\"      priformis 3x 30\"      Marching  10X2      clamshells 15x      bridges 15x      Hip ABD 10x2 3\" holds yellow     Modified crunch with physio ball  15x   Cues for core engagement and slow controlled movement. LTR with physioball 10x   Cues for core engagement and slow controlled movement. Cervical ROM 10x   With pillow   AAROM shoulder flexion 10x   With pillow   Scapular retraction- supine 15x 3\"      Chin tucks-supine 10x 3\"   With pillow   Flys 10x 3\"   Patient reporting increased pain with lowering with RUE.           Standing       Shoulder Iso 10x 3\"  x Added7: pain noted with shoulder flexion,                 Seated       Upper trap stretch 2x 30\"  x    Levator stretch 2x 30\"  x    AAROM shoulder   x 7/7 performed throughout session for comfort and repositioning. Manual 10'   Pt demos improved tolerance    Cervical traction 10x5\"      UT stretch 2x 30\"      P/A mobs to cervical spine 3'      Gentle STM to [facility] and UT 2'  x    Gentle shoulder traction 5'      Trigger point release to RUT 8'  x    Pec release to R side 3'  x    Other:      Specific Instructions for next treatment:      Assessment: [] Progressing toward goals. [] No change. [x] Other:    [x] Patient would continue to benefit from skilled physical therapy services in order to: improve balance, functional tolerance of ambulation, standing activities. 7/7:  Focused on cervical and pec stretching this session to improve ROM and mobility of neck and shoulder. Patient reporting most pain with isometric shoulder flexion at ant shoulder. Cues as needed to increase awareness to UT elevation when in seated and when performing exercises to prevent UT compensation and promote relaxation through UT region. Patient performing AAROM to R shoulder to decreased pain between stretches and exercises. Educated patient on seeing doctor if shoulder pain persists and continues to worsen and to alternate between icing and MHP to promote healing and improve shoulder mobility and decrease pain. Goals  Short term goals  Time Frame for Short term goals: 6 visits  Short term goal 1: Improved balance and able to do Tandem balance up to 5 seconds  Short term goal 2: LE strength testing to 4/5 for (R) LE strength testing  Short term goal 3: IND and compliant with HEP  Short term goal 4: improved pain levels to 4-5/10  Short term goal 5: Improved neural tension testing grossly overall  Long term goals  Time Frame for Long term goals : 12 visits  Long term goal 1: 1/10 pain  Long term goal 2: 4+/5 LE strength testing  Long term goal 3: Normalized cervical ROM  Long term goal 4: End range limitation with neural tension testing only.     Pt. Education:  [x] Yes  [] No  [] Reviewed Prior HEP/Ed  Method of Education: [x] Verbal  [] Demo  [x] Written  Comprehension of Education:  [x] Verbalizes understanding. [] Demonstrates understanding. [] Needs review. [] Demonstrates/verbalizes HEP/Ed previously given. Plan: [x] Continue per plan of care.    [] Other:      Treatment Charges: Mins Units   []  Modalities     [x]  Ther Exercise 30 2   [x]  Manual Therapy 10 1   []  Ther Activities     []  Aquatics     []  Neuromuscular     [] Vasocompression     [] Gait Training     [] Dry needling        [] 1 or 2 muscles        [] 3 or more muscles     []  Other     Total Treatment time 40 3     Time In: 8178         Time Out: 5169    Electronically signed by:  Radha Goncalves PTA

## 2022-07-12 ENCOUNTER — HOSPITAL ENCOUNTER (OUTPATIENT)
Dept: PHYSICAL THERAPY | Age: 62
Setting detail: THERAPIES SERIES
Discharge: HOME | End: 2022-07-12
Payer: MEDICARE

## 2022-07-12 PROCEDURE — 97110 THERAPEUTIC EXERCISES: CPT

## 2022-07-12 PROCEDURE — 97140 MANUAL THERAPY 1/> REGIONS: CPT

## 2022-07-12 NOTE — FLOWSHEET NOTE
[facility]   [facility]   Phone: [phone]   Fax: [phone]    Physical Therapy Daily Treatment Note      Date:  2022  Patient Name:  Marge Gee    :  1960  MRN: [id number]  Physician: Maria D Cotton CNP     Insurance: Medicare/Caid  MC eval, then auth through [employer] 30 authorized through [employer] through 10/10/22   Diagnosis: degenerative cervical spinal stenosis M48.02; thoracic degenerative disc disease M51.34, chronic pain syndrome G89.4; add low back M54.5   Onset Date: 12; more recent referral 22   Next  [facility]:   Visit# / total visits:  updated  Cancels/No Shows: 0/1    Subjective:  Patient reporting to therapy stating his arm pain is overpowering any other pain. Pain:  [x] Yes  [] No   R arm: 10/10  Back: 7/10  Neck 7/10   Pain altered Tx:  [] No  [] Yes  Action:  Comments:    Objective:  Modalities:   Precautions:  Exercises: MHP to R shoulder this session   Exercise Reps/ Time Weight/ Level Completed  Today Comments   Mat       Abdominal bracing  15x 5\"      priformis 3x 30\"      Marching  10X2      clamshells 15x      bridges 15x      Hip ABD 10x2 3\" holds yellow     Modified crunch with physio ball  15x   Cues for core engagement and slow controlled movement. LTR with physioball 10x   Cues for core engagement and slow controlled movement. Cervical ROM 10x   With pillow   AAROM shoulder flexion 10x2 dowl tavia x With pillow   Scapular retraction- supine 15x 3\"      Chin tucks-supine 15x 3\"  x With pillow and towel roll    Flys 10x 3\"   Patient reporting increased pain with lowering with RUE. Standing       Shoulder Iso 10x 3\"   Added: pain noted with shoulder flexion,                 Seated       Upper trap stretch 2x 30\"  x    Levator stretch 2x 30\"  x In supine and PROM   AAROM shoulder    /7 performed throughout session for comfort and repositioning.    Manual 18'   Pt demos improved tolerance    Cervical traction 10x5\"  x    UT stretch 2x 30\"      P/A mobs to cervical spine 3'      Grade I-II inferior, PA glides 5'  x    Gentle STM to SCM and UT 2'  x    Gentle shoulder traction 5'      Trigger point release to RUT 8'  x    Pec release to R side 3'      Other:      Specific Instructions for next treatment:      Assessment: [] Progressing toward goals. [] No change. [x] Other:    [x] Patient would continue to benefit from skilled physical therapy services in order to: improve balance, functional tolerance of ambulation, standing activities. 7/12: Patient arriving to therapy without walking stick this session and states he left it in the car. Patient reporting increased pain with pressure at R forearm. Patient reporting decreased symptoms post manual therapy. Added AAROM with cane into shoulder flexion with patient demonstrating improved tolerance post manual.          Goals  Short term goals  Time Frame for Short term goals: 6 visits  Short term goal 1: Improved balance and able to do Tandem balance up to 5 seconds  Short term goal 2: LE strength testing to 4/5 for (R) LE strength testing  Short term goal 3: IND and compliant with HEP  Short term goal 4: improved pain levels to 4-5/10  Short term goal 5: Improved neural tension testing grossly overall  Long term goals  Time Frame for Long term goals : 12 visits  Long term goal 1: 1/10 pain  Long term goal 2: 4+/5 LE strength testing  Long term goal 3: Normalized cervical ROM  Long term goal 4: End range limitation with neural tension testing only. Pt. Education:  [x] Yes  [] No  [] Reviewed Prior HEP/Ed  Method of Education: [x] Verbal  [] Demo  [x] Written  Comprehension of Education:  [x] Verbalizes understanding. [] Demonstrates understanding. [] Needs review. [] Demonstrates/verbalizes HEP/Ed previously given. Plan: [x] Continue per plan of care.    [] Other:      Treatment Charges: Mins Units   []  Modalities

## 2022-07-13 ENCOUNTER — HOSPITAL ENCOUNTER (OUTPATIENT)
Dept: PHYSICAL THERAPY | Age: 62
Setting detail: THERAPIES SERIES
Discharge: HOME | End: 2022-07-13
Payer: MEDICARE

## 2022-07-13 PROCEDURE — 97110 THERAPEUTIC EXERCISES: CPT

## 2022-07-13 PROCEDURE — 97140 MANUAL THERAPY 1/> REGIONS: CPT

## 2022-07-18 NOTE — FLOWSHEET NOTE
509 [facility]   [address]   Phone: [phone]   Fax: [phone]    Physical Therapy Daily Treatment Note      Date:  2022  Patient Name:  Daren Cisneros    :  1960  MRN: [id number]  Physician: Coreen Logan CNP     Insurance: Medicare/Caid  MC eval, then auth through [employer]IM 30 total authorized through [employer] through 10/10/22   Diagnosis: degenerative cervical spinal stenosis M48.02; thoracic degenerative disc disease M51.34, chronic pain syndrome G89.4; add low back M54.5   Onset Date: 12; more recent referral 22   Next  [address] [place] [address]:   Visit# / total visits:  updated  Cancels/No Shows: 0/1    Subjective:  Patient notes continued significant complaints of pain in the (R) arm, intermittent increases but severe symptoms. These have not changed significantly since onset of symptoms and onset of therapy per the patient. Pain:  [x] Yes  [] No   R arm: 10/10  Back: 7/10  Neck 7/10   Pain altered Tx:  [] No  [] Yes  Action:  Comments:    Objective:  Modalities:   Precautions:  Exercises: MHP to R shoulder this session   Exercise Reps/ Time Weight/ Level Completed  Today Comments   Mat       Abdominal bracing  15x 5\"  X    priformis 3x 30\"  X    Marching  10X2  X    clamshells 15x  X    bridges 15x  X    Hip ABD 10x2 3\" holds yellow X    Modified crunch with physio ball  15x      LTR  10x  X    Cervical ROM 10x  X With pillow   AAROM shoulder flexion 10x2 dowl tavia x With pillow   Scapular retraction- supine 15x 3\"      Chin tucks-supine 15x 3\"  x With pillow and towel roll    Flys 10x 3\"             Standing       Shoulder Iso 10x 3\"  X                  Seated       Upper trap stretch 2x 30\"  x    Levator stretch 2x 30\"  x In supine and PROM   AAROM shoulder    7/7 performed throughout session for comfort and repositioning.    Manual 10'   Pt demos improved tolerance    Cervical traction, retraction   X    UT stretch 2x 30\" P/A mobs to cervical spine 3'      Grade I-II inferior, MEET carrillo 5'      Gentle STM to [facility] and UT 2'      Gentle shoulder traction 5'      Trigger point release to RUT 8'      Pec release to R side 3'      Other:      Specific Instructions for next treatment:      Assessment: [] Progressing toward goals. [] No change. [x] Other:    [x] Patient would continue to benefit from skilled physical therapy services in order to: improve balance, functional tolerance of ambulation, standing activities. Worked on neck mobility exercises within patient tolerance, worked on LE strengthening exercises within tolerance. Traction completed today and retraction without change of symptoms, neither increasing or decreasing symptoms which he did have in the (R) arm entering the clinic today. Goals  Short term goals  Time Frame for Short term goals: 6 visits  Short term goal 1: Improved balance and able to do Tandem balance up to 5 seconds  Short term goal 2: LE strength testing to 4/5 for (R) LE strength testing  Short term goal 3: IND and compliant with HEP  Short term goal 4: improved pain levels to 4-5/10  Short term goal 5: Improved neural tension testing grossly overall  Long term goals  Time Frame for Long term goals : 12 visits  Long term goal 1: 1/10 pain  Long term goal 2: 4+/5 LE strength testing  Long term goal 3: Normalized cervical ROM  Long term goal 4: End range limitation with neural tension testing only. Pt. Education:  [x] Yes  [] No  [] Reviewed Prior HEP/Ed  Method of Education: [x] Verbal  [] Demo  [x] Written  Comprehension of Education:  [x] Verbalizes understanding. [] Demonstrates understanding. [] Needs review. [] Demonstrates/verbalizes HEP/Ed previously given. Plan: [x] Continue per plan of care.    [] Other:      Treatment Charges: Mins Units   []  Modalities     [x]  Ther Exercise 30 2   [x]  Manual Therapy 10 1   []  Ther Activities     []  Aquatics     []  Neuromuscular [] Vasocompression     [] Gait Training     [] Dry needling        [] 1 or 2 muscles        [] 3 or more muscles     []  Other     Total Treatment time 40 3     Time In: 1888         Time Out: 1550    Electronically signed by:  Brooklyn Donahue PT

## 2022-07-19 ENCOUNTER — HOSPITAL ENCOUNTER (OUTPATIENT)
Dept: PHYSICAL THERAPY | Age: 62
Setting detail: THERAPIES SERIES
Discharge: HOME | End: 2022-07-19
Payer: MEDICARE

## 2022-07-19 PROCEDURE — 97140 MANUAL THERAPY 1/> REGIONS: CPT

## 2022-07-19 PROCEDURE — 97110 THERAPEUTIC EXERCISES: CPT

## 2022-07-19 NOTE — FLOWSHEET NOTE
comfort and repositioning. Manual 10'   Pt demos improved tolerance    Cervical traction   X    UT stretch 2x 30\"  X    P/A mobs to cervical spine 3'      Grade I-II inferior, PA glides 5'      Gentle STM to [facility] and UT 5'  X    Gentle shoulder traction 5'      Trigger point release to RUT 3'  X    Pec release to R side 3'      Other:      Specific Instructions for next treatment:      Assessment: [] Progressing toward goals. [] No change. [x] Other:    [x] Patient would continue to benefit from skilled physical therapy services in order to: improve balance, functional tolerance of ambulation, standing activities. 7/19: Began session with manual STM/MFR to R UT and R cervical paraspinals to decrease tightness and improve cervical mobility. Patient zulema improved tolerance to [facility] with pec release and MFR to biceps attachment proximally. Progressed mat level exercises with TB with marching in supine and progressing to red TB with hip ABD without difficulty or inc in pain. Goals  Short term goals  Time Frame for Short term goals: 6 visits  Short term goal 1: Improved balance and able to do Tandem balance up to 5 seconds  Short term goal 2: LE strength testing to 4/5 for (R) LE strength testing  Short term goal 3: IND and compliant with HEP  Short term goal 4: improved pain levels to 4-5/10  Short term goal 5: Improved neural tension testing grossly overall  Long term goals  Time Frame for Long term goals : 12 visits  Long term goal 1: 1/10 pain  Long term goal 2: 4+/5 LE strength testing  Long term goal 3: Normalized cervical ROM  Long term goal 4: End range limitation with neural tension testing only. Pt. Education:  [x] Yes  [] No  [] Reviewed Prior HEP/Ed  Method of Education: [x] Verbal  [] Demo  [x] Written  Comprehension of Education:  [x] Verbalizes understanding. [] Demonstrates understanding. [] Needs review. [] Demonstrates/verbalizes HEP/Ed previously given.      Plan: [x] Continue per plan of care.    [] Other:      Treatment Charges: Mins Units   []  Modalities     [x]  Ther Exercise 31 2   [x]  Manual Therapy 10 1   []  Ther Activities     []  Aquatics     []  Neuromuscular     [] Vasocompression     [] Gait Training     [] Dry needling        [] 1 or 2 muscles        [] 3 or more muscles     []  Other     Total Treatment time 41 3     Time In: 0900      Time Out: 3671    Electronically signed by:  Anabel Ortez PTA

## 2022-07-21 ENCOUNTER — HOSPITAL ENCOUNTER (OUTPATIENT)
Dept: PHYSICAL THERAPY | Age: 62
Setting detail: THERAPIES SERIES
Discharge: HOME | End: 2022-07-21
Payer: MEDICARE

## 2022-07-21 PROCEDURE — 97140 MANUAL THERAPY 1/> REGIONS: CPT

## 2022-07-21 PROCEDURE — 97110 THERAPEUTIC EXERCISES: CPT

## 2022-07-21 NOTE — FLOWSHEET NOTE
509 [facility]   [address]   Phone: [phone]   Fax: [phone]    Physical Therapy Daily Treatment Note      Date:  2022   Patient Name:  Kelsi Chand    :  1960  MRN: [id number]  Physician: Rico Rowell CNP     Insurance: Medicare/Caid  MC eval, then auth through AIM 30 total authorized through [employer] through 10/10/22   Diagnosis: degenerative cervical spinal stenosis M48.02; thoracic degenerative disc disease M51.34, chronic pain syndrome G89.4; add low back M54.5   Onset Date: 12; more recent referral 22   Next  [address] [facility] [address] [facility] [address]:   Visit# / total visits:  updated  Cancels/No Shows: 0/1    Subjective:    Patient arrives noting pain in B sides of neck but R>L leading into R shoulder with difficulty during OH reaching. Patient notes lower back is always in pain as well making it difficult to walk. Pain:  [x] Yes  [] No   R arm: 810  Back: 4/10  Neck 710   Pain altered Tx:  [] No  [] Yes  Action:  Comments:    Objective:  Modalities:   Precautions:  Exercises: MHP to R shoulder this session   Exercise Reps/ Time Weight/ Level Completed  Today Comments   Mat       Abdominal bracing  15x 5\"      priformis 3x 30\"      Marching  10X2 red X  added TB   clamshells 15x      bridges 15x  X    Hip ABD 10x2 3\" holds red X    Modified crunch with physio ball  15x      SLR 10x ea  X     LTR  10x 3\" holds  X    Cervical ROM 10x  X With pillow   AAROM shoulder flexion 10x2 dowl tavia X With pillow; improved tolerance noted post MFR to pec   Scapular retraction- supine 15x 3\"  x    Chin tucks-supine 15x 3\"  X With pillow and towel roll    Flys 10x 3\"  x           Standing       Shoulder Iso 10x 3\"                    Seated       Upper trap stretch 2x 30\"      Levator stretch 2x 30\"   In supine and PROM   AAROM shoulder    7/ performed throughout session for comfort and repositioning.    Manual 10'   Pt demos improved tolerance Cervical traction   X    UT stretch 2x 30\"  X    P/A mobs to cervical spine 3'      Grade I-II inferior, MEET carrillo 5'      Gentle STM to [facility] and UT 5'  X    Gentle shoulder traction 5'      Trigger point release to RUT 3'  X    Pec release to R side 3'      Other:      Specific Instructions for next treatment:      Assessment: [] Progressing toward goals. [] No change. [x] Other:    [x] Patient would continue to benefit from skilled physical therapy services in order to: improve balance, functional tolerance of ambulation, standing activities. 7/21: continued with exercise to address ROM, and strength deficits in Cervical region,R shoulder, and LB/LE's. Patient requires frequent cues to remain on task during therapy as he tends to get distracted by conversation. Patient required cueing for core engagement with bridge and SLR this date. Pain with horizontal abduction in supine but became easier as he continues with repetitions. Goals  Short term goals  Time Frame for Short term goals: 6 visits  Short term goal 1: Improved balance and able to do Tandem balance up to 5 seconds  Short term goal 2: LE strength testing to 4/5 for (R) LE strength testing  Short term goal 3: IND and compliant with HEP  Short term goal 4: improved pain levels to 4-5/10  Short term goal 5: Improved neural tension testing grossly overall  Long term goals  Time Frame for Long term goals : 12 visits  Long term goal 1: 1/10 pain  Long term goal 2: 4+/5 LE strength testing  Long term goal 3: Normalized cervical ROM  Long term goal 4: End range limitation with neural tension testing only. Pt. Education:  [x] Yes  [] No  [] Reviewed Prior HEP/Ed  Method of Education: [x] Verbal  [] Demo  [x] Written  Comprehension of Education:  [x] Verbalizes understanding. [] Demonstrates understanding. [] Needs review. [] Demonstrates/verbalizes HEP/Ed previously given. Plan: [x] Continue per plan of care.    [] Other:      Treatment Charges: Mins Units   []  Modalities     [x]  Ther Exercise 31 2   [x]  Manual Therapy 10 1   []  Ther Activities     []  Aquatics     []  Neuromuscular     [] Vasocompression     [] Gait Training     [] Dry needling        [] 1 or 2 muscles        [] 3 or more muscles     []  Other     Total Treatment time 41 3     Time In: 0801      Time Out: 1551    Electronically signed by:  Terrie Lagunas PTA

## 2022-07-26 ENCOUNTER — HOSPITAL ENCOUNTER (OUTPATIENT)
Dept: PHYSICAL THERAPY | Age: 62
Setting detail: THERAPIES SERIES
Discharge: HOME | End: 2022-07-26
Payer: MEDICARE

## 2022-07-26 NOTE — FLOWSHEET NOTE
[x] [facility]) - [facility] & Therapy  [address]  [place]: [phone]   F: [phone]     Physical Therapy Cancel/No Show note    Date: 2022  Patient: Jamia Engle  : 1960  MRN: [id number]    Visit Count: 15 /22  Cancels/No Shows to date:     For today's appointment patient:    [x]  Cancelled    [] Rescheduled appointment    [] No-show     Reason given by patient:    []  Patient ill    []  Conflicting appointment    [] No transportation      [] Conflict with work    [x] No reason given- per , pt called to cx his appt stating he isn't able to make it this morning.    [] Weather related    [] QOVOK-    [] Other:      Comments:        [] Next appointment was confirmed    Electronically signed by: Ranjan Youngblood PTA
[phone]

## 2022-07-28 ENCOUNTER — HOSPITAL ENCOUNTER (OUTPATIENT)
Dept: PHYSICAL THERAPY | Age: 62
Setting detail: THERAPIES SERIES
Discharge: HOME | End: 2022-07-28
Payer: MEDICARE

## 2022-07-28 PROCEDURE — 97140 MANUAL THERAPY 1/> REGIONS: CPT

## 2022-07-28 PROCEDURE — 97110 THERAPEUTIC EXERCISES: CPT

## 2022-07-28 NOTE — FLOWSHEET NOTE
509 [facility] Therapy   [address]   Phone: [phone]   Fax: [phone]    Physical Therapy Daily Treatment Note      Date:  2022   Patient Name:  Misha Rodriguez    :  1960  MRN: [id number]  Physician: Demarcus Holley CNP     Insurance: Medicare/Caid  MC eval, then auth through AIM 30 total authorized through [employer] through 10/10/22   Diagnosis: degenerative cervical spinal stenosis M48.02; thoracic degenerative disc disease M51.34, chronic pain syndrome G89.4; add low back M54.5   Onset Date: 12; more recent referral 22   Next Dr. Garg Dadds:   Visit# / total visits: 15/22 updated  Cancels/No Shows:     Subjective:    Patient with severe pain and very frustrated with symptoms. States that he feels like all of his symptoms started after his blood transfusion with heart surgery and that he has had whole body pain since that time. Noted that he has had severe whole body pain since that time and that there was an issue with the type of hardware that he received. Symptoms include neck pain, (R) arm symptoms, back pain, and then (B) leg symptoms (R) more than (L).    Pain:  [x] Yes  [] No   R arm: 8/10  Back: 4/10  Neck 7/10   Pain altered Tx:  [] No  [] Yes  Action:  Comments:    Objective:  Modalities:   Precautions:  Exercises:   Exercise Reps/ Time Weight/ Level Completed  Today Comments   Mat       SKTC 10 x 5\"  X    Abdominal bracing  15x 5\"  X    priformis 3x 30\"      Marching  10X2  X Previously used RED TB   clamshells 15x      bridges 15x  X    Hip ABD 10x2 3\" holds red X    Modified crunch with physio ball  15x      SLR 10x ea  X     LTR  10x 3\" holds  X    Cervical ROM 10x  X With pillow   AAROM shoulder flexion 10x2 dowl tavia X With pillow; improved tolerance noted post MFR to pec   Scapular retraction- supine 15x 3\"  x    Chin tucks-supine 15x 3\"  X With pillow and towel roll    Flys 10x 3\"             Standing       Shoulder Iso 10x 3\"      TBAND rows 15 red X    TBAND extension 15 red X    Seated       Upper trap stretch 2x 30\"      Levator stretch 2x 30\"   In supine and PROM   AAROM shoulder    7/7 performed throughout session for comfort and repositioning. Manual 10'   Pt demos improved tolerance    Cervical traction   X    UT stretch 2x 30\"  X    P/A mobs to cervical spine 3'      Grade I-II inferior, PA glides 5'      Gentle STM to [facility] and UT 5'  X    Gentle shoulder traction 5'      Trigger point release to RUT 3'  X    Pec release to R side 3'      Other:      Specific Instructions for next treatment:      Assessment: [] Progressing toward goals. [] No change. [x] Other:    [x] Patient would continue to benefit from skilled physical therapy services in order to: improve balance, functional tolerance of ambulation, standing activities. 7/28: Working on general lumbar mobility program ,cervical ROM program, and stabilization. Had significant (L) sided groin pain with SKTC today. Goals  Short term goals  Time Frame for Short term goals: 6 visits  Short term goal 1: Improved balance and able to do Tandem balance up to 5 seconds  Short term goal 2: LE strength testing to 4/5 for (R) LE strength testing  Short term goal 3: IND and compliant with HEP  Short term goal 4: improved pain levels to 4-5/10  Short term goal 5: Improved neural tension testing grossly overall  Long term goals  Time Frame for Long term goals : 12 visits  Long term goal 1: 1/10 pain  Long term goal 2: 4+/5 LE strength testing  Long term goal 3: Normalized cervical ROM  Long term goal 4: End range limitation with neural tension testing only. Pt. Education:  [x] Yes  [] No  [] Reviewed Prior HEP/Ed  Method of Education: [x] Verbal  [] Demo  [x] Written  Comprehension of Education:  [x] Verbalizes understanding. [] Demonstrates understanding. [] Needs review. [] Demonstrates/verbalizes HEP/Ed previously given.      Plan: [x] Continue per plan of care.    [] Other:      Treatment Charges: Mins Units   []  Modalities     [x]  Ther Exercise 35 2   [x]  Manual Therapy 10 1   []  Ther Activities     []  Aquatics     []  Neuromuscular     [] Vasocompression     [] Gait Training     [] Dry needling        [] 1 or 2 muscles        [] 3 or more muscles     []  Other     Total Treatment time 45 3     Time In: 0845    Time Out: 0930    Electronically signed by:  Andrew Carlson PT

## 2022-08-02 ENCOUNTER — HOSPITAL ENCOUNTER (OUTPATIENT)
Dept: PHYSICAL THERAPY | Age: 62
Setting detail: THERAPIES SERIES
Discharge: HOME | End: 2022-08-02
Payer: MEDICARE

## 2022-08-02 PROCEDURE — 97110 THERAPEUTIC EXERCISES: CPT

## 2022-08-02 PROCEDURE — 97140 MANUAL THERAPY 1/> REGIONS: CPT

## 2022-08-02 NOTE — FLOWSHEET NOTE
509 [facility]   [address]   Phone: [phone]   Fax: [phone]    Physical Therapy Daily Treatment Note      Date:  2022   Patient Name:  Tao Carl    :  1960  MRN: [id number]  Physician: Sirisha Xiao CNP     Insurance: Medicare/Caid  MC eval, then auth through AIM 30 total authorized through [employer] through 10/10/22   Diagnosis: degenerative cervical spinal stenosis M48.02; thoracic degenerative disc disease M51.34, chronic pain syndrome G89.4; add low back M54.5   Onset Date: 12; more recent referral 22   Next  [facility]:   Visit# / total visits:  (corrected count 22)  Cancels/No Shows:     Subjective:    Patient reports increased pain in posterior L UE feeling like needles hitting the L arm. Patient continues to report pain with mobility in R UE. Patient reporting pain in scalenes and states he continues to have difficulty with lifting a container to take a drink with the RUE.     Pain:  [x] Yes  [] No   R arm: 8-9/10  Back: 4-5/10  Neck 7/10   Pain altered Tx:  [] No  [] Yes  Action:  Comments:    Objective:  Modalities:   Precautions:  Exercises:   Exercise Reps/ Time Weight/ Level Completed  Today Comments   Mat       SKTC 10\"x5  X    Abdominal bracing  15x 5\"      priformis 3x 30\"      Marching  10X2 Red TB X    clamshells 15x      bridges 15x 3\"  X    Hip ABD 10x2 3\" holds red X    Modified crunch with physio ball  15x      SLR 10x ea  X     LTR  10x 3\" holds  X    Cervical ROM 10x  X With pillow   AAROM shoulder flexion 10x2 dowl tavia X With pillow; improved tolerance noted post MFR to pec   Scapular retraction- supine 15x 3\"      Chin tucks-supine 15x 3\"  X With pillow and towel roll    Flys 10x 3\"             Standing       Shoulder Iso 10x 3\"      TBAND rows 15 red X    TBAND extension 15 red X    Seated       Upper trap stretch 2x 30\"      Levator stretch 2x 30\"   In supine and PROM   AAROM shoulder    7/7 performed throughout session for comfort and repositioning. Manual 10'   Pt demos improved tolerance    Cervical traction   X    UT stretch 2x 30\"  X    P/A mobs to cervical spine 3'      Grade I-II inferior, PA glides 5'      Gentle STM to [facility] and UT 5'  X    Gentle shoulder traction 5'      Trigger point release to RUT 3'  X    Pec release to R side 3'      Other:      Specific Instructions for next treatment:       Assessment: [x] Progressing toward goals. Patient was able to complete charted exercises above without increase in pain or difficulty noted. Patient demos improved tolerance to manual to R UT and R cervical paraspinals with occasional radiating pain noted. Will continue with progressing cervical endurance and core strengthening to patient's tolerance. [] No change. [] Other:    [x] Patient would continue to benefit from skilled physical therapy services in order to: improve balance, functional tolerance of ambulation, standing activities. Goals  Short term goals  Time Frame for Short term goals: 6 visits  Short term goal 1: Improved balance and able to do Tandem balance up to 5 seconds  Short term goal 2: LE strength testing to 4/5 for (R) LE strength testing  Short term goal 3: IND and compliant with HEP  Short term goal 4: improved pain levels to 4-5/10  Short term goal 5: Improved neural tension testing grossly overall  Long term goals  Time Frame for Long term goals : 12 visits  Long term goal 1: 1/10 pain  Long term goal 2: 4+/5 LE strength testing  Long term goal 3: Normalized cervical ROM  Long term goal 4: End range limitation with neural tension testing only. Pt. Education:  [x] Yes  [] No  [] Reviewed Prior HEP/Ed  Method of Education: [x] Verbal  [] Demo  [x] Written  Comprehension of Education:  [x] Verbalizes understanding. [] Demonstrates understanding. [] Needs review. [] Demonstrates/verbalizes HEP/Ed previously given.      Plan: [x] Continue per plan of care.    [] Other:      Treatment Charges: Mins Units   []  Modalities     [x]  Ther Exercise 33 2   [x]  Manual Therapy 10 1   []  Ther Activities     []  Aquatics     []  Neuromuscular     [] Vasocompression     [] Gait Training     [] Dry needling        [] 1 or 2 muscles        [] 3 or more muscles     []  Other     Total Treatment time 43 3     Time In: 0730    Time Out: 0813    Electronically signed by:  Faraz Garcia PTA

## 2022-08-09 ENCOUNTER — HOSPITAL ENCOUNTER (OUTPATIENT)
Dept: PHYSICAL THERAPY | Age: 62
Setting detail: THERAPIES SERIES
Discharge: HOME | End: 2022-08-09
Payer: MEDICARE

## 2022-08-09 NOTE — FLOWSHEET NOTE
509 [facility] Out[facility] Physical Therapy   [address]   Phone: [phone]   Fax: [phone]    Physical Therapy Daily Treatment Note      Date:  2022   Patient Name:  John Leo    :  1960  MRN: [id number]  Physician: Cathleen Ramey CNP     Insurance: Medicare/Caid  MC eval, then auth through AIM 29 (28+ EVAL) total authorized through [employer] through 10/10/22     Current Auth: [id number]; [id number]; [id number]  18 visits 22-10/10/22  Initial Auth -22 (10 used + Eval= 11 TOTAL)    Diagnosis: degenerative cervical spinal stenosis M48.02; thoracic degenerative disc disease M51.34, chronic pain syndrome G89.4;  low back M54.5   Onset Date: 12; more recent referral 22   Next Dr. Gustavo Lutz [place]:   Visit# / total visits:  on Auth  ( on current auth as of 22 )  Cancels/No Shows:     Subjective:  Reports (B) groin pain; mid to lower back pain not as bad today due to neck pain being intense. Just took pain meds prior to PT. States pain continues with neck radiating into (B) arms with N/T into hands. States he has not addressed R shoulder since he \"tore\" RTC in July- just self treating. Feels right LE feels stronger since starting therapy- feels he is \"dragging\" it less. Has difficulty sleeping due to pain- feels he gets 6 hours of sleep out of a 24 hour day. Unable to sleep on sides due to shoulder pain. Pain:  [x] Yes  [] No   Posterior neck, (B) UEs with N/T into B hands.  R shoulder more painful than R  /10  Mid to lower back 5/10  Pain altered Tx:  [x] No  [] Yes  Action:    Comments:    Objective:  Modalities:   Precautions: Fall Risk- use of cane due to R LE weakness  Exercises:   Exercise Reps/ Time Weight/ Level Completed  08/10/22 Comments   Mat       SKTC 10\"x5  X           priformis 3x 30\"      Marching  10X2 Red TB X           Bridges 15x 3\"  X    Hip ABD 10x2 3\" holds red     Modified crunch with physio ball  15x      SLR 10x ea  X LTR  10x 3\" holds  X           Side Lying Clams 10x each  X    Side Lying Reverse Clams 10x each  x    Side Lying Hip Abd                            Cervical ROM seated 6-way 10x  X    AAROM shoulder flexion 10x2 dowl tavia X With pillow; improved tolerance noted post MFR to pec   Scapular retraction- supine 15x 3\"      Chin tucks-supine 15x 3\"  X With pillow and towel roll    Flys 10x 3\"             Standing       Shoulder Iso 10x 3\"      TBAND rows 2x10 red X    TBAND extension 2x10 red X    Seated       Upper trap stretch 2x 30\"  x    Levator stretch 2x 30\"      AAROM shoulder              Manual 12' total      SOR; Cervical traction X  X    UT stretch 2x 30\"      Supine A/P mobs to cervical spine X  X    Grade I-II inferior, PA glides 5'      Gentle STM to [facility] and UT 5'      Gentle shoulder traction 5'      Trigger point release to B UT X  x    (B) Per Minor MFR X  X    Other:    Specific Instructions for next treatment: Progress as able with scapular stability and improved postural awareness. Progress core exercise as able      Assessment: [x] Progressing toward goals. COmpleted program as noted above. Added side lying proximal hip activity to assist with trunk stability- great difficulty with all exercise R LE > L LE. Reviewed activation of core stabilizers and encouraged proper technique with program. Patient requires close CGA with standing activity due to imbalance. Moderate tightness noted in (B) upper trap and sub occipital region during manual- patient noted significant tenderness at (B) upper trap. Continued with MFR to (B) pecs due to rounded shoulders/forward head. Patient noted overall fatigue and soreness post treatment but reports neck felt less painful after manual.    [] No change. [] Other:    [x] Patient would continue to benefit from skilled physical therapy services in order to: improve balance, functional tolerance of ambulation, standing activities.          Goals  Short term goals  Time Frame for Short term goals: 6 visits  Short term goal 1: Improved balance and able to do Tandem balance up to 5 seconds  Short term goal 2: LE strength testing to 4/5 for (R) LE strength testing  Short term goal 3: IND and compliant with HEP  Short term goal 4: improved pain levels to 4-5/10  Short term goal 5: Improved neural tension testing grossly overall  Long term goals  Time Frame for Long term goals : 12 visits  Long term goal 1: 1/10 pain  Long term goal 2: 4+/5 LE strength testing  Long term goal 3: Normalized cervical ROM  Long term goal 4: End range limitation with neural tension testing only. Pt. Education:  [x] Yes  [] No  [] Reviewed Prior HEP/Ed  Method of Education: [x] Verbal  [] Demo  [x] Written  Comprehension of Education:  [x] Verbalizes understanding. [] Demonstrates understanding. [] Needs review. [] Demonstrates/verbalizes HEP/Ed previously given. Plan: [x] Continue per plan of care.    [] Other:      Treatment Charges: Mins Units   []  Modalities     [x]  Ther Exercise 35 2   [x]  Manual Therapy 12 1   []  Ther Activities     []  Aquatics     []  Neuromuscular     [] Vasocompression     [] Gait Training     [] Dry needling        [] 1 or 2 muscles        [] 3 or more muscles     []  Other     Total Treatment time 47 3     Time In: 327 AM    Time Out: 810 AM    Electronically signed by:  Blima Crigler, PTA

## 2022-08-09 NOTE — PROGRESS NOTES
[] [facility]) - [facility] & Therapy  [address]  [place]: [phone]   F: [phone]     Physical Therapy Cancel/No Show note    Date: 2022  Patient: Rupa Munoz  : 1960  MRN: [id number]    Visit Count:    Cancels/No Shows to date:     For today's appointment patient:    [x]  Cancelled    [] Rescheduled appointment    [] No-show     Reason given by patient:    []  Patient ill    []  Conflicting appointment    [] No transportation      [] Conflict with work    [] No reason given    [] Weather related    [] COVID-19    [x] Other:      Comments:overslept         [x] Next appointment was confirmed    Electronically signed by: Justin Horne PTA

## 2022-08-10 ENCOUNTER — HOSPITAL ENCOUNTER (OUTPATIENT)
Dept: PHYSICAL THERAPY | Age: 62
Setting detail: THERAPIES SERIES
Discharge: HOME | End: 2022-08-10
Payer: MEDICARE

## 2022-08-10 PROCEDURE — 97140 MANUAL THERAPY 1/> REGIONS: CPT

## 2022-08-10 PROCEDURE — 97110 THERAPEUTIC EXERCISES: CPT

## 2022-08-16 ENCOUNTER — HOSPITAL ENCOUNTER (OUTPATIENT)
Dept: PHYSICAL THERAPY | Age: 62
Setting detail: THERAPIES SERIES
Discharge: HOME | End: 2022-08-16
Payer: MEDICARE

## 2022-08-16 PROCEDURE — 97140 MANUAL THERAPY 1/> REGIONS: CPT

## 2022-08-16 PROCEDURE — 97110 THERAPEUTIC EXERCISES: CPT

## 2022-08-16 NOTE — FLOWSHEET NOTE
509 [facility] Outpatient Physical Therapy   [address]   Phone: [phone]   Fax: [phone]    Physical Therapy Daily Treatment Note      Date:  2022   Patient Name:  Camille West    :  1960  MRN: [id number]  Physician: Karen Bonilla CNP     Insurance: Medicare/Caid  MC eval, then auth through AIM 29 (28+ EVAL) total authorized through [employer]ace through 10/10/22     Current Auth: [id number]; [phone]; [phone]  18 visits 22-10/10/22  Initial Auth -22 (10 used + Eval= 11 TOTAL)  Diagnosis: degenerative cervical spinal stenosis M48.02; thoracic degenerative disc disease M51.34, chronic pain syndrome G89.4;  low back M54.5   Onset Date: 12; more recent referral 22   Next  [facility]:   Visit# / total visits:  on Auth  ( on current auth as of 22 )  Cancels/No Shows:     Subjective:  Patient states he is doing well as far as improved tolerance to activity and ROM on RUE but continues to have pain with prolonged activity OH.       Pain:  [x] Yes  [] No   6/10- R shoulder  Pain altered Tx:  [x] No  [] Yes  Action:    Comments:    Objective:  Modalities:   Precautions: Fall Risk- use of cane due to R LE weakness  Exercises:   Exercise Reps/ Time Weight/ Level Completed  22 Comments   Mat       SKTC 10\"x5  X           Piriformis 3x 30\"      Marching  10X2 Red TB X           Bridges 15x 3\"  X    Hip ABD 10x2 3\" holds red     Modified crunch with physio ball  15x      SLR 10x ea      LTR  10x 3\" holds  X           Side Lying Clams 10x each      Side Lying Reverse Clams 10x each      Side Lying Hip Abd       Cervical ROM seated 6-way 10x      AAROM shoulder flexion 10x2 dowl tavia X With pillow; improved tolerance noted post MFR to pec   Scapular retraction- supine 15x 3\"      Chin tucks-supine 15x 3\"  X With pillow and towel roll    Flys 10x 3\"             Standing       Shoulder Iso 10x 3\"      TBAND rows 2x10 red     TBAND extension 2x10 red Seated       Upper trap stretch 2x 30\"      Levator stretch 2x 30\"      AAROM shoulder       Pulley flexion/scap 2'/2'  X 8/16: Added    UBE 2'/2'      Manual 10' total      SOR; Cervical traction X  X    UT stretch 2x 30\"      Supine A/P mobs to cervical spine X  X    Grade I-II inferior, PA glides 5'      Gentle STM to [facility] and UT 5'      Gentle shoulder traction 5'      Trigger point release to B UT X  X    (B) Pec Minor MFR X  X    Other:    Specific Instructions for next treatment: Progress as able with scapular stability and improved postural awareness. Progress core exercise as able      Assessment: [x] Progressing toward goals. Added pulley and UBE this session to progress ROM and tolerance to activity. Continued with ROM and core/hip strengthening this session with patient demonstrating improved tolerance to activity without exacerbating pain. Pt continues to note pain with shoulder AROM at end range. [] No change. [] Other:    [x] Patient would continue to benefit from skilled physical therapy services in order to: improve balance, functional tolerance of ambulation, standing activities. Goals  Short term goals  Time Frame for Short term goals: 6 visits  Short term goal 1: Improved balance and able to do Tandem balance up to 5 seconds  Short term goal 2: LE strength testing to 4/5 for (R) LE strength testing  Short term goal 3: IND and compliant with HEP  Short term goal 4: improved pain levels to 4-5/10  Short term goal 5: Improved neural tension testing grossly overall  Long term goals  Time Frame for Long term goals : 12 visits  Long term goal 1: 1/10 pain  Long term goal 2: 4+/5 LE strength testing  Long term goal 3: Normalized cervical ROM  Long term goal 4: End range limitation with neural tension testing only.     Pt. Education:  [x] Yes  [] No  [] Reviewed Prior HEP/Ed  Method of Education: [x] Verbal  [] Demo  [x] Written  Comprehension of Education:  [x] Alex understanding. [] Demonstrates understanding. [] Needs review. [] Demonstrates/verbalizes HEP/Ed previously given. Plan: [x] Continue per plan of care.    [] Other:      Treatment Charges: Mins Units   []  Modalities     [x]  Ther Exercise 32 2   [x]  Manual Therapy 10 1   []  Ther Activities     []  Aquatics     []  Neuromuscular     [] Vasocompression     [] Gait Training     [] Dry needling        [] 1 or 2 muscles        [] 3 or more muscles     []  Other     Total Treatment time 42 3     Time In: 0914    Time Out: 7686    Electronically signed by:  Hieu Nichols PTA

## 2022-08-18 ENCOUNTER — HOSPITAL ENCOUNTER (OUTPATIENT)
Dept: PHYSICAL THERAPY | Age: 62
Setting detail: THERAPIES SERIES
Discharge: HOME | End: 2022-08-18
Payer: MEDICARE

## 2022-08-18 NOTE — PROGRESS NOTES
[facility] Outpatient Physical Therapy              [address]              Phone: [phone]              Fax: [phone]     Physical Therapy CXL/NS        Date:  2022   Patient Name:  Mehdi Bowen                       :  1960                     MRN: [id number]  Physician: Sherrell Crump CNP                           Insurance: Medicare/Caid  MC eval, then auth through AIM 29 (28+ EVAL) total authorized through [employer]ace through 10/10/22     Current Auth: [id number]; [phone]; [phone]  18 visits 22-10/10/22  Initial Auth -22 (10 used + Eval= 11 TOTAL)  Diagnosis: degenerative cervical spinal stenosis M48.02; thoracic degenerative disc disease M51.34, chronic pain syndrome G89.4;  low back M54.5     Onset Date: 12; more recent referral 22                         Next Dr. Adames Pages:  Visit# / total visits:  on Auth  ( on current auth as of 22 )  Cancels/No Shows: 2/    Cancelled today, no reason given per - rescheduled to next week.     Mark Esparza, PT

## 2022-08-22 ENCOUNTER — HOSPITAL ENCOUNTER (OUTPATIENT)
Dept: PHYSICAL THERAPY | Age: 62
Setting detail: THERAPIES SERIES
Discharge: HOME | End: 2022-08-22
Payer: MEDICARE

## 2022-08-22 PROCEDURE — 97140 MANUAL THERAPY 1/> REGIONS: CPT

## 2022-08-22 PROCEDURE — 97110 THERAPEUTIC EXERCISES: CPT

## 2022-08-23 NOTE — FLOWSHEET NOTE
509 [facility] of [facility] Outpatient Physical Therapy   [facility]   Phone: [phone]   Fax: [phone]    Physical Therapy Daily Treatment Note      Date:  2022   Patient Name:  Mee Nielsen    :  1960  MRN: [id number]  Physician: Elisa Cook CNP     Insurance: Medicare/Caid  MC eval, then auth through AIM 29 (28+ EVAL) total authorized through [employer]urnace through 10/10/22     Current Auth: [id number]; [id number]; [phone]  18 visits 22-10/10/22  Initial Auth -22 (10 used + Eval= 11 TOTAL)  Diagnosis: degenerative cervical spinal stenosis M48.02; thoracic degenerative disc disease M51.34, chronic pain syndrome G89.4;  low back M54.5   Onset Date: 12; more recent referral 22   Next [facility]:   Visit# / total visits:  on Auth  ( on current auth as of 22 )  Cancels/No Shows: 2    Subjective:  Patient with better low back and hip pain and better (L) sided arm pain. Irritated his (R) arm and is having difficulty with overhead reaching actively. This is getting better overall into the flexion and abduction motions but states he is having significant issues with reaching across his body.   Stated he is just sick of \"whole body pain\"      Pain:  [x] Yes  [] No   8/10- R shoulder  Pain altered Tx:  [x] No  [] Yes  Action:    Comments:    Objective:  Modalities:   Precautions: Fall Risk- use of cane due to R LE weakness  Exercises:   Exercise Reps/ Time Weight/ Level Completed  22 Comments   Mat       SKTC 10\"x5  X           Piriformis 3x 30\"      Marching  10X2 Red TB X           Bridges 15x 3\"  X    Hip ABD 10x2 3\" holds red     Modified crunch with physio ball  15x      SLR 10x ea      LTR  10x 3\" holds  X           Side Lying Clams 10x each  X    Side Lying Reverse Clams 10x each  X    Side Lying Hip Abd 10x  X    Cervical ROM supine (B) SB, (B) rotation 10x5\"  X    AAROM shoulder flexion 10x2 dowl tavia X With pillow; improved tolerance noted post MFR to pec   Scapular retraction- supine 15x 3\"      Chin tucks-supine 15x 3\"  X With pillow and towel roll    Flys 10x 3\"             Standing       Shoulder Iso 10x 3\"      TBAND rows 2x10 red     TBAND extension 2x10 red     Seated       Upper trap stretch 2x 30\"      Levator stretch 2x 30\"      AAROM shoulder       Pulley flexion/scap 2'/2'  X    UBE 2'/2'      Manual 10' total      SOR; Cervical traction X  X    UT stretch 2x 30\"      Supine A/P mobs to cervical spine X  X    Grade I-II inferior, PA glides 5'  X    Gentle STM to SCM and UT 5'      Gentle shoulder traction 5'      Trigger point release to B UT X      (B) Pec Minor MFR X      Other:    Specific Instructions for next treatment: Progress as able with scapular stability and improved postural awareness. Progress core exercise as able      Assessment: [x] Progressing toward goals. Working on arm use, working on cervical exercises to improve radicular arm pain. More recently added shoulder AROM to help with active elevation of the shoulder. Worked on additional trunk strengthening exercises to help with patient being more stable during gait and help with scissoring gait. [] No change. [] Other:    [x] Patient would continue to benefit from skilled physical therapy services in order to: improve balance, functional tolerance of ambulation, standing activities.          Goals  Short term goals  Time Frame for Short term goals: 6 visits  Short term goal 1: Improved balance and able to do Tandem balance up to 5 seconds  Short term goal 2: LE strength testing to 4/5 for (R) LE strength testing  Short term goal 3: IND and compliant with HEP  Short term goal 4: improved pain levels to 4-5/10  Short term goal 5: Improved neural tension testing grossly overall  Long term goals  Time Frame for Long term goals : 12 visits  Long term goal 1: 1/10 pain  Long term goal 2: 4+/5 LE strength testing  Long term goal 3: Normalized cervical ROM  Long term goal 4: End range limitation with neural tension testing only. Pt. Education:  [x] Yes  [] No  [] Reviewed Prior HEP/Ed  Method of Education: [x] Verbal  [] Demo  [x] Written  Comprehension of Education:  [x] Verbalizes understanding. [] Demonstrates understanding. [] Needs review. [] Demonstrates/verbalizes HEP/Ed previously given. Plan: [x] Continue per plan of care.    [] Other:      Treatment Charges: Mins Units   []  Modalities     [x]  Ther Exercise 35 2   [x]  Manual Therapy 10 1   []  Ther Activities     []  Aquatics     []  Neuromuscular     [] Vasocompression     [] Gait Training     [] Dry needling        [] 1 or 2 muscles        [] 3 or more muscles     []  Other     Total Treatment time 45 3     Time In: 0800   Time Out: 0845    Electronically signed by:  Kristine Moss, PT

## 2022-08-25 ENCOUNTER — HOSPITAL ENCOUNTER (OUTPATIENT)
Dept: PHYSICAL THERAPY | Age: 62
Setting detail: THERAPIES SERIES
Discharge: HOME | End: 2022-08-25
Payer: MEDICARE

## 2022-08-25 PROCEDURE — 97110 THERAPEUTIC EXERCISES: CPT

## 2022-08-25 PROCEDURE — 97140 MANUAL THERAPY 1/> REGIONS: CPT

## 2022-08-25 NOTE — FLOWSHEET NOTE
[facility]   [address] [place] [address] [address]   Phone: [phone]   Fax: [phone]    Physical Therapy Daily Treatment Note      Date:  2022   Patient Name:  Maryann Giraldo    :  1960  MRN: [id number]  Physician: Terrence Jennings CNP     Insurance: Medicare/Caid  MC eval, then auth through AIM 29 (28+ EVAL) total authorized through [employer]ace through 10/10/22     Current Auth: [id number]; [id number]; [phone]  18 visits 22-10/10/22  Initial Auth -22 (10 used + Eval= 11 TOTAL)  Diagnosis: degenerative cervical spinal stenosis M48.02; thoracic degenerative disc disease M51.34, chronic pain syndrome G89.4;  low back M54.5   Onset Date: 12; more recent referral 22   Next Dr. Christina Mcintyre:   Visit# / total visits:  on Auth  ( on current auth as of 22 )  Cancels/No Shows: 2/    Subjective:  Patient demonstrating difficulty performing motion to take drink with pt's shoulder demonstrating difficulty into shoulder flexion in neutral.     Pain:  [x] Yes  [] No   5/10- R shoulder  Body pain 7/10  Pain altered Tx:  [x] No  [] Yes  Action:    Comments:    Objective:  Modalities:   Precautions: Fall Risk- use of cane due to R LE weakness  Exercises:   Exercise Reps/ Time Weight/ Level Completed  22 Comments   Mat       SKTC 10\"x5             Piriformis 3x 30\"      Marching  10X2 Red TB            Bridges 15x 3\"      Hip ABD 10x2 3\" holds red     Modified crunch with physio ball  15x      SLR 10x ea      LTR  10x 3\" holds             Side Lying Clams 10x each      Side Lying Reverse Clams 10x each      Side Lying Hip Abd 10x      Cervical ROM supine (B) SB, (B) rotation 10x5\"      AAROM shoulder flexion 10x2 dowl tavia X With pillow; improved tolerance noted post MFR to pec   Scapular retraction- supine 15x 3\"      Chin tucks-supine 15x 3\"   With pillow and towel roll    Flys 10x 3\"             Standing       Shoulder Iso 10x 3\"      TBAND rows 2x10 red     TBAND extension 2x10 red     Seated       Upper trap stretch 2x 30\"      Levator stretch 2x 30\"      AAROM shoulder        Pulley scap/ABD 2'/2'  X 8/25 pt continues to demo difficulty and pain with shoulder ABD. UBE 2'/2'      Manual 10' total      SOR; Cervical traction X      UT stretch 2x 30\"      Supine A/P mobs to cervical spine X      Grade I-II inferior, PA glides 5'      Gentle STM to [facility] and UT 5'      Gentle shoulder traction 5'      Trigger point release to B UT X      (B) Pec Minor MFR X      Sub scapular release 5'  X    Scapular mobilization 5'  X Assistance with moving scapula as patinet performed shoulder flexion 10x   MFR to biceps tendons 5'  X    Other:    Specific Instructions for next treatment: Progress as able with scapular stability and improved postural awareness. Progress core exercise as able      Assessment: [] Progressing toward goals. [] No change. [x] Other: Focused this session scapular motion and manual to R shoulder and pec in attempts to improve mobility of shoulder. Post manual, pt demos improved tolerance to mobility in supine but when returned to [facility] with pulley patient had inc in pain with shoulder ABD. Patient requested to end session early due to increase in pain. [x] Patient would continue to benefit from skilled physical therapy services in order to: improve balance, functional tolerance of ambulation, standing activities.          Goals  Short term goals  Time Frame for Short term goals: 6 visits  Short term goal 1: Improved balance and able to do Tandem balance up to 5 seconds  Short term goal 2: LE strength testing to 4/5 for (R) LE strength testing  Short term goal 3: IND and compliant with HEP  Short term goal 4: improved pain levels to 4-5/10  Short term goal 5: Improved neural tension testing grossly overall  Long term goals  Time Frame for Long term goals : 12 visits  Long term goal 1: 1/10 pain  Long term goal 2: 4+/5 LE strength testing  Long term goal 3: Normalized cervical ROM  Long term goal 4: End range limitation with neural tension testing only. Pt. Education:  [x] Yes  [] No  [] Reviewed Prior HEP/Ed  Method of Education: [x] Verbal  [] Demo  [x] Written  Comprehension of Education:  [x] Verbalizes understanding. [] Demonstrates understanding. [] Needs review. [] Demonstrates/verbalizes HEP/Ed previously given. Plan: [x] Continue per plan of care.    [] Other:      Treatment Charges: Mins Units   []  Modalities     [x]  Ther Exercise 25 2   [x]  Manual Therapy 15 1   []  Ther Activities     []  Aquatics     []  Neuromuscular     [] Vasocompression     [] Gait Training     [] Dry needling        [] 1 or 2 muscles        [] 3 or more muscles     []  Other     Total Treatment time 40 3     Time In: 0800   Time Out: 0840    Electronically signed by:  José Manuel Ricardo PTA

## 2022-08-30 ENCOUNTER — HOSPITAL ENCOUNTER (OUTPATIENT)
Dept: PHYSICAL THERAPY | Age: 62
Setting detail: THERAPIES SERIES
Discharge: HOME | End: 2022-08-30
Payer: MEDICARE

## 2022-08-30 PROCEDURE — 97140 MANUAL THERAPY 1/> REGIONS: CPT

## 2022-08-30 PROCEDURE — 97110 THERAPEUTIC EXERCISES: CPT

## 2022-08-30 NOTE — FLOWSHEET NOTE
2x 30\"  X                         Standing       Shoulder Iso 10x 3\"      TBAND rows 2x10 red     TBAND extension 2x10 red     Seated       Upper trap stretch 2x 30\"      Levator stretch 2x 30\"      AROM shoulder  10x  X    Pulley scap/ABD 2'/2'   8/25 pt continues to demo difficulty and pain with shoulder ABD. UBE 2'/2'      Active shoulder ER 10x  X 8/30 added limited ROM   Manual 10' total      SOR; Cervical traction X      UT stretch 2x 30\"      Supine A/P mobs to cervical spine X      Grade I-II inferior, PA glides 5'      Gentle STM to [facility] and UT 5'      Gentle shoulder traction 5'      Trigger point release to B UT X      (B) Pec Minor MFR X      Sub scapular release 5'      Scapular mobilization 5'  X 8/30 added L shoulder this session due to inc in pain. MFR to biceps tendons 5'  X    Other:    Specific Instructions for next treatment: Progress as able with scapular stability and improved postural awareness. Progress core exercise as able      Assessment: [] Progressing toward goals. [x] No change. Focused this session on decreasing tightness in B shoulders and L scap as patient c/o inc pain upon arriving to therapy. Frequent cues needed to keep patient on task as he is easily distracted and required frequent rest breaks due to the need of repositioning for pain relief. Limited progress made this session as any AROM/AAROM made caused increase in pain. Patient requesting to end session early with patient stating he is \"spent\" and has a lot of work to do to day. [] Other:     [x] Patient would continue to benefit from skilled physical therapy services in order to: improve balance, functional tolerance of ambulation, standing activities.          Goals  Short term goals  Time Frame for Short term goals: 6 visits  Short term goal 1: Improved balance and able to do Tandem balance up to 5 seconds  Short term goal 2: LE strength testing to 4/5 for (R) LE strength testing  Short term goal 3: IND and compliant with HEP  Short term goal 4: improved pain levels to 4-5/10  Short term goal 5: Improved neural tension testing grossly overall  Long term goals  Time Frame for Long term goals : 12 visits  Long term goal 1: 1/10 pain  Long term goal 2: 4+/5 LE strength testing  Long term goal 3: Normalized cervical ROM  Long term goal 4: End range limitation with neural tension testing only. Pt. Education:  [x] Yes  [] No  [] Reviewed Prior HEP/Ed  Method of Education: [x] Verbal  [] Demo  [x] Written  Comprehension of Education:  [x] Verbalizes understanding. [] Demonstrates understanding. [] Needs review. [] Demonstrates/verbalizes HEP/Ed previously given. Plan: [x] Continue per plan of care.    [] Other:      Treatment Charges: Mins Units   []  Modalities     [x]  Ther Exercise 20 1   [x]  Manual Therapy 10 1   []  Ther Activities     []  Aquatics     []  Neuromuscular     [] Vasocompression     [] Gait Training     [] Dry needling        [] 1 or 2 muscles        [] 3 or more muscles     []  Other     Total Treatment time 30 2     Time In: 3231  Time Out: 1377    Electronically signed by:  Deidra Haywood PTA

## 2022-09-19 ENCOUNTER — HOSPITAL ENCOUNTER (OUTPATIENT)
Dept: PHYSICAL THERAPY | Age: 62
Setting detail: THERAPIES SERIES
Discharge: HOME | End: 2022-09-19
Payer: MEDICARE

## 2022-09-19 PROCEDURE — 97140 MANUAL THERAPY 1/> REGIONS: CPT

## 2022-09-19 PROCEDURE — 97110 THERAPEUTIC EXERCISES: CPT

## 2022-09-19 NOTE — FLOWSHEET NOTE
[address] [facility]   [id number] [facility] [address]   Phone: [phone]   Fax: [phone]    Physical Therapy Daily Treatment Note/PROGRESS NOTE/Discharge      Date:  2022  Patient Name:  Ender Suarez    :  1960  MRN: [id number]  Physician: Obi Araiza CNP                              Insurance: Medicare/Caid  [facility] eval, then auth through AIM 29 (28+ EVAL) total authorized through insurance through 10/10/22   Current Auth: [id number]; [phone]; [phone]  18 visits 22-10/10/22  Initial Auth -22 (10 used + Eval= 11 TOTAL)  Diagnosis: degenerative cervical spinal stenosis M48.02; thoracic degenerative disc disease M51.34, chronic pain syndrome G89.4;  low back M54.5     Onset Date: 12; more recent referral 22                         Next [facility]:   Visit# / total visits:  on Auth  ( on current auth as of 22 )            Cancels/No Shows: 2/3    Subjective:  Patient to discharge back to the physician at this time, is still noting significant complaints of (R) radicular arm/neck symptoms, is noting continued complaints of LE weakness and pain, and is noting pain in the (R) lateral arm and anterior arm with actively raising the arm. Shoulder symptoms started during course of therapy treatment about 2 to 2 1/2 months ago, initially patient was unable to actively lift his arm. He is now able to actively lift arm above shoulder level, but still has pain with lifting/carrying, overhead reaching. We did not formally treat the shoulder, but some neck and cervical stabilization exercises would carry over to treatment of possible rotator cuff issue, including theraband rows, extension, Ws. At this point and at the point of injury we encouraged follow up with physician for possible treatment of this area or evaluation of injury. Patient has made minimal changes as far as intensity and frequency of the radiating arm pain.   Did report temporary decrease of these symptoms with manual traction typically, which would indicate possible cervical disc issue. Notes continued pain at the lateral hip and weakness at the lateral hip region, has been given extensive HEP to continue to work on this issue at this time. Pain:  [x] Yes  [] No Location: R arm, (R) lateral leg to foot  Pain Rating: (0-10 scale) 5-6/10  Pain altered Tx:  [] No  [] Yes  Action:  Comments:    Objective:  Modalities:   Precautions:  Exercises:   Exercise Reps/ Time Weight/ Level Completed  Today Comments   Mat       Abdominal bracing  15x 5\"  x    priformis 3x 30\"      Marching  10X2 red x    clamshells 15x red X    bridges 15x  x    Hip ABD 10x2 3\" holds red x    Modified crunch with physio ball  15x  x Cues for core engagement and slow controlled movement. LTR with physioball 10x  x Cues for core engagement and slow controlled movement. Cervical ROM 10x5\"  X With pillow   AAROM shoulder flexion 10x  X With pillow   Scapular retraction- supine 15x 3\"      Chin tucks-supine 10x 3\"  X With pillow   Flys 10x 3\"   Patient reporting increased pain with lowering with RUE.           Manual 10'   Pt demos improved tolerance    Cervical traction 10x5\"  x    UT stretch 2x 30\"  x    P/A mobs to cervical spine 3'  x    Gentle STM to SCM  2'      Gentle shoulder traction 5'      Trigger point release to posterior shoulder and RUT 3'      Pec release 3'      Other:  Spine    ROM:  Cervical: rotation 70 degrees (L), (R) 70 with pain in C5 central region with (B) testing, SB (L) 25, (R) 25 with (L) sided trap pain with (L) testing; flexion 50 degrees, extension 30 degrees with complaints of pain in burn area of the neck     AROM of shoulder  Flexion  165 degrees (B) with pain in (R) medial scapula  Abduction 145 degrees (B) shoulder pain     Strength RLE  Comment: knee flexion, extension 3/5; hip abduction 3/5, hip flexion, DF 3-/5  Strength LLE  Comment: knee flexion, extension 4-/5; hip abduction 4-/5, hip flexion, DF 4-/5  Strength RUE  Comment: mid trap-3/5 lower trap 3+/5 rhomboids 3+/5  Strength DARIENE  Comment: mid trap-3/5 lower trap 3+/5 rhomboids 3+/5  Additional Measures  Special Tests: Tandem stance balance is less than 5 seconds (R) behind (L), 5 seconds (L) behind (R)  Other: Gait- unsteady gait, inconsistent foot placement, (L) stance tends to be wider during gait and (R) circumducts during gait; increased trunk compensation in frontal and sagital plane. Specific Instructions for next treatment:      Assessment: [] Progressing toward goals. [] No change. [] Other:    [x] Patient would continue to benefit from skilled physical therapy services in order to: improve balance, functional tolerance of ambulation, standing activities. Discharge back to physician at this time, mild improvement grossly since the last progress note with active mobility of the shoulder but still notes limitations with lifting/carrying. This was not formally evaluated by the therapist and patient did not follow up with physician to have this evaluated, but treatment for cervical and scapular stabilization exercises would have partially been part of treatment for possible rotator cuff issue. Minimal change of neck symptoms, radiating arm symptoms consistent with possible cervical disc issue and minimal change of weakness in the LEs with associated back pain at this time. Recommend that the patient follow up with the referring physician for possible additional evaluation, possible additional testing at this time due to no change in symptoms.     Goals  Short term goals  Time Frame for Short term goals: 6 visits  Short term goal 1: Improved balance and able to do Tandem balance up to 5 seconds NOT MET  Short term goal 2: LE strength testing to 4/5 for (R) LE strength testing NOT MET  Short term goal 3: IND and compliant with HEP MET  Short term goal 4: improved pain levels to 4-5/10 NOT MET  Short term goal 5: Improved neural tension testing grossly overall NOT MET  Long term goals  Time Frame for Long term goals : 12 visits  Long term goal 1: 1/10 pain NOT MET  Long term goal 2: 4+/5 LE strength testing NOT MET, MINIMAL PROGRESSION  Long term goal 3: Normalized cervical ROM NOT MET, PROGRESSING MINIMALLY  Long term goal 4: End range limitation with neural tension testing only. NOT MET    Pt. Education:  [x] Yes  [] No  [] Reviewed Prior HEP/Ed  Method of Education: [x] Verbal  [] Demo  [x] Written  Comprehension of Education:  [x] Verbalizes understanding. [] Demonstrates understanding. [] Needs review. [] Demonstrates/verbalizes HEP/Ed previously given. Plan: [] Continue per plan of care. [x] Other:  Discharge to HEP and discharge back to the physician at this time. Minimal change in symptoms. Presents as possible cervical disc with temporary improvement with exercises, lumbar degenerative disc issue with associated weakness and radicular symptoms. Given comprehensive HEP to work on these issues.       Treatment Charges: Mins Units   []  Modalities     [x]  Ther Exercise 30 2   [x]  Manual Therapy 10 1   []  Ther Activities     []  Aquatics     []  Neuromuscular     [] Vasocompression     [] Gait Training     [] Dry needling        [] 1 or 2 muscles        [] 3 or more muscles     []  Other     Total Treatment time 40 3     Time In: 0800          Time Out: 0840    Electronically signed by:  Aries Moreno PT

## 2022-09-23 ENCOUNTER — NURSE TRIAGE (OUTPATIENT)
Dept: OTHER | Facility: CLINIC | Age: 62
End: 2022-09-23

## 2022-09-23 NOTE — TELEPHONE ENCOUNTER
Received call from [address] at [facility] with [employer] Complaint. Subjective: Caller states \"Body pain\"     Current Symptoms: Body pain;  Muscle and skin pain;  Pain travels; Was sent to a letter that a machine in his open heart surgery could have contaminated his blood; Was told he has neuropathy; Unable to stand for long periods of time; Onset: 2012; unchanged    Associated Symptoms: reduced activity, increased wakefulness    Pain Severity: 7/10; aching, burning;     Temperature: denies     What has been tried: Gabapentin, percocet    Recommended disposition: Go to [facility]    Care advice provided, patient verbalizes understanding; denies any other questions or concerns; instructed to call back for any new or worsening symptoms. Patient/Caller agrees with recommended disposition; writer provided warm transfer to [facility] at [facility] for appointment scheduling    Attention Provider: Thank you for allowing me to participate in the care of your patient. The patient was connected to triage in response to information provided to the ECC/PSC. Please do not respond through this encounter as the response is not directed to a shared pool.     Reason for Disposition   SEVERE pain (e.g., excruciating, unable to do any normal activities) and not improved 2 hours after pain medicine    Protocols used: Muscle Aches and Body Pain-ADULT-[place]

## 2022-09-25 PROBLEM — G89.4 CHRONIC PAIN SYNDROME: Status: ACTIVE | Noted: 2022-09-25

## 2022-09-25 PROBLEM — G62.9 NEUROPATHY: Status: ACTIVE | Noted: 2022-09-25

## 2022-10-12 ENCOUNTER — HOSPITAL ENCOUNTER (OUTPATIENT)
Age: 62
Setting detail: OBSERVATION
Discharge: HOME | End: 2022-10-13
Attending: EMERGENCY MEDICINE | Admitting: EMERGENCY MEDICINE
Payer: MEDICARE

## 2022-10-12 ENCOUNTER — APPOINTMENT (OUTPATIENT)
Dept: GENERAL RADIOLOGY | Age: 62
End: 2022-10-12
Payer: MEDICARE

## 2022-10-12 ENCOUNTER — APPOINTMENT (OUTPATIENT)
Dept: CT IMAGING | Age: 62
End: 2022-10-12
Payer: MEDICARE

## 2022-10-12 DIAGNOSIS — M50.30 DDD (DEGENERATIVE DISC DISEASE), CERVICAL: ICD-10-CM

## 2022-10-12 DIAGNOSIS — G89.4 CHRONIC PAIN SYNDROME: ICD-10-CM

## 2022-10-12 DIAGNOSIS — M54.40 CHRONIC LOW BACK PAIN WITH SCIATICA, SCIATICA LATERALITY UNSPECIFIED, UNSPECIFIED BACK PAIN LATERALITY: ICD-10-CM

## 2022-10-12 DIAGNOSIS — G89.29 CHRONIC LOW BACK PAIN WITH SCIATICA, SCIATICA LATERALITY UNSPECIFIED, UNSPECIFIED BACK PAIN LATERALITY: ICD-10-CM

## 2022-10-12 DIAGNOSIS — R07.9 CHEST PAIN, UNSPECIFIED TYPE: Primary | ICD-10-CM

## 2022-10-12 LAB
ABSOLUTE EOS #: 0.21 K/UL (ref 0–0.44)
ABSOLUTE IMMATURE GRANULOCYTE: 0.04 K/UL (ref 0–0.3)
ABSOLUTE LYMPH #: 1.51 K/UL (ref 1.1–3.7)
ABSOLUTE MONO #: 0.67 K/UL (ref 0.1–1.2)
ALBUMIN SERPL-MCNC: 4.2 G/DL (ref 3.5–5.2)
ALBUMIN/GLOBULIN RATIO: 1.4 (ref 1–2.5)
ALP BLD-CCNC: 71 U/L (ref 40–129)
ALT SERPL-CCNC: 11 U/L (ref 5–41)
ANION GAP SERPL CALCULATED.3IONS-SCNC: 12 MMOL/L (ref 9–17)
AST SERPL-CCNC: 22 U/L
BASOPHILS # BLD: 1 % (ref 0–2)
BASOPHILS ABSOLUTE: 0.1 K/UL (ref 0–0.2)
BILIRUB SERPL-MCNC: 0.6 MG/DL (ref 0.3–1.2)
BUN BLDV-MCNC: 11 MG/DL (ref 8–23)
CALCIUM SERPL-MCNC: 8.7 MG/DL (ref 8.6–10.4)
CHLORIDE BLD-SCNC: 98 MMOL/L (ref 98–107)
CO2: 25 MMOL/L (ref 20–31)
CREAT SERPL-MCNC: 0.92 MG/DL (ref 0.7–1.2)
EOSINOPHILS RELATIVE PERCENT: 2 % (ref 1–4)
GFR SERPL CREATININE-BSD FRML MDRD: >60 ML/MIN/1.73M2
GLUCOSE BLD-MCNC: 87 MG/DL (ref 70–99)
HCT VFR BLD CALC: 40.2 % (ref 40.7–50.3)
HEMOGLOBIN: 13.6 G/DL (ref 13–17)
IMMATURE GRANULOCYTES: 1 %
LYMPHOCYTES # BLD: 17 % (ref 24–43)
MCH RBC QN AUTO: 31.5 PG (ref 25.2–33.5)
MCHC RBC AUTO-ENTMCNC: 33.8 G/DL (ref 28.4–34.8)
MCV RBC AUTO: 93.1 FL (ref 82.6–102.9)
MONOCYTES # BLD: 8 % (ref 3–12)
NRBC AUTOMATED: 0 PER 100 WBC
PDW BLD-RTO: 13.7 % (ref 11.8–14.4)
PLATELET # BLD: ABNORMAL K/UL (ref 138–453)
PLATELET, FLUORESCENCE: NORMAL K/UL (ref 138–453)
POTASSIUM SERPL-SCNC: 4.4 MMOL/L (ref 3.7–5.3)
RBC # BLD: 4.32 M/UL (ref 4.21–5.77)
SARS-COV-2, RAPID: NOT DETECTED
SEG NEUTROPHILS: 71 % (ref 36–65)
SEGMENTED NEUTROPHILS ABSOLUTE COUNT: 6.3 K/UL (ref 1.5–8.1)
SODIUM BLD-SCNC: 135 MMOL/L (ref 135–144)
SPECIMEN DESCRIPTION: NORMAL
TOTAL PROTEIN: 7.1 G/DL (ref 6.4–8.3)
TROPONIN, HIGH SENSITIVITY: 10 NG/L (ref 0–22)
TROPONIN, HIGH SENSITIVITY: 10 NG/L (ref 0–22)
WBC # BLD: 8.8 K/UL (ref 3.5–11.3)

## 2022-10-12 PROCEDURE — 93005 ELECTROCARDIOGRAM TRACING: CPT | Performed by: STUDENT IN AN ORGANIZED HEALTH CARE EDUCATION/TRAINING PROGRAM

## 2022-10-12 PROCEDURE — 3209999900 CT THORACIC SPINE TRAUMA RECONSTRUCTION

## 2022-10-12 PROCEDURE — 6360000002 HC RX W HCPCS: Performed by: STUDENT IN AN ORGANIZED HEALTH CARE EDUCATION/TRAINING PROGRAM

## 2022-10-12 PROCEDURE — 71045 X-RAY EXAM CHEST 1 VIEW: CPT

## 2022-10-12 PROCEDURE — 80053 COMPREHEN METABOLIC PANEL: CPT

## 2022-10-12 PROCEDURE — 6360000004 HC RX CONTRAST MEDICATION: Performed by: STUDENT IN AN ORGANIZED HEALTH CARE EDUCATION/TRAINING PROGRAM

## 2022-10-12 PROCEDURE — 84484 ASSAY OF TROPONIN QUANT: CPT

## 2022-10-12 PROCEDURE — 96372 THER/PROPH/DIAG INJ SC/IM: CPT

## 2022-10-12 PROCEDURE — 99285 EMERGENCY DEPT VISIT HI MDM: CPT

## 2022-10-12 PROCEDURE — 6370000000 HC RX 637 (ALT 250 FOR IP): Performed by: STUDENT IN AN ORGANIZED HEALTH CARE EDUCATION/TRAINING PROGRAM

## 2022-10-12 PROCEDURE — G0378 HOSPITAL OBSERVATION PER HR: HCPCS

## 2022-10-12 PROCEDURE — 6370000000 HC RX 637 (ALT 250 FOR IP)

## 2022-10-12 PROCEDURE — 85055 RETICULATED PLATELET ASSAY: CPT

## 2022-10-12 PROCEDURE — 2580000003 HC RX 258: Performed by: STUDENT IN AN ORGANIZED HEALTH CARE EDUCATION/TRAINING PROGRAM

## 2022-10-12 PROCEDURE — 87635 SARS-COV-2 COVID-19 AMP PRB: CPT

## 2022-10-12 PROCEDURE — 96374 THER/PROPH/DIAG INJ IV PUSH: CPT

## 2022-10-12 PROCEDURE — 72131 CT LUMBAR SPINE W/O DYE: CPT

## 2022-10-12 PROCEDURE — 71275 CT ANGIOGRAPHY CHEST: CPT

## 2022-10-12 PROCEDURE — 85025 COMPLETE CBC W/AUTO DIFF WBC: CPT

## 2022-10-12 RX ORDER — ALBUTEROL SULFATE 90 UG/1
2 AEROSOL, METERED RESPIRATORY (INHALATION) EVERY 4 HOURS PRN
Status: DISCONTINUED | OUTPATIENT
Start: 2022-10-12 | End: 2022-10-13 | Stop reason: HOSPADM

## 2022-10-12 RX ORDER — ENOXAPARIN SODIUM 100 MG/ML
40 INJECTION SUBCUTANEOUS DAILY
Status: DISCONTINUED | OUTPATIENT
Start: 2022-10-12 | End: 2022-10-13 | Stop reason: HOSPADM

## 2022-10-12 RX ORDER — SODIUM CHLORIDE 0.9 % (FLUSH) 0.9 %
5-40 SYRINGE (ML) INJECTION EVERY 12 HOURS SCHEDULED
Status: DISCONTINUED | OUTPATIENT
Start: 2022-10-12 | End: 2022-10-13 | Stop reason: HOSPADM

## 2022-10-12 RX ORDER — GABAPENTIN 800 MG/1
800 TABLET ORAL 3 TIMES DAILY
Status: DISCONTINUED | OUTPATIENT
Start: 2022-10-12 | End: 2022-10-13

## 2022-10-12 RX ORDER — SODIUM CHLORIDE 0.9 % (FLUSH) 0.9 %
5-40 SYRINGE (ML) INJECTION PRN
Status: DISCONTINUED | OUTPATIENT
Start: 2022-10-12 | End: 2022-10-13 | Stop reason: HOSPADM

## 2022-10-12 RX ORDER — ONDANSETRON 2 MG/ML
4 INJECTION INTRAMUSCULAR; INTRAVENOUS EVERY 6 HOURS PRN
Status: DISCONTINUED | OUTPATIENT
Start: 2022-10-12 | End: 2022-10-13 | Stop reason: HOSPADM

## 2022-10-12 RX ORDER — OXYCODONE HYDROCHLORIDE 5 MG/1
2.5 TABLET ORAL
Status: COMPLETED | OUTPATIENT
Start: 2022-10-12 | End: 2022-10-12

## 2022-10-12 RX ORDER — ALBUTEROL SULFATE 2.5 MG/3ML
2.5 SOLUTION RESPIRATORY (INHALATION) EVERY 6 HOURS PRN
Status: DISCONTINUED | OUTPATIENT
Start: 2022-10-12 | End: 2022-10-13 | Stop reason: HOSPADM

## 2022-10-12 RX ORDER — ACETAMINOPHEN 325 MG/1
650 TABLET ORAL EVERY 6 HOURS PRN
Status: DISCONTINUED | OUTPATIENT
Start: 2022-10-12 | End: 2022-10-13 | Stop reason: HOSPADM

## 2022-10-12 RX ORDER — POLYETHYLENE GLYCOL 3350 17 G/17G
17 POWDER, FOR SOLUTION ORAL DAILY PRN
Status: DISCONTINUED | OUTPATIENT
Start: 2022-10-12 | End: 2022-10-13 | Stop reason: HOSPADM

## 2022-10-12 RX ORDER — KETOROLAC TROMETHAMINE 15 MG/ML
15 INJECTION, SOLUTION INTRAMUSCULAR; INTRAVENOUS ONCE
Status: DISCONTINUED | OUTPATIENT
Start: 2022-10-12 | End: 2022-10-12

## 2022-10-12 RX ORDER — MORPHINE SULFATE 4 MG/ML
4 INJECTION, SOLUTION INTRAMUSCULAR; INTRAVENOUS ONCE
Status: COMPLETED | OUTPATIENT
Start: 2022-10-12 | End: 2022-10-12

## 2022-10-12 RX ORDER — LOSARTAN POTASSIUM 50 MG/1
50 TABLET ORAL DAILY
Status: DISCONTINUED | OUTPATIENT
Start: 2022-10-13 | End: 2022-10-13

## 2022-10-12 RX ORDER — LOSARTAN POTASSIUM 25 MG/1
TABLET ORAL
COMMUNITY
Start: 2022-09-17

## 2022-10-12 RX ORDER — SODIUM CHLORIDE 9 MG/ML
INJECTION, SOLUTION INTRAVENOUS PRN
Status: DISCONTINUED | OUTPATIENT
Start: 2022-10-12 | End: 2022-10-13 | Stop reason: HOSPADM

## 2022-10-12 RX ORDER — ACETAMINOPHEN 650 MG/1
650 SUPPOSITORY RECTAL EVERY 6 HOURS PRN
Status: DISCONTINUED | OUTPATIENT
Start: 2022-10-12 | End: 2022-10-13 | Stop reason: HOSPADM

## 2022-10-12 RX ORDER — NITROGLYCERIN 0.4 MG/1
0.4 TABLET SUBLINGUAL EVERY 5 MIN PRN
Status: DISCONTINUED | OUTPATIENT
Start: 2022-10-12 | End: 2022-10-13 | Stop reason: HOSPADM

## 2022-10-12 RX ORDER — AMLODIPINE BESYLATE 5 MG/1
TABLET ORAL
COMMUNITY
Start: 2022-09-16

## 2022-10-12 RX ORDER — OXYCODONE HYDROCHLORIDE AND ACETAMINOPHEN 5; 325 MG/1; MG/1
1 TABLET ORAL EVERY 8 HOURS PRN
Status: DISCONTINUED | OUTPATIENT
Start: 2022-10-12 | End: 2022-10-13

## 2022-10-12 RX ORDER — ASPIRIN 81 MG/1
81 TABLET, CHEWABLE ORAL DAILY
Status: DISCONTINUED | OUTPATIENT
Start: 2022-10-12 | End: 2022-10-13 | Stop reason: HOSPADM

## 2022-10-12 RX ORDER — ONDANSETRON 4 MG/1
4 TABLET, ORALLY DISINTEGRATING ORAL EVERY 8 HOURS PRN
Status: DISCONTINUED | OUTPATIENT
Start: 2022-10-12 | End: 2022-10-13 | Stop reason: HOSPADM

## 2022-10-12 RX ORDER — AMITRIPTYLINE HYDROCHLORIDE 25 MG/1
25 TABLET, FILM COATED ORAL NIGHTLY
Status: DISCONTINUED | OUTPATIENT
Start: 2022-10-12 | End: 2022-10-13 | Stop reason: HOSPADM

## 2022-10-12 RX ADMIN — OXYCODONE 2.5 MG: 5 TABLET ORAL at 22:38

## 2022-10-12 RX ADMIN — ENOXAPARIN SODIUM 40 MG: 100 INJECTION SUBCUTANEOUS at 17:02

## 2022-10-12 RX ADMIN — MORPHINE SULFATE 4 MG: 4 INJECTION, SOLUTION INTRAMUSCULAR; INTRAVENOUS at 13:17

## 2022-10-12 RX ADMIN — OXYCODONE HYDROCHLORIDE AND ACETAMINOPHEN 1 TABLET: 5; 325 TABLET ORAL at 17:01

## 2022-10-12 RX ADMIN — GABAPENTIN 800 MG: 800 TABLET ORAL at 21:57

## 2022-10-12 RX ADMIN — GABAPENTIN 800 MG: 800 TABLET ORAL at 17:40

## 2022-10-12 RX ADMIN — SODIUM CHLORIDE, PRESERVATIVE FREE 10 ML: 5 INJECTION INTRAVENOUS at 21:19

## 2022-10-12 RX ADMIN — ASPIRIN 81 MG: 81 TABLET, CHEWABLE ORAL at 17:01

## 2022-10-12 RX ADMIN — IOPAMIDOL 100 ML: 755 INJECTION, SOLUTION INTRAVENOUS at 14:19

## 2022-10-12 ASSESSMENT — ENCOUNTER SYMPTOMS
BACK PAIN: 1
CONSTIPATION: 0
SHORTNESS OF BREATH: 0
ABDOMINAL PAIN: 0
NAUSEA: 0
VOMITING: 0
DIARRHEA: 0
COUGH: 0

## 2022-10-12 ASSESSMENT — PAIN - FUNCTIONAL ASSESSMENT: PAIN_FUNCTIONAL_ASSESSMENT: 0-10

## 2022-10-12 ASSESSMENT — PAIN DESCRIPTION - LOCATION
LOCATION: BACK
LOCATION: CHEST

## 2022-10-12 ASSESSMENT — PAIN SCALES - GENERAL
PAINLEVEL_OUTOF10: 10
PAINLEVEL_OUTOF10: 9
PAINLEVEL_OUTOF10: 8
PAINLEVEL_OUTOF10: 9
PAINLEVEL_OUTOF10: 7
PAINLEVEL_OUTOF10: 9

## 2022-10-12 ASSESSMENT — PAIN DESCRIPTION - DESCRIPTORS: DESCRIPTORS: ACHING

## 2022-10-12 NOTE — ED NOTES
The following labs labeled with pt sticker and tubed to lab:     [] Blue     [] Lavender   [] on ice  [] Green/yellow  [] Green/black [] on ice  [] Yellow  [] Red  [] Pink      [x] COVID-19 swab    [x] Rapid  [] PCR  [] Flu Swab  [] Strep Swab  [] Peds Viral Panel     [] Urine Sample  [] Pelvic Cultures  [] Blood Cultures   [] Wound Cultures          Marianne Wild RN  10/12/22 0953

## 2022-10-12 NOTE — ED NOTES
Pt presents to the ED with c/o of back pain. Upon entering room, patient is complaining of chest pain. Pt states he has had chest pain before but has not followed up for it. Pt states he has chronic back pain and takes percocet and gabapentin for this multiple times today. Last dose was this morning around 0600. Pt states he does not take any other medications, including his medications for blood pressure. Pt states pain is 8/10. Pt placed on full cardiac monitor,Call light in reach, white board updated. Call light in reach, white board updated.        Blanquita Munguia RN  10/12/22 5500

## 2022-10-12 NOTE — ED NOTES
Home medications reconciled with patient and updated in chart. Pt states he only takes his percocet and gabapentin.       Eugene Alba RN  10/12/22 1579
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
## 2022-10-12 NOTE — ED PROVIDER NOTES
[facility]  ED  Emergency Department Encounter  Emergency Medicine Resident     Pt Sarika Prasad  MRN: [id number]  Lilliamgfglo 1960  Date of evaluation: 10/12/22  PCP:  TAYLOR Steel CNP      CHIEF COMPLAINT       Chief Complaint   Patient presents with    Chest Pain    Back Pain       HISTORY OF PRESENT ILLNESS  (Location/Symptom, Timing/Onset, Context/Setting, Quality, Duration, Modifying Factors, Severity.)      Franny Kilgore is a 58 y.o. male with PMH including ascending aortic aneurysm s/p resection in 2012, HTN, HLD, COPD who presents to the ED for mid back pain after attempting to lift plywood on top of his head and heard an audible \"crunch. \" On arrival to the ED, he began experiencing left sided chest pain with left shoulder numbness. With this chest pain he did not become sweaty, pass out, or become short of breath. He is hypertensive on arrive with SBP in the 170s. Patient states that he is actively having mild chest pain, but his back pain is much more severe. He takes Percocet daily for chronic back pain. PAST MEDICAL / SURGICAL / SOCIAL / FAMILY HISTORY      has a past medical history of Abdominal pain, Ascending aortic aneurysm, Chronic pain, COPD (chronic obstructive pulmonary disease) ([address] Dyslipidemia, GERD (gastroesophageal reflux disease), Hepatitis A, History of rib fractures multiple, Hypertension, Other accident, Pneumonia, Rectal bleeding, Rectal pain, S/P thoracic aortic aneurysm repair, Severe single current episode of major depressive disorder, without psychotic features ([address] and Spinal fracture. 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 Social History     Socioeconomic History    Marital status:      Spouse name: Not on file    Number of children: Not on file    Years of education: Not on file    Highest education level: Not on file   Occupational History    Occupation: disabled--   Tobacco Use    Smoking status: Former     Packs/day: 1.00     Years: 22.00     Pack years: 22.00     Types: Cigarettes     Start date: 1972     Quit date: 1990     Years since quittin.1    Smokeless tobacco: Never   Vaping Use    Vaping Use: Never used   Substance and Sexual Activity    Alcohol use: No     Alcohol/week: 0.0 standard drinks     Comment: stopped drinking Set2014    Drug use: Yes     Frequency: 3.0 times per week     Types: Marijuana [place] [address])     Comment: marijuana occasionally    Sexual activity: Not Currently   Other Topics Concern    Not on file   Social History Narrative    Not on file     Social Determinants of Health     Financial Resource Strain: Not on file   Food Insecurity: Not on file   Transportation Needs: Not on file   Physical Activity: Inactive    Days of Exercise per Week: 0 days    Minutes of Exercise per Session: 0 min   Stress: Not on file   Social Connections: Not on file   Intimate Partner Violence: Not on file   Housing Stability: Not on file       Family History   Problem Relation Age of Onset    Heart Disease Mother     Diabetes Mother     Cancer Sister         colon-uterine    Diabetes Sister     Kidney Disease Sister     Diabetes Sister     Diabetes Sister     Diabetes Brother        Allergies:  Bactrim [sulfamethoxazole-trimethoprim], Keflex [cephalexin], Aleve [naproxen], Lyrica [pregabalin], Tramadol, and Vicodin [hydrocodone-acetaminophen]    Home Medications:  Prior to Admission medications    Medication Sig Start Date End Date Taking?  Authorizing Provider   losartan (COZAAR) 25 MG tablet  22   Historical Provider, MD   amLODIPine (NORVASC) 5 MG tablet  9/16/22   Historical Provider, MD   oxyCODONE-acetaminophen (PERCOCET) 5-325 MG per tablet Take 1 tablet by mouth every 8 hours as needed for Pain for up to 30 days. 9/24/22 10/24/22  TAYLOR Vela CNP   amitriptyline (ELAVIL) 25 MG tablet Take 1 tablet by mouth nightly  Patient not taking: Reported on 10/12/2022 9/23/22   Annette Medina MD   gabapentin (NEURONTIN) 800 MG tablet TAKE ONE TABLET BY MOUTH FOUR TIMES A DAY 6/8/22 7/8/22  TAYLOR Vela CNP   vitamin D (ERGOCALCIFEROL) 1.25 MG (93713 UT) CAPS capsule Take 1 capsule by mouth once a week 5/6/22   TAYLOR Vela CNP   pyridoxine (RA VITAMIN B-6) 50 MG tablet Take 1 tablet by mouth daily 4/27/22 4/27/23  TAYLOR Vela CNP   amLODIPine (NORVASC) 10 MG tablet TAKE 1 TABLET BY MOUTH DAILY  Patient not taking: Reported on 10/12/2022 4/25/22   TAYLOR Vela CNP   alfuzosin (UROXATRAL) 10 MG extended release tablet Take 1 tablet by mouth daily 3/22/22   Wilbert Diaz MD   albuterol sulfate HFA (PROVENTIL HFA) 108 (90 Base) MCG/ACT inhaler Inhale 2 puffs into the lungs every 4 hours as needed for Wheezing or Shortness of Breath  Patient not taking: Reported on 10/12/2022 10/27/21   TAYLOR Vela CNP   tiotropium (SPIRIVA RESPIMAT) 2.5 MCG/ACT AERS inhaler Inhale 2 puffs into the lungs daily  Patient not taking: Reported on 10/12/2022 10/27/21   TAYLOR Vela CNP   Skin Protectants, Misc. (EUCERIN) cream Apply topically twice daily.  9/28/21   Ean Varghese MD   losartan (COZAAR) 50 MG tablet  10/26/20   Historical Provider, MD   aspirin 81 MG chewable tablet Take 1 tablet by mouth daily  Patient not taking: Reported on 10/12/2022 12/2/19   Leandro Edwards APRN - CNP   albuterol (PROVENTIL) (2.5 MG/3ML) 0.083% nebulizer solution Take 3 mLs by nebulization every 6 hours as needed for Wheezing  Patient not taking: Reported on 10/12/2022 12/2/19   Galucius TAYLOR Amaya - CNP   Blood Pressure KIT Diagnosis: HTN. Needs to check blood pressure 1-2 times a day until stable, then once a day. Goal blood pressure less than 135/85, and above 110/60. 1/17/19   Sreedhar Christensen MD       REVIEW OF SYSTEMS    (2-9 systems for level 4, 10 or more for level 5)      Review of Systems   Constitutional:  Negative for chills, fatigue and fever. HENT:  Negative for congestion. Eyes:  Negative for visual disturbance. Respiratory:  Negative for shortness of breath. Cardiovascular:  Positive for chest pain. Gastrointestinal:  Negative for abdominal pain, diarrhea, nausea and vomiting. Musculoskeletal:  Positive for back pain. Skin:  Negative for pallor and wound. Neurological:  Negative for syncope, numbness and headaches. Psychiatric/Behavioral:  The patient is not nervous/anxious. PHYSICAL EXAM   (up to 7 for level 4, 8 or more for level 5)      INITIAL VITALS:   BP (!) 146/95   Pulse 54   Temp 97.7 °F (36.5 °C) (Oral)   Resp 18   Ht 5' 9\" (1.753 m)   Wt 137 lb (62.1 kg)   SpO2 95%   BMI 20.23 kg/m²     Physical Exam  Constitutional:       Appearance: He is ill-appearing. Comments: Thin male resting in discomfort due to back and chest pain   HENT:      Head: Normocephalic and atraumatic. Right Ear: External ear normal.      Left Ear: External ear normal.      Nose: Nose normal. No congestion. Mouth/Throat:      Mouth: Mucous membranes are moist.      Pharynx: Oropharynx is clear. Eyes:      General: No scleral icterus. Extraocular Movements: Extraocular movements intact. Cardiovascular:      Rate and Rhythm: Normal rate and regular rhythm. Pulses: Normal pulses. Heart sounds: Normal heart sounds. No murmur heard. Pulmonary:      Effort: Pulmonary effort is normal. No respiratory distress. Breath sounds: Normal breath sounds. No wheezing.    Abdominal: General: Abdomen is flat. There is no distension. Palpations: Abdomen is soft. Tenderness: There is no guarding. Musculoskeletal:      Cervical back: Neck supple. Skin:     General: Skin is warm and dry. Comments: Various patches of white/old scarring    Neurological:      Mental Status: He is oriented to person, place, and time. Mental status is at baseline. Motor: No weakness. Psychiatric:         Mood and Affect: Mood normal.         Behavior: Behavior normal.         Thought Content: Thought content normal.         Judgment: Judgment normal.       DIFFERENTIAL  DIAGNOSIS     PLAN (LABS / IMAGING / EKG):  Orders Placed This Encounter   Procedures    COVID-19, Rapid    XR CHEST PORTABLE    CTA CHEST W WO CONTRAST    CT THORACIC SPINE TRAUMA RECONSTRUCTION    CT LUMBAR SPINE WO CONTRAST    Comprehensive Metabolic Panel    Troponin    CBC with Auto Differential    Immature Platelet Fraction    Basic Metabolic Panel w/ Reflex to MG    CBC with Auto Differential    ADULT DIET;  Regular    Cardiac Monitor    Pulse Oximetry    Vital signs per unit routine    Admission/Observation order previously placed    Up as tolerated    Full Code    Inpatient consult to Cardiology    Inpatient consult to Neurosurgery    Initiate Oxygen Therapy Protocol    EKG 12 Lead    Saline lock IV    Place in Observation Service       MEDICATIONS ORDERED:  Orders Placed This Encounter   Medications    nitroGLYCERIN (NITROSTAT) SL tablet 0.4 mg    DISCONTD: ketorolac (TORADOL) injection 15 mg    morphine injection 4 mg    iopamidol (ISOVUE-370) 76 % injection 100 mL    albuterol (PROVENTIL) nebulizer solution 2.5 mg     Order Specific Question:   Initiate RT Bronchodilator Protocol     Answer:   Yes - Inpatient Protocol    albuterol sulfate HFA (PROVENTIL;VENTOLIN;PROAIR) 108 (90 Base) MCG/ACT inhaler 2 puff     Order Specific Question:   Initiate RT Bronchodilator Protocol     Answer:   Yes - Inpatient Protocol amitriptyline (ELAVIL) tablet 25 mg    aspirin chewable tablet 81 mg    gabapentin (NEURONTIN) tablet 800 mg    losartan (COZAAR) tablet 50 mg    oxyCODONE-acetaminophen (PERCOCET) 5-325 MG per tablet 1 tablet    tiotropium (SPIRIVA RESPIMAT) 2.5 MCG/ACT inhaler 2 puff    sodium chloride flush 0.9 % injection 5-40 mL    sodium chloride flush 0.9 % injection 5-40 mL    0.9 % sodium chloride infusion    enoxaparin (LOVENOX) injection 40 mg     Order Specific Question:   Indication of Use     Answer:   Prophylaxis-DVT/PE    OR Linked Order Group     ondansetron (ZOFRAN-ODT) disintegrating tablet 4 mg     ondansetron (ZOFRAN) injection 4 mg    polyethylene glycol (GLYCOLAX) packet 17 g    OR Linked Order Group     acetaminophen (TYLENOL) tablet 650 mg     acetaminophen (TYLENOL) suppository 650 mg       DDX: ACS/NSTEMI, compression fracture, chest pain unspecified, angina, aortic dissection     DIAGNOSTIC RESULTS / EMERGENCY DEPARTMENT COURSE / MDM   LAB RESULTS:  Results for orders placed or performed during the hospital encounter of 10/12/22   COVID-19, Rapid    Specimen: Nasopharyngeal Swab   Result Value Ref Range    Specimen Description . NASOPHARYNGEAL SWAB     SARS-CoV-2, Rapid Not Detected Not Detected   Comprehensive Metabolic Panel   Result Value Ref Range    Glucose 87 70 - 99 mg/dL    BUN 11 8 - 23 mg/dL    Creatinine 0.92 0.70 - 1.20 mg/dL    Est, Glom Filt Rate >60 >60 mL/min/1.73m2    Calcium 8.7 8.6 - 10.4 mg/dL    Sodium 135 135 - 144 mmol/L    Potassium 4.4 3.7 - 5.3 mmol/L    Chloride 98 98 - 107 mmol/L    CO2 25 20 - 31 mmol/L    Anion Gap 12 9 - 17 mmol/L    Alkaline Phosphatase 71 40 - 129 U/L    ALT 11 5 - 41 U/L    AST 22 <40 U/L    Total Bilirubin 0.6 0.3 - 1.2 mg/dL    Total Protein 7.1 6.4 - 8.3 g/dL    Albumin 4.2 3.5 - 5.2 g/dL    Albumin/Globulin Ratio 1.4 1.0 - 2.5   Troponin   Result Value Ref Range    Troponin, High Sensitivity 10 0 - 22 ng/L   Troponin   Result Value Ref Range Troponin, High Sensitivity 10 0 - 22 ng/L   CBC with Auto Differential   Result Value Ref Range    WBC 8.8 3.5 - 11.3 k/uL    RBC 4.32 4.21 - 5.77 m/uL    Hemoglobin 13.6 13.0 - 17.0 g/dL    Hematocrit 40.2 (L) 40.7 - 50.3 %    MCV 93.1 82.6 - 102.9 fL    MCH 31.5 25.2 - 33.5 pg    MCHC 33.8 28.4 - 34.8 g/dL    RDW 13.7 11.8 - 14.4 %    Platelets See Reflexed IPF Result 138 - 453 k/uL    NRBC Automated 0.0 0.0 per 100 WBC    Seg Neutrophils 71 (H) 36 - 65 %    Lymphocytes 17 (L) 24 - 43 %    Monocytes 8 3 - 12 %    Eosinophils % 2 1 - 4 %    Basophils 1 0 - 2 %    Immature Granulocytes 1 (H) 0 %    Segs Absolute 6.30 1.50 - 8.10 k/uL    Absolute Lymph # 1.51 1.10 - 3.70 k/uL    Absolute Mono # 0.67 0.10 - 1.20 k/uL    Absolute Eos # 0.21 0.00 - 0.44 k/uL    Basophils Absolute 0.10 0.00 - 0.20 k/uL    Absolute Immature Granulocyte 0.04 0.00 - 0.30 k/uL   Immature Platelet Fraction   Result Value Ref Range    Platelet, Fluorescence Platelet clumps present, count appears adequate. 138 - 453 k/uL       IMPRESSION: Troponin 10 x2, CMP unremarkable, CBC with diff unremarkable     RADIOLOGY:  CTA CHEST W WO CONTRAST   Final Result   1. Status post aortic root repair. No acute thoracic aortic abnormality or   dissection. 2.  No acute airspace disease identified. 3.  Abnormality in the epidural space at the L1 level spanning the L1-2 disc   space may represent disc herniation with superior migration. An epidural   hematoma is also a consideration. 4.  No acute osseous abnormality identified in the thoracic or lumbar spine. CT THORACIC SPINE TRAUMA RECONSTRUCTION   Final Result   1. Status post aortic root repair. No acute thoracic aortic abnormality or   dissection. 2.  No acute airspace disease identified. 3.  Abnormality in the epidural space at the L1 level spanning the L1-2 disc   space may represent disc herniation with superior migration.   An epidural   hematoma is also a consideration. 4.  No acute osseous abnormality identified in the thoracic or lumbar spine. CT LUMBAR SPINE WO CONTRAST   Final Result   1. Status post aortic root repair. No acute thoracic aortic abnormality or   dissection. 2.  No acute airspace disease identified. 3.  Abnormality in the epidural space at the L1 level spanning the L1-2 disc   space may represent disc herniation with superior migration. An epidural   hematoma is also a consideration. 4.  No acute osseous abnormality identified in the thoracic or lumbar spine. XR CHEST PORTABLE   Final Result   No acute cardiopulmonary process               EKG  EKG Interpretation    Interpreted by emergency department physician    Rhythm: normal sinus   Rate: normal  Axis: normal  Ectopy: none  Conduction: normal  ST Segments: nonspecific changes  T Waves: non specific changes  Q Waves: none    Clinical Impression: NSR with non-specific ST changes in inferior leads. LVH present    Dipak Gaviria DO      All EKG's are interpreted by the Emergency Department Physician who either signs or Co-signs this chart in the absence of a cardiologist.    EMERGENCY DEPARTMENT COURSE:    ED Course as of 10/12/22 1850   Wed Oct 12, 2022   1302 58 y.o. male with PMH including ascending aortic aneurysm s/p resection in 2012, HTN, HLD, COPD who presents to the ED for mid back pain after attempting to lift plywood on top of his head and heard an audible \"crunch. \" On arrival to the ED, he began experiencing left sided chest pain  [GC]   1302 Chest pain work up with CMP, CBC with diff, troponins, EKG, CXR [GC]   1303 Treating back pain with IV morphine. Patient is actively having chest pain, but this is not as severe as his back pain. Avoiding NSAIDs at this time due to the possibility of aortic dissection.   [GC]   5163 CTA chest with recon pending [GC]   1336 Troponin, High Sensitivity: 10  Follow-up troponin pending [GC]   1340 HEART score 6 [GC] 1340 CMP and CBC with diff unremarkable [GC]   1355 CXR showing:  \"FINDINGS:  The heart is not enlarged. No pulmonary venous congestion or edema. No focal lung consolidation or infiltrate. No pleural effusion or pneumothorax. Thoracic spine surgical hardware redemonstrated. IMPRESSION:  No acute cardiopulmonary process\" [QW]   6971 Cardiology consulted. Given concerning risk factors with heart score of 6, admitting patient to the observation unit for cardiology evaluation. Home medications reordered [GC]   1558 Troponin, High Sensitivity: 10 [GC]   1630 Neurosurgery consulted to comment on L1-L2 disc space abnormalities which may represent disc herniation with superior migration and potential epidural hematoma. Patient ambulating well without weakness  [GC]   1713 SARS-CoV-2, Rapid: Not Detected [GC]      ED Course User Index  [GC] Willem Boyle DO       No notes of EC Admission Criteria type on file. PROCEDURES:  N/a    CONSULTS:  IP CONSULT TO CARDIOLOGY  IP CONSULT TO NEUROSURGERY    CRITICAL CARE:  N/a    FINAL IMPRESSION      1. Chest pain, unspecified type          DISPOSITION / [address] Admitted 10/12/2022 03:52:55 PM      PATIENT REFERRED TO:  No follow-up provider specified.     DISCHARGE MEDICATIONS:  New Prescriptions    No medications on file       Willem Boyle DO  Emergency Medicine Resident    (Please note that portions of thisnote were completed with a voice recognition program.  Efforts were made to edit the dictations but occasionally words are mis-transcribed.)       Willem Boyle DO  Resident  10/12/22 [address] [facility], DO  Resident  10/12/22 [facility],   Resident  10/12/22 [facility], DO  Resident  10/12/22 [id number]

## 2022-10-12 NOTE — H&P
[facility]  CDU / [facility] NOTE     Pt Name: Ben Aldana  MRN: [id number]  [place] 1960  Date of evaluation: 10/12/22  Patient's PCP is :  TAYLOR Rios CNP    CHIEF COMPLAINT       Chief Complaint   Patient presents with    Chest Pain    Back Pain         HISTORY OF PRESENT ILLNESS    Ben Aldana is a 58 y.o. male who presents with complaints of back pain after trying to lift plywood and hearing a audible crunch sound. He also complains of left-sided chest pain that radiates to the left shoulder with associated numbness. He has a medical history of a aortic aneurysm with resection in 2012 and hypertension. He currently denies nausea, vomiting, fever and chills. Location/Symptom: Back pain, chest pain  Timing/Onset: 1 day  Provocation: Heavy lifting  Quality: Sharp  Radiation: Left shoulder  Severity: 8/10  Timing/Duration: Persistent  Modifying Factors: n/a    REVIEW OF SYSTEMS       Review of Systems   Constitutional:  Negative for chills, fatigue and fever. Respiratory:  Negative for cough and shortness of breath. Cardiovascular:  Positive for chest pain. Gastrointestinal:  Negative for abdominal pain, constipation and diarrhea. Genitourinary:  Negative for difficulty urinating. Musculoskeletal:  Positive for arthralgias, back pain and myalgias. Neurological:  Negative for dizziness and headaches. All other systems reviewed and are negative. (PQRS) Advance directives on face sheet per hospital policy.  No change unless specifically mentioned in chart    PAST MEDICAL HISTORY    has a past medical history of Abdominal pain, Ascending aortic aneurysm, Chronic pain, COPD (chronic obstructive pulmonary disease) ([facility] [address] Dyslipidemia, GERD (gastroesophageal reflux disease), Hepatitis A, History of rib fractures multiple, Hypertension, Other accident, Pneumonia, Rectal bleeding, Rectal pain, S/P thoracic aortic aneurysm repair, Severe single current episode of major depressive disorder, without psychotic features ([facility] [address] and Spinal fracture. I have reviewed the past medical history with the patient and it is pertinent to this complaint. 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 I have reviewed and agree with Surgical History entered and it is pertinent to this complaint. CURRENT MEDICATIONS     nitroGLYCERIN (NITROSTAT) SL tablet 0.4 mg, Q5 Min PRN  albuterol (PROVENTIL) nebulizer solution 2.5 mg, Q6H PRN  albuterol sulfate HFA (PROVENTIL;VENTOLIN;PROAIR) 108 (90 Base) MCG/ACT inhaler 2 puff, Q4H PRN  amitriptyline (ELAVIL) tablet 25 mg, Nightly  aspirin chewable tablet 81 mg, Daily  gabapentin (NEURONTIN) tablet 800 mg, TID  [START ON 10/13/2022] losartan (COZAAR) tablet 50 mg, Daily  oxyCODONE-acetaminophen (PERCOCET) 5-325 MG per tablet 1 tablet, Q8H PRN  tiotropium (SPIRIVA RESPIMAT) 2.5 MCG/ACT inhaler 2 puff, Daily  sodium chloride flush 0.9 % injection 5-40 mL, 2 times per day  sodium chloride flush 0.9 % injection 5-40 mL, PRN  0.9 % sodium chloride infusion, PRN  enoxaparin (LOVENOX) injection 40 mg, Daily  ondansetron (ZOFRAN-ODT) disintegrating tablet 4 mg, Q8H PRN   Or  ondansetron (ZOFRAN) injection 4 mg, Q6H PRN  polyethylene glycol (GLYCOLAX) packet 17 g, Daily PRN  acetaminophen (TYLENOL) tablet 650 mg, Q6H PRN   Or  acetaminophen (TYLENOL) suppository 650 mg, Q6H PRN  Hydrocerin cream CREA, BID        All medication charted and reviewed.     ALLERGIES     is allergic to bactrim [sulfamethoxazole-trimethoprim], keflex [cephalexin], aleve [naproxen], lyrica [pregabalin], tramadol, and vicodin [hydrocodone-acetaminophen]. FAMILY HISTORY     He indicated that his mother is alive. He indicated that his father is . He indicated that the status of his brother is unknown.     family history includes Cancer in his sister; Diabetes in his brother, mother, sister, sister, and sister; Heart Disease in his mother; Kidney Disease in his sister. The patient denies any pertinent family history. I have reviewed and agree with the family history entered. I have reviewed the Family History and it is not significant to the case    SOCIAL HISTORY      reports that he quit smoking about 32 years ago. His smoking use included cigarettes. He started smoking about 50 years ago. He has a 22.00 pack-year smoking history. He has never used smokeless tobacco. He reports current drug use. Frequency: 3.00 times per week. 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 He reports that he does not drink alcohol. I have reviewed and agree with all Social.  There are no concerns for substance abuse/use. PHYSICAL EXAM     INITIAL VITALS:  weight is 137 lb (62.1 kg). His oral temperature is 97.7 °F (36.5 °C). His blood pressure is 146/95 (abnormal) and his pulse is 54. His respiration is 18 and oxygen saturation is 95%. Physical Exam  Vitals and nursing note reviewed. HENT:      Head: Normocephalic. Mouth/Throat:      Pharynx: Oropharynx is clear. Eyes:      Pupils: Pupils are equal, round, and reactive to light. Cardiovascular:      Rate and Rhythm: Normal rate. Pulses: Normal pulses. Heart sounds: Normal heart sounds. Pulmonary:      Effort: Pulmonary effort is normal.      Breath sounds: Normal breath sounds. Abdominal:      General: Bowel sounds are normal.      Palpations: Abdomen is soft. Musculoskeletal:         General: Tenderness present. Normal range of motion. Skin:     General: Skin is warm.    Neurological:      General: No focal deficit present. Mental Status: He is alert and oriented to person, place, and time. Psychiatric:         Mood and Affect: Mood normal.           DIFFERENTIAL DIAGNOSIS/MDM:     DDx: Atypical chest pain, compression fracture    DIAGNOSTIC RESULTS         RADIOLOGY:   I directly visualized the following  images and reviewed the radiologist interpretations:    CTA CHEST W WO CONTRAST    Result Date: 10/12/2022  EXAMINATION: CT OF THE LUMBAR SPINE WITHOUT CONTRAST; CT OF THE THORACIC SPINE WITHOUT CONTRAST; CTA OF THE CHEST WITH AND WITHOUT CONTRAST  10/12/2022 TECHNIQUE: CT of the lumbar spine was performed without the administration of intravenous contrast. Multiplanar reformatted images are provided for review. Adjustment of mA and/or kV according to patient size was utilized. Automated exposure control, iterative reconstruction, and/or weight based adjustment of the mA/kV was utilized to reduce the radiation dose to as low as reasonably achievable.; CT of the thoracic spine was performed without the administration of intravenous contrast. Multiplanar reformatted images are provided for review. Automated exposure control, iterative reconstruction, and/or weight based adjustment of the mA/kV was utilized to reduce the radiation dose to as low as reasonably achievable.; CTA of the chest was performed before and after the administration of intravenous contrast. Multiplanar reformatted images are provided for review. MIP images are provided for review. Automated exposure control, iterative reconstruction, and/or weight based adjustment of the mA/kV was utilized to reduce the radiation dose to as low as reasonably achievable. 3D reconstructed images were performed on a separate workstation and provided for review. COMPARISON: MRI thoracic spine 06/22/2022. Chest CT 01/29/2022. CT abdomen and pelvis 12/08/2020.  HISTORY: ORDERING SYSTEM PROVIDED HISTORY: chest pain TECHNOLOGIST PROVIDED HISTORY: chest pain Decision Support Exception - unselect if not a suspected or confirmed emergency medical condition->Emergency Medical Condition (MA); ORDERING SYSTEM PROVIDED HISTORY: chest pain TECHNOLOGIST PROVIDED HISTORY: chest pain FINDINGS: CHEST: Vascular: Status post aortic root repair. Aortic annular calcification is noted. No acute thoracic aortic abnormality or dissection. Mediastinum: The pulmonary arteries are well opacified. No evidence for acute pulmonary embolism. No pericardial effusion. No lymphadenopathy. Lungs/pleura: Minimal linear opacities in the lung bases compatible subsegmental atelectasis or scar. Mild emphysematous disease and apical pleuroparenchymal scarring. No acute airspace disease, pneumothorax or effusion. The central airway is patent. Soft Tissues/Bones: No acute osseous abnormality identified in this region. No soft tissue hematoma. No acute findings identified in the upper abdomen. THORACIC: BONES/ALIGNMENT: Posterior cerclage wire and stabilization rods about the spinous processes of T4-T8 again demonstrated. Chronic T6 compression deformity appears unchanged. Remainder of the thoracic vertebral body heights are maintained. No traumatic malalignment. DEGENERATIVE CHANGES: No gross spinal canal stenosis or bony neural foraminal narrowing of the thoracic spine. SOFT TISSUES: No paraspinal hematoma. LUMBAR: BONES/ALIGNMENT: There is normal alignment of the spine. The vertebral body heights are maintained. No osseous destructive lesion is seen. DEGENERATIVE CHANGES: Facet arthropathy in the mid and lower lumbar spine with mild multilevel disc space narrowing SOFT TISSUES: In epidural abnormality is noted at the L1 level that also spans the L1-2 interspace. This could represent disc herniation with superior migration versus hematoma. *Unless otherwise specified, incidental findings do not require dedicated imaging follow-up. 1.  Status post aortic root repair.   No acute thoracic aortic abnormality or dissection. 2.  No acute airspace disease identified. 3.  Abnormality in the epidural space at the L1 level spanning the L1-2 disc space may represent disc herniation with superior migration. An epidural hematoma is also a consideration. 4.  No acute osseous abnormality identified in the thoracic or lumbar spine. CT LUMBAR SPINE WO CONTRAST    Result Date: 10/12/2022  EXAMINATION: CT OF THE LUMBAR SPINE WITHOUT CONTRAST; CT OF THE THORACIC SPINE WITHOUT CONTRAST; CTA OF THE CHEST WITH AND WITHOUT CONTRAST  10/12/2022 TECHNIQUE: CT of the lumbar spine was performed without the administration of intravenous contrast. Multiplanar reformatted images are provided for review. Adjustment of mA and/or kV according to patient size was utilized. Automated exposure control, iterative reconstruction, and/or weight based adjustment of the mA/kV was utilized to reduce the radiation dose to as low as reasonably achievable.; CT of the thoracic spine was performed without the administration of intravenous contrast. Multiplanar reformatted images are provided for review. Automated exposure control, iterative reconstruction, and/or weight based adjustment of the mA/kV was utilized to reduce the radiation dose to as low as reasonably achievable.; CTA of the chest was performed before and after the administration of intravenous contrast. Multiplanar reformatted images are provided for review. MIP images are provided for review. Automated exposure control, iterative reconstruction, and/or weight based adjustment of the mA/kV was utilized to reduce the radiation dose to as low as reasonably achievable. 3D reconstructed images were performed on a separate workstation and provided for review. COMPARISON: MRI thoracic spine 06/22/2022. Chest CT 01/29/2022. CT abdomen and pelvis 12/08/2020.  HISTORY: ORDERING SYSTEM PROVIDED HISTORY: chest pain TECHNOLOGIST PROVIDED HISTORY: chest pain Decision Support Exception - unselect if not a suspected or confirmed emergency medical condition->Emergency Medical Condition (MA); ORDERING SYSTEM PROVIDED HISTORY: chest pain TECHNOLOGIST PROVIDED HISTORY: chest pain FINDINGS: CHEST: Vascular: Status post aortic root repair. Aortic annular calcification is noted. No acute thoracic aortic abnormality or dissection. Mediastinum: The pulmonary arteries are well opacified. No evidence for acute pulmonary embolism. No pericardial effusion. No lymphadenopathy. Lungs/pleura: Minimal linear opacities in the lung bases compatible subsegmental atelectasis or scar. Mild emphysematous disease and apical pleuroparenchymal scarring. No acute airspace disease, pneumothorax or effusion. The central airway is patent. Soft Tissues/Bones: No acute osseous abnormality identified in this region. No soft tissue hematoma. No acute findings identified in the upper abdomen. THORACIC: BONES/ALIGNMENT: Posterior cerclage wire and stabilization rods about the spinous processes of T4-T8 again demonstrated. Chronic T6 compression deformity appears unchanged. Remainder of the thoracic vertebral body heights are maintained. No traumatic malalignment. DEGENERATIVE CHANGES: No gross spinal canal stenosis or bony neural foraminal narrowing of the thoracic spine. SOFT TISSUES: No paraspinal hematoma. LUMBAR: BONES/ALIGNMENT: There is normal alignment of the spine. The vertebral body heights are maintained. No osseous destructive lesion is seen. DEGENERATIVE CHANGES: Facet arthropathy in the mid and lower lumbar spine with mild multilevel disc space narrowing SOFT TISSUES: In epidural abnormality is noted at the L1 level that also spans the L1-2 interspace. This could represent disc herniation with superior migration versus hematoma. *Unless otherwise specified, incidental findings do not require dedicated imaging follow-up. 1.  Status post aortic root repair. No acute thoracic aortic abnormality or dissection. 2.  No acute airspace disease identified. 3.  Abnormality in the epidural space at the L1 level spanning the L1-2 disc space may represent disc herniation with superior migration. An epidural hematoma is also a consideration. 4.  No acute osseous abnormality identified in the thoracic or lumbar spine. XR CHEST PORTABLE    Result Date: 10/12/2022  EXAMINATION: ONE XRAY VIEW OF THE CHEST 10/12/2022 1:12 pm COMPARISON: March 23, 2019 HISTORY: ORDERING SYSTEM PROVIDED HISTORY: chest pain TECHNOLOGIST PROVIDED HISTORY: chest pain Reason for Exam: chest pain/  AP erect/ port. FINDINGS: The heart is not enlarged. No pulmonary venous congestion or edema. No focal lung consolidation or infiltrate. No pleural effusion or pneumothorax. Thoracic spine surgical hardware redemonstrated. No acute cardiopulmonary process     CT THORACIC SPINE TRAUMA RECONSTRUCTION    Result Date: 10/12/2022  EXAMINATION: CT OF THE LUMBAR SPINE WITHOUT CONTRAST; CT OF THE THORACIC SPINE WITHOUT CONTRAST; CTA OF THE CHEST WITH AND WITHOUT CONTRAST  10/12/2022 TECHNIQUE: CT of the lumbar spine was performed without the administration of intravenous contrast. Multiplanar reformatted images are provided for review. Adjustment of mA and/or kV according to patient size was utilized. Automated exposure control, iterative reconstruction, and/or weight based adjustment of the mA/kV was utilized to reduce the radiation dose to as low as reasonably achievable.; CT of the thoracic spine was performed without the administration of intravenous contrast. Multiplanar reformatted images are provided for review. Automated exposure control, iterative reconstruction, and/or weight based adjustment of the mA/kV was utilized to reduce the radiation dose to as low as reasonably achievable.; CTA of the chest was performed before and after the administration of intravenous contrast. Multiplanar reformatted images are provided for review.   MIP images are provided for review. Automated exposure control, iterative reconstruction, and/or weight based adjustment of the mA/kV was utilized to reduce the radiation dose to as low as reasonably achievable. 3D reconstructed images were performed on a separate workstation and provided for review. COMPARISON: MRI thoracic spine 06/22/2022. Chest CT 01/29/2022. CT abdomen and pelvis 12/08/2020. HISTORY: ORDERING SYSTEM PROVIDED HISTORY: chest pain TECHNOLOGIST PROVIDED HISTORY: chest pain Decision Support Exception - unselect if not a suspected or confirmed emergency medical condition->Emergency Medical Condition (MA); ORDERING SYSTEM PROVIDED HISTORY: chest pain TECHNOLOGIST PROVIDED HISTORY: chest pain FINDINGS: CHEST: Vascular: Status post aortic root repair. Aortic annular calcification is noted. No acute thoracic aortic abnormality or dissection. Mediastinum: The pulmonary arteries are well opacified. No evidence for acute pulmonary embolism. No pericardial effusion. No lymphadenopathy. Lungs/pleura: Minimal linear opacities in the lung bases compatible subsegmental atelectasis or scar. Mild emphysematous disease and apical pleuroparenchymal scarring. No acute airspace disease, pneumothorax or effusion. The central airway is patent. Soft Tissues/Bones: No acute osseous abnormality identified in this region. No soft tissue hematoma. No acute findings identified in the upper abdomen. THORACIC: BONES/ALIGNMENT: Posterior cerclage wire and stabilization rods about the spinous processes of T4-T8 again demonstrated. Chronic T6 compression deformity appears unchanged. Remainder of the thoracic vertebral body heights are maintained. No traumatic malalignment. DEGENERATIVE CHANGES: No gross spinal canal stenosis or bony neural foraminal narrowing of the thoracic spine. SOFT TISSUES: No paraspinal hematoma. LUMBAR: BONES/ALIGNMENT: There is normal alignment of the spine. The vertebral body heights are maintained.  No osseous destructive lesion is seen. DEGENERATIVE CHANGES: Facet arthropathy in the mid and lower lumbar spine with mild multilevel disc space narrowing SOFT TISSUES: In epidural abnormality is noted at the L1 level that also spans the L1-2 interspace. This could represent disc herniation with superior migration versus hematoma. *Unless otherwise specified, incidental findings do not require dedicated imaging follow-up. 1.  Status post aortic root repair. No acute thoracic aortic abnormality or dissection. 2.  No acute airspace disease identified. 3.  Abnormality in the epidural space at the L1 level spanning the L1-2 disc space may represent disc herniation with superior migration. An epidural hematoma is also a consideration. 4.  No acute osseous abnormality identified in the thoracic or lumbar spine. LABS:  I have reviewed and interpreted all available lab results.   Labs Reviewed   CBC WITH AUTO DIFFERENTIAL - Abnormal; Notable for the following components:       Result Value    Hematocrit 40.2 (*)     Seg Neutrophils 71 (*)     Lymphocytes 17 (*)     Immature Granulocytes 1 (*)     All other components within normal limits   COVID-19, RAPID   COMPREHENSIVE METABOLIC PANEL   TROPONIN   TROPONIN   IMMATURE PLATELET FRACTION   BASIC METABOLIC PANEL W/ REFLEX TO MG FOR LOW K   CBC WITH AUTO DIFFERENTIAL       SCREENING TOOLS:    HEART Risk Score for Chest Pain Patients  History and Physical Exam Suspicion Level  (Nausea, Vomiting, Diaphoresis, Radiation, Exertion)  Slightly Suspicious (0 pts)  Moderately Suspicious (1 pt)  Highly Suspicious (2 pts)  EKG Interpretation  Normal (0 pts)  Non-Specific Repolarization Disturbance (1 pt)  Significant ST-Depression (2 pts)  Age of Patient (in years)  = 39 (0 pts)  46-64 (1 pt)  = 65 (2 pts)  Risk Factors  No Risk Factors (0 pts)  1-2 Risk Factors (1 pt)  = 3 Risk Factors (2 pts)  Risk Factors Include:  Hypercholesterolemia  Hypertension  Diabetes Mellitus  Cigarette smoking  Positive family history  Obesity  CAD  (SLE, CKDz, HIV, Cocaine abuse)  Troponin Levels  = Normal Limit (0 pts)  1-3 Times Normal Limit (1 pt)  > 3 Times Normal Limit (2 pts)  TOTAL:    Percent Risk for Major Adverse Cardiac Event (MACE)  0-3 pts indicates low risk for MACE   2.5% (DISCHARGE)   4-7 pts indicates moderate risk for MACE  20.3% (OBS)  8-10 pts indicates high risk for MACE  72.7% (EARLY INVASIVE TX)    CDU IMPRESSION / Ross Kathleen is a 58 y.o. male who presents with complaints of back pain after trying to lift plywood and hearing a audible crunch sound. He also complains of left-sided chest pain that radiates to the left shoulder with associated numbness. He has a medical history of a aortic aneurysm with resection in 2012 and hypertension. Given clinical presentation will admit for further observation and evaluation by neurosurgery and cardiology. Inpatient consult to cardiology  Inpatient consult to neurosurgery  Continue home medications and pain control  Monitor vitals, labs, and imaging  DISPO: pending consults and clinical improvement    CONSULTS:    IP CONSULT TO CARDIOLOGY  IP CONSULT TO NEUROSURGERY    PROCEDURES:  Not indicated       PATIENT REFERRED TO:    No follow-up provider specified. --  My Supervising Physician for today is  Dr. Ambreen Patel  We discussed and agreed upon a treatment plan   Lashonda Coronado, [address] [place] [address]   Emergency Medicine Resident     This dictation was generated by voice recognition computer software. Although all attempts are made to edit the dictation for accuracy, there may be errors in the transcription that are not intended.

## 2022-10-12 NOTE — ED NOTES
The following labs labeled with pt sticker and tubed to lab:     [x] Blue     [x] Lavender   [] on ice  [x] Green/yellow  [x] Green/black [] on ice  [] Yellow  [] Red  [] Pink      [] COVID-19 swab    [] Rapid  [] PCR  [] Flu Swab  [] Strep Swab  [] Peds Viral Panel     [] Urine Sample  [] Pelvic Cultures  [] Blood Cultures   [] Wound Cultures          Norris Albert RN  10/12/22 1333

## 2022-10-12 NOTE — ED NOTES
TRANSFER - OUT REPORT:    Verbal report given to Haritha Cobian RN on Jass Deck  being transferred to [facility] for routine progression of patient care       Report consisted of patient's Situation, Background, Assessment and   Recommendations(SBAR). -Pt arrives for chronic worsening back pain and chest pain   -Pt denies new injury or trauma to back  -Pt states intermittent chest pain for some months and worse today   -Pt given 81mg Asprin   -Pt is supposed to take medications for hypertension, pt does not take any medications besides gabapentin 3x a day and percocet 3x a day   -Neurosurgery is consulted for CT spine results and cardiology is consulted for heart score   -Pt using urinal for elimination   -Axo x4, RR even and unlabored, bradycardic   - Regular diet       Information from the following report(s) Nurse Handoff Report was reviewed with the receiving nurse. Lines:   Peripheral IV 10/12/22 Left Antecubital (Active)   Site Assessment Clean, dry & intact 10/12/22 1254   Line Status Blood return noted;Normal saline locked; Flushed 10/12/22 1254   Phlebitis Assessment No symptoms 10/12/22 1254   Infiltration Assessment 0 10/12/22 1254   Dressing Status New dressing applied 10/12/22 1254   Dressing Type Transparent 10/12/22 1254   Dressing Intervention New 10/12/22 1254        Opportunity for questions and clarification was provided.       Patient transported with:  [employer] via [facility] on RA       Nikia Kearney RN  10/12/22 Tonny Mc RN  10/12/22 [id number]

## 2022-10-13 ENCOUNTER — APPOINTMENT (OUTPATIENT)
Dept: MRI IMAGING | Age: 62
End: 2022-10-13
Payer: MEDICARE

## 2022-10-13 VITALS
WEIGHT: 137 LBS | SYSTOLIC BLOOD PRESSURE: 152 MMHG | BODY MASS INDEX: 20.29 KG/M2 | RESPIRATION RATE: 18 BRPM | OXYGEN SATURATION: 97 % | HEIGHT: 69 IN | TEMPERATURE: 98 F | DIASTOLIC BLOOD PRESSURE: 103 MMHG | HEART RATE: 56 BPM

## 2022-10-13 LAB
ABSOLUTE EOS #: 0.23 K/UL (ref 0–0.44)
ABSOLUTE IMMATURE GRANULOCYTE: <0.03 K/UL (ref 0–0.3)
ABSOLUTE LYMPH #: 1.41 K/UL (ref 1.1–3.7)
ABSOLUTE MONO #: 0.48 K/UL (ref 0.1–1.2)
ANION GAP SERPL CALCULATED.3IONS-SCNC: 9 MMOL/L (ref 9–17)
BASOPHILS # BLD: 1 % (ref 0–2)
BASOPHILS ABSOLUTE: 0.08 K/UL (ref 0–0.2)
BUN BLDV-MCNC: 14 MG/DL (ref 8–23)
CALCIUM SERPL-MCNC: 8.4 MG/DL (ref 8.6–10.4)
CHLORIDE BLD-SCNC: 103 MMOL/L (ref 98–107)
CO2: 25 MMOL/L (ref 20–31)
CREAT SERPL-MCNC: 0.94 MG/DL (ref 0.7–1.2)
EKG ATRIAL RATE: 50 BPM
EKG ATRIAL RATE: 56 BPM
EKG ATRIAL RATE: 61 BPM
EKG P AXIS: 145 DEGREES
EKG P AXIS: 69 DEGREES
EKG P AXIS: 76 DEGREES
EKG P-R INTERVAL: 156 MS
EKG P-R INTERVAL: 164 MS
EKG P-R INTERVAL: 166 MS
EKG Q-T INTERVAL: 412 MS
EKG Q-T INTERVAL: 450 MS
EKG Q-T INTERVAL: 454 MS
EKG QRS DURATION: 100 MS
EKG QRS DURATION: 102 MS
EKG QRS DURATION: 92 MS
EKG QTC CALCULATION (BAZETT): 410 MS
EKG QTC CALCULATION (BAZETT): 414 MS
EKG QTC CALCULATION (BAZETT): 438 MS
EKG R AXIS: 105 DEGREES
EKG R AXIS: 36 DEGREES
EKG R AXIS: 51 DEGREES
EKG T AXIS: 50 DEGREES
EKG T AXIS: 64 DEGREES
EKG T AXIS: 7 DEGREES
EKG VENTRICULAR RATE: 50 BPM
EKG VENTRICULAR RATE: 56 BPM
EKG VENTRICULAR RATE: 61 BPM
EOSINOPHILS RELATIVE PERCENT: 4 % (ref 1–4)
GFR SERPL CREATININE-BSD FRML MDRD: >60 ML/MIN/1.73M2
GLUCOSE BLD-MCNC: 73 MG/DL (ref 70–99)
HCT VFR BLD CALC: 38.3 % (ref 40.7–50.3)
HEMOGLOBIN: 12.9 G/DL (ref 13–17)
IMMATURE GRANULOCYTES: 0 %
LYMPHOCYTES # BLD: 22 % (ref 24–43)
MCH RBC QN AUTO: 31 PG (ref 25.2–33.5)
MCHC RBC AUTO-ENTMCNC: 33.7 G/DL (ref 28.4–34.8)
MCV RBC AUTO: 92.1 FL (ref 82.6–102.9)
MONOCYTES # BLD: 7 % (ref 3–12)
NRBC AUTOMATED: 0 PER 100 WBC
PDW BLD-RTO: 14 % (ref 11.8–14.4)
PLATELET # BLD: 173 K/UL (ref 138–453)
PMV BLD AUTO: 10.8 FL (ref 8.1–13.5)
POTASSIUM SERPL-SCNC: 4.3 MMOL/L (ref 3.7–5.3)
RBC # BLD: 4.16 M/UL (ref 4.21–5.77)
SEG NEUTROPHILS: 66 % (ref 36–65)
SEGMENTED NEUTROPHILS ABSOLUTE COUNT: 4.24 K/UL (ref 1.5–8.1)
SODIUM BLD-SCNC: 137 MMOL/L (ref 135–144)
WBC # BLD: 6.5 K/UL (ref 3.5–11.3)

## 2022-10-13 PROCEDURE — G0378 HOSPITAL OBSERVATION PER HR: HCPCS

## 2022-10-13 PROCEDURE — 93010 ELECTROCARDIOGRAM REPORT: CPT | Performed by: INTERNAL MEDICINE

## 2022-10-13 PROCEDURE — 6370000000 HC RX 637 (ALT 250 FOR IP): Performed by: STUDENT IN AN ORGANIZED HEALTH CARE EDUCATION/TRAINING PROGRAM

## 2022-10-13 PROCEDURE — 72148 MRI LUMBAR SPINE W/O DYE: CPT

## 2022-10-13 PROCEDURE — 2580000003 HC RX 258: Performed by: STUDENT IN AN ORGANIZED HEALTH CARE EDUCATION/TRAINING PROGRAM

## 2022-10-13 PROCEDURE — 96372 THER/PROPH/DIAG INJ SC/IM: CPT

## 2022-10-13 PROCEDURE — 72146 MRI CHEST SPINE W/O DYE: CPT

## 2022-10-13 PROCEDURE — 36415 COLL VENOUS BLD VENIPUNCTURE: CPT

## 2022-10-13 PROCEDURE — 97530 THERAPEUTIC ACTIVITIES: CPT

## 2022-10-13 PROCEDURE — 6370000000 HC RX 637 (ALT 250 FOR IP): Performed by: EMERGENCY MEDICINE

## 2022-10-13 PROCEDURE — 94761 N-INVAS EAR/PLS OXIMETRY MLT: CPT

## 2022-10-13 PROCEDURE — 96376 TX/PRO/DX INJ SAME DRUG ADON: CPT

## 2022-10-13 PROCEDURE — 80048 BASIC METABOLIC PNL TOTAL CA: CPT

## 2022-10-13 PROCEDURE — 6360000002 HC RX W HCPCS: Performed by: STUDENT IN AN ORGANIZED HEALTH CARE EDUCATION/TRAINING PROGRAM

## 2022-10-13 PROCEDURE — 93005 ELECTROCARDIOGRAM TRACING: CPT | Performed by: EMERGENCY MEDICINE

## 2022-10-13 PROCEDURE — 94640 AIRWAY INHALATION TREATMENT: CPT

## 2022-10-13 PROCEDURE — 85025 COMPLETE CBC W/AUTO DIFF WBC: CPT

## 2022-10-13 PROCEDURE — 6360000002 HC RX W HCPCS

## 2022-10-13 PROCEDURE — 97116 GAIT TRAINING THERAPY: CPT

## 2022-10-13 PROCEDURE — 97162 PT EVAL MOD COMPLEX 30 MIN: CPT

## 2022-10-13 RX ORDER — OXYCODONE HYDROCHLORIDE 5 MG/1
10 TABLET ORAL EVERY 4 HOURS PRN
Status: DISCONTINUED | OUTPATIENT
Start: 2022-10-13 | End: 2022-10-13

## 2022-10-13 RX ORDER — OXYCODONE HYDROCHLORIDE 5 MG/1
10 TABLET ORAL EVERY 4 HOURS PRN
Status: DISCONTINUED | OUTPATIENT
Start: 2022-10-13 | End: 2022-10-13 | Stop reason: HOSPADM

## 2022-10-13 RX ORDER — GABAPENTIN 800 MG/1
800 TABLET ORAL EVERY 6 HOURS
Status: DISCONTINUED | OUTPATIENT
Start: 2022-10-13 | End: 2022-10-13 | Stop reason: HOSPADM

## 2022-10-13 RX ORDER — OXYCODONE HYDROCHLORIDE 10 MG/1
10 TABLET ORAL EVERY 4 HOURS PRN
Qty: 60 TABLET | Refills: 0 | Status: SHIPPED | OUTPATIENT
Start: 2022-10-13 | End: 2022-10-23

## 2022-10-13 RX ORDER — AMLODIPINE BESYLATE 5 MG/1
5 TABLET ORAL DAILY
Status: DISCONTINUED | OUTPATIENT
Start: 2022-10-13 | End: 2022-10-13 | Stop reason: HOSPADM

## 2022-10-13 RX ORDER — LOSARTAN POTASSIUM 50 MG/1
100 TABLET ORAL DAILY
Status: DISCONTINUED | OUTPATIENT
Start: 2022-10-14 | End: 2022-10-13 | Stop reason: HOSPADM

## 2022-10-13 RX ORDER — MORPHINE SULFATE 4 MG/ML
4 INJECTION, SOLUTION INTRAMUSCULAR; INTRAVENOUS ONCE
Status: COMPLETED | OUTPATIENT
Start: 2022-10-13 | End: 2022-10-13

## 2022-10-13 RX ADMIN — AMLODIPINE BESYLATE 5 MG: 5 TABLET ORAL at 10:26

## 2022-10-13 RX ADMIN — ENOXAPARIN SODIUM 40 MG: 100 INJECTION SUBCUTANEOUS at 07:56

## 2022-10-13 RX ADMIN — OXYCODONE 10 MG: 5 TABLET ORAL at 15:17

## 2022-10-13 RX ADMIN — ASPIRIN 81 MG: 81 TABLET, CHEWABLE ORAL at 07:56

## 2022-10-13 RX ADMIN — GABAPENTIN 800 MG: 800 TABLET ORAL at 04:11

## 2022-10-13 RX ADMIN — OXYCODONE HYDROCHLORIDE AND ACETAMINOPHEN 1 TABLET: 5; 325 TABLET ORAL at 03:24

## 2022-10-13 RX ADMIN — OXYCODONE 10 MG: 5 TABLET ORAL at 10:26

## 2022-10-13 RX ADMIN — LOSARTAN POTASSIUM 50 MG: 50 TABLET, FILM COATED ORAL at 07:56

## 2022-10-13 RX ADMIN — TIOTROPIUM BROMIDE INHALATION SPRAY 2 PUFF: 3.12 SPRAY, METERED RESPIRATORY (INHALATION) at 08:32

## 2022-10-13 RX ADMIN — MORPHINE SULFATE 4 MG: 4 INJECTION INTRAVENOUS at 07:57

## 2022-10-13 RX ADMIN — GABAPENTIN 800 MG: 800 TABLET ORAL at 10:26

## 2022-10-13 RX ADMIN — SODIUM CHLORIDE, PRESERVATIVE FREE 10 ML: 5 INJECTION INTRAVENOUS at 07:58

## 2022-10-13 ASSESSMENT — PAIN SCALES - GENERAL
PAINLEVEL_OUTOF10: 9
PAINLEVEL_OUTOF10: 8
PAINLEVEL_OUTOF10: 9
PAINLEVEL_OUTOF10: 9

## 2022-10-13 ASSESSMENT — PAIN DESCRIPTION - LOCATION
LOCATION: BACK

## 2022-10-13 ASSESSMENT — PAIN DESCRIPTION - DESCRIPTORS: DESCRIPTORS: SHARP;CRAMPING;SHOOTING

## 2022-10-13 ASSESSMENT — PAIN DESCRIPTION - ORIENTATION: ORIENTATION: LOWER
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
## 2022-10-13 NOTE — CARE COORDINATION
SBIRT completed with pt. Pt admitted after he experienced pain in his back while moving plywood into his truck. Pt denies alcohol use and admits to smoking marijuana regularly. Pt denies depression and states he stays in a good place mentally. Alcohol Screening and Brief Intervention        No results for input(s): ALC in the last 72 hours. Alcohol Pre-screening  (MEN ONLY) How many times in the past year have you had 5 or more drinks in a day?: None       Alcohol Screening Audit       Drug Pre-Screening   How many times in the past year have you used a recreational drug or used a prescription medication for nonmedical reasons?: 1 or more    Drug Screening DAST  TOTAL SCORE[de-identified] 2    Mood Pre-Screening (PHQ-2)  During the past two weeks, have you been bothered by little interest or pleasure in doing things?: No  During the past two weeks, have you been bothered by feeling down, depressed, or hopeless?: No    Mood Pre-Screening (PHQ-9)         I have interviewed Deepa Florentino, [id number] regarding  His alcohol consumption/drug use and risk for excessive use. Screenings were negative. Patient  N/A intervention at this time. Please see social work note regarding intervention.     Deferred []    Mark SKINNER Intern     Completed on: 10/13/2022   DONNIE Malave

## 2022-10-13 NOTE — PROGRESS NOTES
[facility]  CDU / OBSERVATION ENCOUNTER  ATTENDING NOTE       I performed a history and physical examination of the patient and discussed management with the resident or midlevel provider. I reviewed the resident or midlevel provider's note and agree with the documented findings and plan of care. Any areas of disagreement are noted on the chart. I was personally present for the key portions of any procedures. I have documented in the chart those procedures where I was not present during the key portions. I have reviewed the nurses notes. I agree with the chief complaint, past medical history, past surgical history, allergies, medications, social and family history as documented unless otherwise noted below. The Family history, social history, and ROS are effectively unchanged since admission unless noted elsewhere in the chart. Patient with back pain. Patient here for cardiology evaluation secondary to history of aneurysm. Seen by cardiologist.  Mechanical reason for back discomfort. Patient does not have anginal type discomfort. Seen by cardiology for rule out. Cardiology suggested medication changes but does not feel further cardiac testing is necessary. Given patient's location of pain and difficulty we will get prior spine. Patient has history of back problems. Patient given analgesics with relief. Patient for further reevaluation after MRI. Patient obtaining pain relief with analgesics. If improved will discharge if MRI negative.     Mark Godoy MD  Attending Emergency  Physician

## 2022-10-13 NOTE — PLAN OF CARE
Problem: Respiratory - Adult  Goal: Achieves optimal ventilation and oxygenation  Outcome: Progressing   BRONCHOSPASM/BRONCHOCONSTRICTION     [x]         IMPROVE AERATION/BREATH SOUNDS  [x]   ADMINISTER BRONCHODILATOR THERAPY AS APPROPRIATE  [x]   ASSESS BREATH SOUNDS  []   IMPLEMENT AEROSOL/MDI PROTOCOL  [x]   PATIENT EDUCATION AS NEEDED   PROVIDE ADEQUATE OXYGENATION WITH ACCEPTABLE SP02/ABG'S    [x]  IDENTIFY APPROPRIATE OXYGEN THERAPY  [x]   MONITOR SP02/ABG'S AS NEEDED   [x]   PATIENT EDUCATION AS NEEDED

## 2022-10-13 NOTE — DISCHARGE SUMMARY
CDU Discharge Summary        Patient:  Braydon Graves  YOB: 1960    MRN: [id number]   Acct: [de-identified]    Primary Care Physician: TAYLOR Erickson CNP    Admit date:  10/12/2022 12:08 PM  Discharge date: 10/13/2022    Discharge Diagnoses:     1.)  Acute on chronic back pain and new onset chest pain secondary to lifting heavy plywood. Improved with analgesics and rest    Follow-up:  Call today/tomorrow for a follow up appointment with TAYLOR Erickson CNP , or return to the Emergency Room with worsening symptoms    Stressed to patient the importance of following up with primary care doctor for further workup/management of symptoms. Pt verbalizes understanding and agrees with plan. Discharge Medication Changes:       Medication List        START taking these medications      oxyCODONE HCl 10 MG immediate release tablet  Commonly known as: OXY-IR  Take 1 tablet by mouth every 4 hours as needed for Pain for up to 10 days. CHANGE how you take these medications      amLODIPine 5 MG tablet  Commonly known as: NORVASC  What changed: Another medication with the same name was removed. Continue taking this medication, and follow the directions you see here. losartan 25 MG tablet  Commonly known as: COZAAR  What changed: Another medication with the same name was removed. Continue taking this medication, and follow the directions you see here. CONTINUE taking these medications      alfuzosin 10 MG extended release tablet  Commonly known as: Uroxatral  Take 1 tablet by mouth daily     Blood Pressure Kit  Diagnosis: HTN. Needs to check blood pressure 1-2 times a day until stable, then once a day. Goal blood pressure less than 135/85, and above 110/60.     eucerin cream  Apply topically twice daily.      gabapentin 800 MG tablet  Commonly known as: NEURONTIN  TAKE ONE TABLET BY MOUTH FOUR TIMES A DAY     oxyCODONE-acetaminophen 5-325 MG per tablet  Commonly known as: Percocet  Take 1 tablet by mouth every 8 hours as needed for Pain for up to 30 days. pyridoxine 50 MG tablet  Commonly known as: RA Vitamin B-6  Take 1 tablet by mouth daily     vitamin D 1.25 MG (94594 UT) Caps capsule  Commonly known as: ERGOCALCIFEROL  Take 1 capsule by mouth once a week            ASK your doctor about these medications      * albuterol (2.5 MG/3ML) 0.083% nebulizer solution  Commonly known as: PROVENTIL  Take 3 mLs by nebulization every 6 hours as needed for Wheezing     * albuterol sulfate  (90 Base) MCG/ACT inhaler  Commonly known as: Proventil HFA  Inhale 2 puffs into the lungs every 4 hours as needed for Wheezing or Shortness of Breath     amitriptyline 25 MG tablet  Commonly known as: ELAVIL  Take 1 tablet by mouth nightly     aspirin 81 MG chewable tablet  Take 1 tablet by mouth daily     tiotropium 2.5 MCG/ACT Aers inhaler  Commonly known as: SPIRIVA RESPIMAT           * This list has 2 medication(s) that are the same as other medications prescribed for you. Read the directions carefully, and ask your doctor or other care provider to review them with you. Where to Get Your Medications        You can get these medications from any pharmacy    Bring a paper prescription for each of these medications  oxyCODONE HCl 10 MG immediate release tablet         Diet:  ADULT DIET; Regular, advance as tolerated     Activity:  As tolerated    Consultants: IP CONSULT TO CARDIOLOGY  IP CONSULT TO NEUROSURGERY    Procedures:  Not indicated     Diagnostic Test:   Results for orders placed or performed during the hospital encounter of 10/12/22   COVID-19, Rapid    Specimen: Nasopharyngeal Swab   Result Value Ref Range    Specimen Description . NASOPHARYNGEAL SWAB     SARS-CoV-2, Rapid Not Detected Not Detected   Comprehensive Metabolic Panel   Result Value Ref Range    Glucose 87 70 - 99 mg/dL    BUN 11 8 - 23 mg/dL    Creatinine 0.92 0.70 - 1.20 mg/dL    Est, Glom Filt Rate >60 >60 mL/min/1.73m2    Calcium 8.7 8.6 - 10.4 mg/dL    Sodium 135 135 - 144 mmol/L    Potassium 4.4 3.7 - 5.3 mmol/L    Chloride 98 98 - 107 mmol/L    CO2 25 20 - 31 mmol/L    Anion Gap 12 9 - 17 mmol/L    Alkaline Phosphatase 71 40 - 129 U/L    ALT 11 5 - 41 U/L    AST 22 <40 U/L    Total Bilirubin 0.6 0.3 - 1.2 mg/dL    Total Protein 7.1 6.4 - 8.3 g/dL    Albumin 4.2 3.5 - 5.2 g/dL    Albumin/Globulin Ratio 1.4 1.0 - 2.5   Troponin   Result Value Ref Range    Troponin, High Sensitivity 10 0 - 22 ng/L   Troponin   Result Value Ref Range    Troponin, High Sensitivity 10 0 - 22 ng/L   CBC with Auto Differential   Result Value Ref Range    WBC 8.8 3.5 - 11.3 k/uL    RBC 4.32 4.21 - 5.77 m/uL    Hemoglobin 13.6 13.0 - 17.0 g/dL    Hematocrit 40.2 (L) 40.7 - 50.3 %    MCV 93.1 82.6 - 102.9 fL    MCH 31.5 25.2 - 33.5 pg    MCHC 33.8 28.4 - 34.8 g/dL    RDW 13.7 11.8 - 14.4 %    Platelets See Reflexed IPF Result 138 - 453 k/uL    NRBC Automated 0.0 0.0 per 100 WBC    Seg Neutrophils 71 (H) 36 - 65 %    Lymphocytes 17 (L) 24 - 43 %    Monocytes 8 3 - 12 %    Eosinophils % 2 1 - 4 %    Basophils 1 0 - 2 %    Immature Granulocytes 1 (H) 0 %    Segs Absolute 6.30 1.50 - 8.10 k/uL    Absolute Lymph # 1.51 1.10 - 3.70 k/uL    Absolute Mono # 0.67 0.10 - 1.20 k/uL    Absolute Eos # 0.21 0.00 - 0.44 k/uL    Basophils Absolute 0.10 0.00 - 0.20 k/uL    Absolute Immature Granulocyte 0.04 0.00 - 0.30 k/uL   Immature Platelet Fraction   Result Value Ref Range    Platelet, Fluorescence Platelet clumps present, count appears adequate.  138 - 453 k/uL   Basic Metabolic Panel w/ Reflex to MG   Result Value Ref Range    Glucose 73 70 - 99 mg/dL    BUN 14 8 - 23 mg/dL    Creatinine 0.94 0.70 - 1.20 mg/dL    Est, Glom Filt Rate >60 >60 mL/min/1.73m2    Calcium 8.4 (L) 8.6 - 10.4 mg/dL    Sodium 137 135 - 144 mmol/L    Potassium 4.3 3.7 - 5.3 mmol/L    Chloride 103 98 - 107 mmol/L    CO2 25 20 - 31 mmol/L    Anion Gap 9 9 - 17 mmol/L   CBC with Auto Differential   Result Value Ref Range    WBC 6.5 3.5 - 11.3 k/uL    RBC 4.16 (L) 4.21 - 5.77 m/uL    Hemoglobin 12.9 (L) 13.0 - 17.0 g/dL    Hematocrit 38.3 (L) 40.7 - 50.3 %    MCV 92.1 82.6 - 102.9 fL    MCH 31.0 25.2 - 33.5 pg    MCHC 33.7 28.4 - 34.8 g/dL    RDW 14.0 11.8 - 14.4 %    Platelets 451 968 - 590 k/uL    MPV 10.8 8.1 - 13.5 fL    NRBC Automated 0.0 0.0 per 100 WBC    Seg Neutrophils 66 (H) 36 - 65 %    Lymphocytes 22 (L) 24 - 43 %    Monocytes 7 3 - 12 %    Eosinophils % 4 1 - 4 %    Basophils 1 0 - 2 %    Immature Granulocytes 0 0 %    Segs Absolute 4.24 1.50 - 8.10 k/uL    Absolute Lymph # 1.41 1.10 - 3.70 k/uL    Absolute Mono # 0.48 0.10 - 1.20 k/uL    Absolute Eos # 0.23 0.00 - 0.44 k/uL    Basophils Absolute 0.08 0.00 - 0.20 k/uL    Absolute Immature Granulocyte <0.03 0.00 - 0.30 k/uL   EKG 12 Lead   Result Value Ref Range    Ventricular Rate 61 BPM    Atrial Rate 61 BPM    P-R Interval 156 ms    QRS Duration 102 ms    Q-T Interval 412 ms    QTc Calculation (Bazett) 414 ms    P Axis 76 degrees    R Axis 36 degrees    T Axis 7 degrees   EKG 12 Lead   Result Value Ref Range    Ventricular Rate 50 BPM    Atrial Rate 50 BPM    P-R Interval 164 ms    QRS Duration 100 ms    Q-T Interval 450 ms    QTc Calculation (Bazett) 410 ms    P Axis 145 degrees    R Axis 105 degrees    T Axis 64 degrees   EKG 12 Lead   Result Value Ref Range    Ventricular Rate 56 BPM    Atrial Rate 56 BPM    P-R Interval 166 ms    QRS Duration 92 ms    Q-T Interval 454 ms    QTc Calculation (Bazett) 438 ms    P Axis 69 degrees    R Axis 51 degrees    T Axis 50 degrees     CTA CHEST W WO CONTRAST    Result Date: 10/12/2022  EXAMINATION: CT OF THE LUMBAR SPINE WITHOUT CONTRAST; CT OF THE THORACIC SPINE WITHOUT CONTRAST; CTA OF THE CHEST WITH AND WITHOUT CONTRAST  10/12/2022 TECHNIQUE: CT of the lumbar spine was performed without the administration of intravenous contrast. Multiplanar reformatted images are provided for review. Adjustment of mA and/or kV according to patient size was utilized. Automated exposure control, iterative reconstruction, and/or weight based adjustment of the mA/kV was utilized to reduce the radiation dose to as low as reasonably achievable.; CT of the thoracic spine was performed without the administration of intravenous contrast. Multiplanar reformatted images are provided for review. Automated exposure control, iterative reconstruction, and/or weight based adjustment of the mA/kV was utilized to reduce the radiation dose to as low as reasonably achievable.; CTA of the chest was performed before and after the administration of intravenous contrast. Multiplanar reformatted images are provided for review. MIP images are provided for review. Automated exposure control, iterative reconstruction, and/or weight based adjustment of the mA/kV was utilized to reduce the radiation dose to as low as reasonably achievable. 3D reconstructed images were performed on a separate workstation and provided for review. COMPARISON: MRI thoracic spine 06/22/2022. Chest CT 01/29/2022. CT abdomen and pelvis 12/08/2020. HISTORY: ORDERING SYSTEM PROVIDED HISTORY: chest pain TECHNOLOGIST PROVIDED HISTORY: chest pain Decision Support Exception - unselect if not a suspected or confirmed emergency medical condition->Emergency Medical Condition (MA); ORDERING SYSTEM PROVIDED HISTORY: chest pain TECHNOLOGIST PROVIDED HISTORY: chest pain FINDINGS: CHEST: Vascular: Status post aortic root repair. Aortic annular calcification is noted. No acute thoracic aortic abnormality or dissection. Mediastinum: The pulmonary arteries are well opacified. No evidence for acute pulmonary embolism. No pericardial effusion. No lymphadenopathy. Lungs/pleura: Minimal linear opacities in the lung bases compatible subsegmental atelectasis or scar. Mild emphysematous disease and apical pleuroparenchymal scarring.   No acute airspace disease, pneumothorax or effusion. The central airway is patent. Soft Tissues/Bones: No acute osseous abnormality identified in this region. No soft tissue hematoma. No acute findings identified in the upper abdomen. THORACIC: BONES/ALIGNMENT: Posterior cerclage wire and stabilization rods about the spinous processes of T4-T8 again demonstrated. Chronic T6 compression deformity appears unchanged. Remainder of the thoracic vertebral body heights are maintained. No traumatic malalignment. DEGENERATIVE CHANGES: No gross spinal canal stenosis or bony neural foraminal narrowing of the thoracic spine. SOFT TISSUES: No paraspinal hematoma. LUMBAR: BONES/ALIGNMENT: There is normal alignment of the spine. The vertebral body heights are maintained. No osseous destructive lesion is seen. DEGENERATIVE CHANGES: Facet arthropathy in the mid and lower lumbar spine with mild multilevel disc space narrowing SOFT TISSUES: In epidural abnormality is noted at the L1 level that also spans the L1-2 interspace. This could represent disc herniation with superior migration versus hematoma. *Unless otherwise specified, incidental findings do not require dedicated imaging follow-up. 1.  Status post aortic root repair. No acute thoracic aortic abnormality or dissection. 2.  No acute airspace disease identified. 3.  Abnormality in the epidural space at the L1 level spanning the L1-2 disc space may represent disc herniation with superior migration. An epidural hematoma is also a consideration. 4.  No acute osseous abnormality identified in the thoracic or lumbar spine. CT LUMBAR SPINE WO CONTRAST    Result Date: 10/12/2022  EXAMINATION: CT OF THE LUMBAR SPINE WITHOUT CONTRAST; CT OF THE THORACIC SPINE WITHOUT CONTRAST; CTA OF THE CHEST WITH AND WITHOUT CONTRAST  10/12/2022 TECHNIQUE: CT of the lumbar spine was performed without the administration of intravenous contrast. Multiplanar reformatted images are provided for review.  Adjustment of mA and/or kV according to patient size was utilized. Automated exposure control, iterative reconstruction, and/or weight based adjustment of the mA/kV was utilized to reduce the radiation dose to as low as reasonably achievable.; CT of the thoracic spine was performed without the administration of intravenous contrast. Multiplanar reformatted images are provided for review. Automated exposure control, iterative reconstruction, and/or weight based adjustment of the mA/kV was utilized to reduce the radiation dose to as low as reasonably achievable.; CTA of the chest was performed before and after the administration of intravenous contrast. Multiplanar reformatted images are provided for review. MIP images are provided for review. Automated exposure control, iterative reconstruction, and/or weight based adjustment of the mA/kV was utilized to reduce the radiation dose to as low as reasonably achievable. 3D reconstructed images were performed on a separate workstation and provided for review. COMPARISON: MRI thoracic spine 06/22/2022. Chest CT 01/29/2022. CT abdomen and pelvis 12/08/2020. HISTORY: ORDERING SYSTEM PROVIDED HISTORY: chest pain TECHNOLOGIST PROVIDED HISTORY: chest pain Decision Support Exception - unselect if not a suspected or confirmed emergency medical condition->Emergency Medical Condition (MA); ORDERING SYSTEM PROVIDED HISTORY: chest pain TECHNOLOGIST PROVIDED HISTORY: chest pain FINDINGS: CHEST: Vascular: Status post aortic root repair. Aortic annular calcification is noted. No acute thoracic aortic abnormality or dissection. Mediastinum: The pulmonary arteries are well opacified. No evidence for acute pulmonary embolism. No pericardial effusion. No lymphadenopathy. Lungs/pleura: Minimal linear opacities in the lung bases compatible subsegmental atelectasis or scar. Mild emphysematous disease and apical pleuroparenchymal scarring. No acute airspace disease, pneumothorax or effusion.   The central airway is patent. Soft Tissues/Bones: No acute osseous abnormality identified in this region. No soft tissue hematoma. No acute findings identified in the upper abdomen. THORACIC: BONES/ALIGNMENT: Posterior cerclage wire and stabilization rods about the spinous processes of T4-T8 again demonstrated. Chronic T6 compression deformity appears unchanged. Remainder of the thoracic vertebral body heights are maintained. No traumatic malalignment. DEGENERATIVE CHANGES: No gross spinal canal stenosis or bony neural foraminal narrowing of the thoracic spine. SOFT TISSUES: No paraspinal hematoma. LUMBAR: BONES/ALIGNMENT: There is normal alignment of the spine. The vertebral body heights are maintained. No osseous destructive lesion is seen. DEGENERATIVE CHANGES: Facet arthropathy in the mid and lower lumbar spine with mild multilevel disc space narrowing SOFT TISSUES: In epidural abnormality is noted at the L1 level that also spans the L1-2 interspace. This could represent disc herniation with superior migration versus hematoma. *Unless otherwise specified, incidental findings do not require dedicated imaging follow-up. 1.  Status post aortic root repair. No acute thoracic aortic abnormality or dissection. 2.  No acute airspace disease identified. 3.  Abnormality in the epidural space at the L1 level spanning the L1-2 disc space may represent disc herniation with superior migration. An epidural hematoma is also a consideration. 4.  No acute osseous abnormality identified in the thoracic or lumbar spine. XR CHEST PORTABLE    Result Date: 10/12/2022  EXAMINATION: ONE XRAY VIEW OF THE CHEST 10/12/2022 1:12 pm COMPARISON: March 23, 2019 HISTORY: ORDERING SYSTEM PROVIDED HISTORY: chest pain TECHNOLOGIST PROVIDED HISTORY: chest pain Reason for Exam: chest pain/  AP erect/ port. FINDINGS: The heart is not enlarged. No pulmonary venous congestion or edema. No focal lung consolidation or infiltrate.   No pleural effusion or pneumothorax. Thoracic spine surgical hardware redemonstrated. No acute cardiopulmonary process     CT THORACIC SPINE TRAUMA RECONSTRUCTION    Result Date: 10/12/2022  EXAMINATION: CT OF THE LUMBAR SPINE WITHOUT CONTRAST; CT OF THE THORACIC SPINE WITHOUT CONTRAST; CTA OF THE CHEST WITH AND WITHOUT CONTRAST  10/12/2022 TECHNIQUE: CT of the lumbar spine was performed without the administration of intravenous contrast. Multiplanar reformatted images are provided for review. Adjustment of mA and/or kV according to patient size was utilized. Automated exposure control, iterative reconstruction, and/or weight based adjustment of the mA/kV was utilized to reduce the radiation dose to as low as reasonably achievable.; CT of the thoracic spine was performed without the administration of intravenous contrast. Multiplanar reformatted images are provided for review. Automated exposure control, iterative reconstruction, and/or weight based adjustment of the mA/kV was utilized to reduce the radiation dose to as low as reasonably achievable.; CTA of the chest was performed before and after the administration of intravenous contrast. Multiplanar reformatted images are provided for review. MIP images are provided for review. Automated exposure control, iterative reconstruction, and/or weight based adjustment of the mA/kV was utilized to reduce the radiation dose to as low as reasonably achievable. 3D reconstructed images were performed on a separate workstation and provided for review. COMPARISON: MRI thoracic spine 06/22/2022. Chest CT 01/29/2022. CT abdomen and pelvis 12/08/2020.  HISTORY: ORDERING SYSTEM PROVIDED HISTORY: chest pain TECHNOLOGIST PROVIDED HISTORY: chest pain Decision Support Exception - unselect if not a suspected or confirmed emergency medical condition->Emergency Medical Condition (MA); ORDERING SYSTEM PROVIDED HISTORY: chest pain TECHNOLOGIST PROVIDED HISTORY: chest pain FINDINGS: CHEST: Vascular: Status post aortic root repair. Aortic annular calcification is noted. No acute thoracic aortic abnormality or dissection. Mediastinum: The pulmonary arteries are well opacified. No evidence for acute pulmonary embolism. No pericardial effusion. No lymphadenopathy. Lungs/pleura: Minimal linear opacities in the lung bases compatible subsegmental atelectasis or scar. Mild emphysematous disease and apical pleuroparenchymal scarring. No acute airspace disease, pneumothorax or effusion. The central airway is patent. Soft Tissues/Bones: No acute osseous abnormality identified in this region. No soft tissue hematoma. No acute findings identified in the upper abdomen. THORACIC: BONES/ALIGNMENT: Posterior cerclage wire and stabilization rods about the spinous processes of T4-T8 again demonstrated. Chronic T6 compression deformity appears unchanged. Remainder of the thoracic vertebral body heights are maintained. No traumatic malalignment. DEGENERATIVE CHANGES: No gross spinal canal stenosis or bony neural foraminal narrowing of the thoracic spine. SOFT TISSUES: No paraspinal hematoma. LUMBAR: BONES/ALIGNMENT: There is normal alignment of the spine. The vertebral body heights are maintained. No osseous destructive lesion is seen. DEGENERATIVE CHANGES: Facet arthropathy in the mid and lower lumbar spine with mild multilevel disc space narrowing SOFT TISSUES: In epidural abnormality is noted at the L1 level that also spans the L1-2 interspace. This could represent disc herniation with superior migration versus hematoma. *Unless otherwise specified, incidental findings do not require dedicated imaging follow-up. 1.  Status post aortic root repair. No acute thoracic aortic abnormality or dissection. 2.  No acute airspace disease identified. 3.  Abnormality in the epidural space at the L1 level spanning the L1-2 disc space may represent disc herniation with superior migration.   An epidural hematoma is also a consideration. 4.  No acute osseous abnormality identified in the thoracic or lumbar spine. Physical Exam:    General appearance - NAD, AOx 3   Lungs -CTAB, no R/R/R  Heart - RRR, no M/R/G  Abdomen - Soft, NT/ND  Neurological:  MAEx4, No focal motor deficit, sensory loss  Extremities - Cap refil <2 sec in all ext., no edema  Skin -warm, dry      Hospital Course:  Clinical course has improved, labs and imaging reviewed. Alessio Vizcarra originally presented to the hospital on 10/12/2022 12:08 PM with complaints of acute on chronic back pain exacerbated by lifting heavy plywood and then later developed left side chest pain. At that time it was determined that He required further observation and cardiac and neurosurgery evaluation. Labs and imaging were followed daily. Imaging results as above. He is medically stable to be discharged. Patient given extensive education and to follow-up with PCP within 1 week and to call and make a follow-up appointment with neurosurgery for further evaluation as he has a history of thoracic injury. MRIs are pending but patient is stable to be discharged. Patient educated that he will be given a phone call if there is anything concerning on MRIs but he should be sure to make both appointments with PCP and neurosurgery. Patient given analgesics with education on use and instructed to return to the emergency department with any returning or worsening symptoms. Disposition: Home    Patient stated that they will not drive themselves home from the hospital if they have gotten pain killers/ narcotics earlier that day and that they will arrange for transportation on their own or work with the  for a ride. Patient counseled NOT to drive while under the influence of narcotics/ pain killers. Condition: Good    Patient stable and ready for discharge home. I have discussed plan of care with patient and they are in understanding.  They were instructed to read discharge paperwork. All of their questions and concerns were addressed. Time Spent: 0 day      --  My Supervising Physician for today is  Dr. Shea Lucio  We discussed and agreed upon a treatment plan   Shanon Gallagher, [address] [facility]  Emergency Medicine Attending Physician    This dictation was generated by voice recognition computer software. Although all attempts are made to edit the dictation for accuracy, there may be errors in the transcription that are not intended.

## 2022-10-13 NOTE — PLAN OF CARE
Problem: Pain  Goal: Verbalizes/displays adequate comfort level or baseline comfort level  Outcome: Progressing     Problem: Safety - Adult  Goal: Free from fall injury  Outcome: Progressing     Problem: Respiratory - Adult  Goal: Achieves optimal ventilation and oxygenation  10/13/2022 1442 by Autumn Quiles RN  Outcome: Progressing  10/13/2022 0953 by Candance Mess, RCP  Outcome: Progressing  Flowsheets (Taken 10/13/2022 0800 by Autumn Quiles RN)  Achieves optimal ventilation and oxygenation: Assess for changes in respiratory status

## 2022-10-13 NOTE — PLAN OF CARE
Problem: Pain  Goal: Verbalizes/displays adequate comfort level or baseline comfort level  10/13/2022 1847 by Los Pollack RN  Outcome: Adequate for Discharge  10/13/2022 1442 by Los Pollack RN  Outcome: Progressing     Problem: Safety - Adult  Goal: Free from fall injury  10/13/2022 1847 by Los Pollack RN  Outcome: Adequate for Discharge  10/13/2022 1442 by Los Pollack RN  Outcome: Progressing     Problem: Respiratory - Adult  Goal: Achieves optimal ventilation and oxygenation  10/13/2022 1847 by Los Pollack RN  Outcome: Adequate for Discharge  10/13/2022 1442 by Los Pollack RN  Outcome: Progressing  10/13/2022 0953 by Mindy Starks RCP  Outcome: Progressing  Flowsheets (Taken 10/13/2022 0800 by Los Pollack RN)  Achieves optimal ventilation and oxygenation: Assess for changes in respiratory status

## 2022-10-13 NOTE — CONSULTS
[facility] Cardiology Cardiology    Inpatient Consultation Note               Today's Date: 10/13/2022  Patient Name: Franny Kilgore  Date of admission: 10/12/2022 12:08 PM  Patient's age: 58 y.o., 1960  Admission Dx: Chest pain [R07.9]  Chest pain, unspecified type [R07.9]    Reason for  Consult:  Chest pain    Requesting Physician: Radha Woodruff MD    CHIEF COMPLAINT:     Chief Complaint   Patient presents with    Chest Pain    Back Pain       History Obtained From:  patient, electronic medical record    HISTORY OF PRESENT ILLNESS:      The patient is a 58 y.o. male who is admitted to the hospital for back pain. Patient has a significant past medical history of ascending aortic aneurysm s/p repair in 2012, DLD, HTN, COPD, Former smoker presented to the ED after sudden onset back pain while moving heavy objects. Patient reports while he was in ED he had an episode of sudden onset chest pain that he describes as a pressure like sensation. He states that he gets extensively short of breath while climbing up a flight of stairs or walking a city block. States he occasionally has sharp chest pain that lasts a few moments but this was different. In ED, EKG shows sinus bradycardia with LVH criteria. Hs Troponin -ve x3. CT chest negative for any acute events. Patient admitted for further work up, Cardiology consulted for chest pain. Currently reports no complaints, laying comfortably in bed. BP noted to be elevated throughout hospitalization.     Past Medical History:   has a past medical history of Abdominal pain, Ascending aortic aneurysm, Chronic pain, COPD (chronic obstructive pulmonary disease) ([address] Dyslipidemia, GERD (gastroesophageal reflux disease), Hepatitis A, History of rib fractures multiple, Hypertension, Other accident, Pneumonia, Rectal bleeding, Rectal pain, S/P thoracic aortic aneurysm repair, Severe single current episode of major depressive disorder, without psychotic features ([address] and Spinal fracture. 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 Home Medications:    Prior to Admission medications    Medication Sig Start Date End Date Taking? Authorizing Provider   losartan (COZAAR) 25 MG tablet  9/17/22   Historical Provider, MD   amLODIPine (NORVASC) 5 MG tablet  9/16/22   Historical Provider, MD   oxyCODONE-acetaminophen (PERCOCET) 5-325 MG per tablet Take 1 tablet by mouth every 8 hours as needed for Pain for up to 30 days.  9/24/22 10/24/22  TAYLOR Marte CNP   amitriptyline (ELAVIL) 25 MG tablet Take 1 tablet by mouth nightly  Patient not taking: Reported on 10/12/2022 9/23/22   Rachel Brown MD   gabapentin (NEURONTIN) 800 MG tablet TAKE ONE TABLET BY MOUTH FOUR TIMES A DAY 6/8/22 7/8/22  TAYLOR Marte CNP   vitamin D (ERGOCALCIFEROL) 1.25 MG (54855 UT) CAPS capsule Take 1 capsule by mouth once a week 5/6/22   TAYLOR Marte CNP   pyridoxine (RA VITAMIN B-6) 50 MG tablet Take 1 tablet by mouth daily 4/27/22 4/27/23  TAYLOR Marte CNP   alfuzosin (UROXATRAL) 10 MG extended release tablet Take 1 tablet by mouth daily 3/22/22   Megan Sargent MD   albuterol sulfate HFA (PROVENTIL HFA) 108 (90 Base) MCG/ACT inhaler Inhale 2 puffs into the lungs every 4 hours as needed for Wheezing or Shortness of Breath  Patient not taking: Reported on 10/12/2022 10/27/21   TAYLOR Marte CNP   tiotropium (SPIRIVA RESPIMAT) 2.5 MCG/ACT AERS inhaler Inhale 2 puffs into the lungs daily  Patient not taking: Reported on 10/12/2022 10/27/21   TAYLOR Marte - CNP   Skin Protectants, Misc. (EUCERIN) cream Apply topically twice daily. 9/28/21   Trey Cantrell MD   aspirin 81 MG chewable tablet Take 1 tablet by mouth daily  Patient not taking: Reported on 10/12/2022 12/2/19   TAYLOR Johnson CNP   albuterol (PROVENTIL) (2.5 MG/3ML) 0.083% nebulizer solution Take 3 mLs by nebulization every 6 hours as needed for Wheezing  Patient not taking: Reported on 10/12/2022 12/2/19   TAYLOR Johnson CNP   Blood Pressure KIT Diagnosis: HTN. Needs to check blood pressure 1-2 times a day until stable, then once a day.  Goal blood pressure less than 135/85, and above 110/60. 1/17/19   Santi Wasserman MD        Current Facility-Administered Medications: gabapentin (NEURONTIN) tablet 800 mg, 800 mg, Oral, Q6H  nitroGLYCERIN (NITROSTAT) SL tablet 0.4 mg, 0.4 mg, SubLINGual, Q5 Min PRN  albuterol (PROVENTIL) nebulizer solution 2.5 mg, 2.5 mg, Nebulization, Q6H PRN  albuterol sulfate HFA (PROVENTIL;VENTOLIN;PROAIR) 108 (90 Base) MCG/ACT inhaler 2 puff, 2 puff, Inhalation, Q4H PRN  amitriptyline (ELAVIL) tablet 25 mg, 25 mg, Oral, Nightly  aspirin chewable tablet 81 mg, 81 mg, Oral, Daily  losartan (COZAAR) tablet 50 mg, 50 mg, Oral, Daily  oxyCODONE-acetaminophen (PERCOCET) 5-325 MG per tablet 1 tablet, 1 tablet, Oral, Q8H PRN  tiotropium (SPIRIVA RESPIMAT) 2.5 MCG/ACT inhaler 2 puff, 2 puff, Inhalation, Daily  sodium chloride flush 0.9 % injection 5-40 mL, 5-40 mL, IntraVENous, 2 times per day  sodium chloride flush 0.9 % injection 5-40 mL, 5-40 mL, IntraVENous, PRN  0.9 % sodium chloride infusion, , IntraVENous, PRN  enoxaparin (LOVENOX) injection 40 mg, 40 mg, SubCUTAneous, Daily  ondansetron (ZOFRAN-ODT) disintegrating tablet 4 mg, 4 mg, Oral, Q8H PRN **OR** ondansetron (ZOFRAN) injection 4 mg, 4 mg, IntraVENous, Q6H PRN  polyethylene glycol (GLYCOLAX) packet 17 g, 17 g, Oral, Daily PRN  acetaminophen (TYLENOL) tablet 650 mg, 650 mg, Oral, Q6H PRN **OR** acetaminophen (TYLENOL) suppository 650 mg, 650 mg, Rectal, Q6H PRN    Allergies:  Bactrim [sulfamethoxazole-trimethoprim], Keflex [cephalexin], Aleve [naproxen], Lyrica [pregabalin], Tramadol, and Vicodin [hydrocodone-acetaminophen]    Social History:   reports that he quit smoking about 32 years ago. His smoking use included cigarettes. He started smoking about 50 years ago. He has a 22.00 pack-year smoking history. He has never used smokeless tobacco. He reports current drug use. Frequency: 3.00 times per week. Drug: Marijuana Shellye Burkitt). He reports that he does not drink alcohol. Family History: family history includes Cancer in his sister; Diabetes in his brother, mother, sister, sister, and sister; Heart Disease in his mother; Kidney Disease in his sister. REVIEW OF SYSTEMS:      Constitutional: there has been no unanticipated weight loss. Eyes: No visual changes or diplopia. ENT: No Headaches  Cardiovascular:  Remaining as above  Respiratory: No cough  Gastrointestinal: No abdominal pain. No change in bowel or bladder habits. Genitourinary: No dysuria, trouble voiding, or hematuria. Musculoskeletal: No joint complaints. Neurological: No headache  Hematologic/Lymphatic: No abnormal bruising or bleeding      PHYSICAL EXAM:      BP (!) 188/119   Pulse 60   Temp 98 °F (36.7 °C) (Oral)   Resp 18   Ht 5' 9\" (1.753 m)   Wt 137 lb (62.1 kg)   SpO2 97%   BMI 20.23 kg/m²      Intake/Output Summary (Last 24 hours) at 10/13/2022 0842  Last data filed at 10/12/2022 1729  Gross per 24 hour   Intake --   Output 900 ml   Net -900 ml         Constitutional and General Appearance:   Alert, cooperative, no distress and appears stated age  Respiratory:  No for increased work of breathing. On auscultation: clear to auscultation bilaterally  Cardiovascular:  Regular S1 and S2.    No murmurs  Abdomen:   No masses or tenderness  Bowel sounds present  Extremities:   No Cyanosis or Clubbing   Lower extremity edema: No  Neurological:  Not performed    DATA:    Diagnostics:    EKG:   SB with LVH  ECHO:  9/2/14  Summary  Aortic valve appears bicuspid, with calcification; A small, echo density is  seen on the aortic valve suggestive of a small vegetation; Would recommend  LUCIANO to further evaluate this valve. Evidence of mild aortic stenosis; No significant regurgitation is seen. Estimated ejection fraction is 55 % . Mild right atrial enlargement. Mild mitral and tricuspid regurgitation. LUCIANO 9/4/14  Normal left ventricle size and function with an estimated EF > 55%. No definite evidence of any intra-cardiac thrombi, shunts, or vegetations      Stress Test: 11/19/18  1. No definitive scintigraphic evidence to suggest reversible ischemia or   infarct. 2. Left ventricular ejection fraction of 60%. Hypokinesis of the   anteroseptal wall. 3.  Please see report for EKG portion of the examination which will be   performed separately by physician from cardiology. Risk stratification:  Intermediate risk       Labs:     CBC:   Recent Labs     10/12/22  1258 10/13/22  0657   WBC 8.8 6.5   HGB 13.6 12.9*   HCT 40.2* 38.3*   PLT See Reflexed IPF Result 173     BMP:   Recent Labs     10/12/22  1258 10/13/22  0657    137   K 4.4 4.3   CO2 25 25   BUN 11 14   CREATININE 0.92 0.94   LABGLOM >60 >60   GLUCOSE 87 73     Pro-BNP:  No results for input(s): PROBNP in the last 72 hours. BNP: No results for input(s): BNP in the last 72 hours. PT/INR: No results for input(s): PROTIME, INR in the last 72 hours. APTT:No results for input(s): APTT in the last 72 hours. CARDIAC ENZYMES:No results for input(s): CKTOTAL, CKMB, CKMBINDEX, TROPONINI in the last 72 hours. Invalid input(s):  TROPONINT  No results for input(s): TROPONINT in the last 72 hours.     FASTING LIPID PANEL:  Lab Results   Component Value Date/Time    HDL 34 05/26/2021 12:00 AM    LDLCALC 113 05/26/2021 12:00 AM    TRIG 61 04/04/2018 09:00 AM LIVER PROFILE:  Recent Labs     10/12/22  1258   AST 22   ALT 11   LABALBU 4.2         Patient's Active Problem List  Principal Problem:    Chest pain  Resolved Problems:    * No resolved hospital problems. *        IMPRESSION:    Non cardiac chest pain  Back pain  Ascending aortic aneurysm s/p repair in 2012  HTN  HLD  COPD  Former Smoker    RECOMMENDATIONS:  Increase losartan 50 mg daily to 100 mg daily  Continue amlodipine 5 mg daily. If continues to be hypertensive throughout the day increase to amlodipine 10 mg daily. Continue ASA  Pain control  K>4, Mg>2    Thank you for allowing us to participate in the care of Kimberly Guevara. If you have any questions or concerns, please do not hesitate to contact us. Discussed with patient and Nurse. Eren Ocampo MD  Fellow, [address] [place] [address]        Please note that part of this chart were generated using voice recognition  dictation software. Although every effort was made to ensure the accuracy of this automated transcription, some errors in transcription may have occurred. Attestation signed by      Attending Physician Statement:    I have discussed the care of  Kimberly Guevara , including pertinent history and exam findings, with the Cardiology fellow/resident. I have seen and examined the patient and the key elements of all parts of the encounter have been performed by me. I agree with the assessment, plan and orders as documented by the fellow/resident, after I modified exam findings and plan of treatments, and the final version is my approved version of the assessment. Additional Comments: Patient was helping put plywood in car and felt his back cracked and got in sudden severe back pain. He came in for evaluation. BP high. H/o aortic aneurysm repair with CT showing stable finding. ECG- no ischemic changes. HS trop negative. Back pain reproducible. Pain control. Losartan increased for BP control.  At home BP is table. No further cardiac work up indicated at this point in time.      Jacqueline South MD

## 2022-10-13 NOTE — CARE COORDINATION
10/13/22 1147   Service Assessment   Patient Orientation Alert and Oriented   History Provided By Patient   PCP Verified by CM Yes  Paige Gaucher)   Last Visit to PCP Within last 3 months   Current Functional Level Independent in ADLs/IADLs   Social/Functional History   Lives With [address] [place] [address]  (pulse ox, bp cuff)   Discharge Planning   Patient expects to be discharged to: [facility]   Confirm Follow Up Transport Family     Plan return home independently

## 2022-10-14 ENCOUNTER — CARE COORDINATION (OUTPATIENT)
Dept: CASE MANAGEMENT | Age: 62
End: 2022-10-14

## 2022-10-14 NOTE — ED PROVIDER NOTES
[facility]  eMERGENCY dEPARTMENT eNCOUnter   Attending Attestation     Pt Name: Steph Vo  MRN: [id number]  [place] 1960  Date of evaluation: 10/14/22       Steph Vo is a 58 y.o. male who presents with Chest Pain and Back Pain      History: Patient presents with chest pain and back pain. Initially patient just had back pain he was moving a piece of plywood and putting it in his truck when he used his head and when he used his head to push the board he felt crunching in his back. Patient has history of thoracic spine fractures. Exam: Heart rate and rhythm are regular. Lungs are clear to auscultation bilaterally. Abdomen is soft, nontender. Patient is tenderness over the spine. Plan for CTs, ACS work-up, likely admission. I performed a history and physical examination of the patient and discussed management with the resident. I reviewed the residents note and agree with the documented findings and plan of care. Any areas of disagreement are noted on the chart. I was personally present for the key portions of any procedures. I have documented in the chart those procedures where I was not present during the key portions. I have personally reviewed all images and agree with the resident's interpretation. I have reviewed the emergency nurses triage note. I agree with the chief complaint, past medical history, past surgical history, allergies, medications, social and family history as documented unless otherwise noted below. Documentation of the HPI, Physical Exam and Medical Decision Making performed by medical students or scribes is based on my personal performance of the HPI, PE and MDM. For Phys Assistant/ Nurse Practitioner cases/documentation I have had a face to face evaluation of this patient and have completed at least one if not all key elements of the E/M (history, physical exam, and MDM). Additional findings are as noted.     For APC cases I have personally evaluated and examined the patient in conjunction with the APC and agree with the treatment plan and disposition of the patient as recorded by the APC.     Salo Hernandez MD  Attending Emergency  Physician       Tip Gonsalez MD  10/14/22 4146

## 2022-10-14 NOTE — CARE COORDINATION
[facility] Care Transitions Initial Follow Up Call    Call within 2 business days of discharge: Yes    Care Transition Nurse contacted the patient by telephone to perform post hospital discharge assessment. Patient: Tylor Rush Patient : 1960   MRN: [id number]  Reason for Admission: cp  Discharge Date: 10/13/22 RARS: No data recorded    Last Discharge  Street       Date Complaint Diagnosis Description Type Department Provider    10/12/22 Chest Pain; Back Pain Chest pain, unspecified type . .. ED to Hosp-Admission (Discharged) (ADMITTED) MELBA Ledezma MD; Clark Robb. .. Was this an external facility discharge? No [place] [facility]    # 1 attempt-Attempted initial 24 hour hospital follow up call. Left a Hipaa compliant message with name and call back information. Requested return call to [phone]. Care Transitions 24 Hour Call    Care Transitions Interventions         Follow Up  Future Appointments   Date Time Provider Romain Marie   2022  9:00 AM TAYLOR Sawyer - CNP [place] [facility]   2022 10:50 AM MD Michael Luu  Transition Nurse provided contact information. Plan for follow-up call in 1-2 days based on severity of symptoms and risk factors.   Plan for next call:  initial 24 hr f/u calls    Elena Albrecht RN

## 2022-10-17 ENCOUNTER — CARE COORDINATION (OUTPATIENT)
Dept: CASE MANAGEMENT | Age: 62
End: 2022-10-17

## 2022-10-17 DIAGNOSIS — G89.4 CHRONIC PAIN SYNDROME: Primary | ICD-10-CM

## 2022-10-17 PROCEDURE — 1111F DSCHRG MED/CURRENT MED MERGE: CPT | Performed by: NURSE PRACTITIONER

## 2022-10-17 NOTE — CARE COORDINATION
[facility] Care Transitions Initial Follow Up Call    Call within 2 business days of discharge: Yes    Care Transition Nurse contacted the patient by telephone to perform post hospital discharge assessment. Verified name and  with patient as identifiers. Provided introduction to self, and explanation of the Care Transition Nurse role. Patient: Cain Amaya Patient : 1960   MRN: [id number]  Reason for Admission: Chest pain, Back pain  Discharge Date: 10/13/22 RARS: No data recorded    Last Discharge  Street       Date Complaint Diagnosis Description Type Department Provider    10/12/22 Chest Pain; Back Pain Chest pain, unspecified type . .. ED to Hosp-Admission (Discharged) (ADMITTED) MELBA Greco MD; Brittany Hector. .. Was this an external facility discharge? No Discharge Facility: [facility] to be reviewed by the provider   Additional needs identified to be addressed with provider: No  none               Method of communication with provider: none. Contacted patient for initial care transition follow up. Demetrio Mahmood states he is \"not the greatest but doing okay\". Continues to have back pain. He is taking the Oxycodone which he states \"helps some\". States he cut the grass with his riding mower yesterday and as he went over a few bumps he noticed chest pain for a few seconds. Denies having any chest pain/discomfort otherwise. Reports breathing is at baseline. States he has breathing tx which he only uses as needed. He reports having dizziness every now and then. Encouraged to avoid any quick or sudden movements as well as to sit/stand for a few seconds to minutes before moving. He verbalized understanding. He informed CTN that he is checking his BP but was unable to give any specific readings because he was not at home. He is eating and drinking fluids w/o issues. Denies having any urinary or bowel issues.   Reviewed medication list.  He confirmed he is taking the Oxycodone. Also reviewed changed medications. States he is out of Aspirin and will have to pick more up from the pharmacy. He has not scheduled a one week hospital follow up with PCP. Before CTN could address importance of following up sooner than 22 patient states he had to go because the truck he was in just . Briefly discussed when to contact provider with any new or worsening symptoms. He verbalized understanding. No questions or needs at this time. Care Transition Nurse reviewed discharge instructions and red flags with patient who verbalized understanding. The patient was given an opportunity to ask questions and does not have any further questions or concerns at this time. Were discharge instructions available to patient? Yes. Reviewed appropriate site of care based on symptoms and resources available to patient including: PCP and specialist. The patient agrees to contact the PCP office for questions related to their healthcare. Advance Care Planning:   Does patient have an Advance Directive: not on file. Medication reconciliation was performed with patient, who verbalizes understanding of administration of home medications.  Medications reviewed, 1111F entered: yes    Was patient discharged with a pulse oximeter? no    Non-face-to-face services provided:  Obtained and reviewed discharge summary and/or continuity of care documents  Education of patient/family/caregiver/guardian to support self-management-when to contact providers    Offered patient enrollment in the Remote Patient Monitoring (RPM) program for in-home monitoring: NA.    Care Transitions 24 Hour Call    Do you have a copy of your discharge instructions?: Yes  Do you have all of your prescriptions and are they filled?: No  Have you been contacted by a [address]?: No  Have you scheduled your follow up appointment?: No  Do you feel like you have everything you need to keep you well at home?: Yes  Care Transitions Interventions         Follow Up  Future Appointments   Date Time Provider Romain Marie   11/9/2022  9:00 AM TAYLOR Delgado - CNP [place] [facility]   11/29/2022 10:50 AM MD Michael Whitaker  Transition Nurse provided contact information. Plan for follow-up call in 3-5 days based on severity of symptoms and risk factors.   Plan for next call: symptom management-chest pain, back pain, any new or worsening symptoms  follow-up appointment-ensure patient scheduled a hospital follow up    Balta Mayorga RN   Care Transition Nurse

## 2022-10-20 ENCOUNTER — CARE COORDINATION (OUTPATIENT)
Dept: CASE MANAGEMENT | Age: 62
End: 2022-10-20

## 2022-10-20 NOTE — CARE COORDINATION
[facility] Care Transitions Follow Up Call    Care Transition Nurse contacted the patient by telephone to follow up after admission on 10/12/22. Verified name and  with patient as identifiers. Patient: Nancy Ramesh  Patient : 1960   MRN: [id number]  Reason for Admission: CP  Discharge Date: 10/13/22 RARS: No data recorded    Needs to be reviewed by the provider   Additional needs identified to be addressed with provider: No  none             Method of communication with provider: none. Yane Coronado reports back pain is, \"not as bad\". States that he takes Oxycodone on average 3 times daily. States that the medication is almost gone and that he will notify his PCP's office. The earliest appointment he could get at is 22. States that the chest pain is, \"back to normal\". Reports dizziness when he stands, rises slowly. Recent BP was 150/97. Addressed changes since last contact:   see note  Discussed follow-up appointments. If no appointment was previously scheduled, appointment scheduling offered: self scheduled. Is follow up appointment scheduled within 7 days of discharge? No.    Follow Up  Future Appointments   Date Time Provider Romain Marie   2022  9:00 AM TAYLOR Weston - CNP [place] [address]   2022 10:50 AM Francisco Javier Prescott MD [place] [address] Transition Nurse reviewed discharge instructions, medical action plan, and red flags with patient and discussed any barriers to care and/or understanding of plan of care after discharge. Discussed appropriate site of care based on symptoms and resources available to patient including: PCP. The patient agrees to contact the PCP office for questions related to their healthcare. Advance Care Planning:   not on file; education provided.      Patients top risk factors for readmission: medical condition-Chronic Pain Syndrome, HTN  Interventions to address risk factors:  see note    Offered patient enrollment in the

## 2022-10-27 ENCOUNTER — CARE COORDINATION (OUTPATIENT)
Dept: CASE MANAGEMENT | Age: 62
End: 2022-10-27

## 2022-10-27 NOTE — CARE COORDINATION
[facility] Care Transitions Follow Up Call    Care Transition Nurse contacted the patient by telephone to follow up after admission on 10/27/22. Verified name and  with patient as identifiers. Patient: Ben Aldana  Patient : 1960   MRN: [id number]  Reason for Admission:   Discharge Date: 10/13/22 RARS: No data recorded    Needs to be reviewed by the provider   Additional needs identified to be addressed with provider: No  none             Method of communication with provider: none. Writer spoke to laina, he was on his way to [address] [place] [address] [employer] to  his refill of percocet at time of call, stated his pain level was a 8 out of 10 other than that he is doing ok, has an appointment with his pcp on 22, no c/o f/c, n/v/d, sob, cp or dizziness, will follow//JU    Addressed changes since last contact:  none  Discussed follow-up appointments. If no appointment was previously scheduled, appointment scheduling offered: Yes. Is follow up appointment scheduled within 7 days of discharge? Yes. Follow Up  Future Appointments   Date Time Provider Romain Marie   2022  9:00 AM TAYLOR Rios - CNP [facility]   2022 10:50 AM Reanna Vila MD [address] [place] [address] follow up appointment(s):     Care Transition Nurse reviewed medical action plan and red flags with patient and discussed any barriers to care and/or understanding of plan of care after discharge. Discussed appropriate site of care based on symptoms and resources available to patient including: PCP  Specialist  Urgent care clinics  When to call 911. The patient agrees to contact the PCP office for questions related to their healthcare. Advance Care Planning:     . Patients top risk factors for readmission: functional physical ability  Interventions to address risk factors:       Offered patient enrollment in the Remote Patient Monitoring (RPM) program for in-home monitoring:    .      Care Transitions Subsequent and Final Call    Schedule Follow Up Appointment with PCP: Completed  Subsequent and Final Calls  Do you have any ongoing symptoms?: Yes  Onset of Patient-reported symptoms: In the past 7 days  Patient-reported symptoms: Pain  Do you have any questions related to your medications?: No  Do you have any needs or concerns that I can assist you with?: No  Identified Barriers: Fear of Failure  Care Transitions Interventions  Other Interventions:             Care Transition Nurse provided contact information for future needs. Plan for follow-up call in 5-7 days based on severity of symptoms and risk factors.   Plan for next call: symptom management-   self management-   follow-up appointment-     Galileo Sal RN

## 2022-11-04 ENCOUNTER — CARE COORDINATION (OUTPATIENT)
Dept: CASE MANAGEMENT | Age: 62
End: 2022-11-04

## 2022-11-04 NOTE — CARE COORDINATION
[facility] Care Transitions Follow Up Call    Care Transition Nurse contacted the patient by telephone to follow up after admission on 22. Verified name and  with patient as identifiers. Patient: Deepa Florentino  Patient : 1960   MRN: [id number]  Reason for Admission:   Discharge Date: 10/13/22 RARS: No data recorded    Needs to be reviewed by the provider   Additional needs identified to be addressed with provider: No  none             Method of communication with provider: none. Writer received call back from patient, he is doing well, no new needs or concerns at this time, reviewed up coming appointments, will continue to follow//JU    Addressed changes since last contact:  none  Discussed follow-up appointments. If no appointment was previously scheduled, appointment scheduling offered: Yes. Is follow up appointment scheduled within 7 days of discharge? Yes. Follow Up  Future Appointments   Date Time Provider Romain Marie   2022  9:00 AM [facility]   2022 10:50 AM Ernestine Lopez MD [facility] Transitions Subsequent and Final Call    Subsequent and Final Calls  Do you have any ongoing symptoms?: No  Do you have any needs or concerns that I can assist you with?: No  Care Transitions Interventions  Other Interventions:             Care Transition Nurse provided contact information for future needs. Plan for follow-up call in 7-10 days based on severity of symptoms and risk factors.   Plan for next call: follow-up appointment-     Janneth Morales RN

## 2022-11-04 NOTE — CARE COORDINATION
Care Transitions Outreach Attempt    Call within 2 business days of discharge: Yes   Attempted to reach patient for transitions of care follow up. Unable to reach patient. Patient: Tete Mccartney Patient : 1960 MRN: [id number]    Last Discharge  Street       Date Complaint Diagnosis Description Type Department Provider    10/12/22 Chest Pain; Back Pain Chest pain, unspecified type . .. ED to Hosp-Admission (Discharged) (ADMITTED) MELBA Melton MD; Eli Dean. .. # 1 attempt-LM  Attempted to reach patient for subsequent call. Left Hip[employer] appropriate message with contact information requesting return call to [phone]     Was this an external facility discharge?  No Discharge Facility: [facility]    Noted following upcoming appointments from discharge chart review:   [facility] follow up appointment(s):   Future Appointments   Date Time Provider Romain Marie   2022  9:00 AM J Carlos Garland, APRN - [facility]   2022 10:50 AM Vanessa Canseco MD [address] [place] [address] follow up appointment(s):  22

## 2022-11-11 ENCOUNTER — CARE COORDINATION (OUTPATIENT)
Dept: CASE MANAGEMENT | Age: 62
End: 2022-11-11

## 2022-11-18 ENCOUNTER — CARE COORDINATION (OUTPATIENT)
Dept: CASE MANAGEMENT | Age: 62
End: 2022-11-18

## 2022-11-18 NOTE — CARE COORDINATION
Care Transitions Outreach Attempt    Call within 2 business days of discharge: Yes   Attempted to reach patient for transitions of care follow up. Unable to reach patient. Patient: Nakia Jones Patient : 1960 MRN: [id number]    Last Discharge  Street       Date Complaint Diagnosis Description Type Department Provider    10/12/22 Chest Pain; Back Pain Chest pain, unspecified type . .. ED to Hosp-Admission (Discharged) (ADMITTED) MELBA Ragland MD; Dane Jeffers. .. # 2 attempt-Attempted to reach patient for subsequent call. Left Hipaa appropriate message with contact information requesting return call to [phone]   Final call-care episode resolved  Was this an external facility discharge?  No Discharge Facility: [facility]    Noted following upcoming appointments from discharge chart review:   [facility] follow up appointment(s):   Future Appointments   Date Time Provider Romain Marie   2022  7:00 PM [facility] MRI  [facility] MRI [facility] Radiolog   2022 10:50 AM Liz Vigil MD [facility]   2022  2:00 PM DO Janel Cormier Neuro [facility]   2023  9:00 AM TAYLOR Goodrich - CNP [facility]  [facility]     [facility] follow up appointment(s):

## 2022-11-22 ENCOUNTER — HOSPITAL ENCOUNTER (OUTPATIENT)
Dept: MRI IMAGING | Age: 62
Discharge: HOME | End: 2022-11-24
Payer: MEDICARE

## 2022-11-22 DIAGNOSIS — M25.511 CHRONIC RIGHT SHOULDER PAIN: ICD-10-CM

## 2022-11-22 DIAGNOSIS — G89.29 CHRONIC RIGHT SHOULDER PAIN: ICD-10-CM

## 2022-11-22 PROCEDURE — 73221 MRI JOINT UPR EXTREM W/O DYE: CPT

## 2022-12-05 ENCOUNTER — OFFICE VISIT (OUTPATIENT)
Dept: ORTHOPEDIC SURGERY | Age: 62
End: 2022-12-05
Payer: MEDICARE

## 2022-12-05 VITALS — HEIGHT: 69 IN | WEIGHT: 138 LBS | BODY MASS INDEX: 20.44 KG/M2 | RESPIRATION RATE: 16 BRPM

## 2022-12-05 DIAGNOSIS — M75.101 TEAR OF RIGHT ROTATOR CUFF, UNSPECIFIED TEAR EXTENT, UNSPECIFIED WHETHER TRAUMATIC: Primary | ICD-10-CM

## 2022-12-05 DIAGNOSIS — R93.6 ABNORMAL FINDINGS ON DIAGNOSTIC IMAGING OF LIMBS: ICD-10-CM

## 2022-12-05 PROCEDURE — 99204 OFFICE O/P NEW MOD 45 MIN: CPT | Performed by: ORTHOPAEDIC SURGERY

## 2022-12-05 NOTE — LETTER
12/5/2022    Rosanne Stahl, APRN - CNP  [facility]  [address] [place] [address],[address]  [place],  [address] [place] [address]    RE: Yadi Stahl    Dear Dr. Ramona Pruitt,    Thank you for allowing me to participate in the care of Mr. Andres Ferrara. I had the opportunity to evaluate the patient on 12/5/2022. Attached you will find my evaluation and recommendations. Thanks again for the confidence you have expressed in me by allowing my participation in the care of your patient. I will keep you apprised of further developments in the patients treatment course as it progresses. If I can be of further assistance in any fashion, please feel free to contact me at your convenience.     Sincerely,         Susan Turk  Shoulder and Elbow Surgery

## 2022-12-05 NOTE — PROGRESS NOTES
Orthopedic Shoulder Encounter Note     Chief complaint: right shoulder pain    HPI: Franny Kilgore is a 58 y.o.  right-hand dominant male who presents for evaluation of his right shoulder. He indicates that about 5 months ago on 7/3/2022 he was trying to lift a large TV when he felt a ripping sensation and pain in the right shoulder. He has been having pain ever since. He was in physical therapy at that time for his back and so had some therapy on the shoulder as well but has had persistent pain which he describes as being constant primarily localized to superior aspect of the shoulder. It hurts to raise his arm and he describes having associated weakness being able to barely  a cup of coffee. He does report a history of 2 shoulder dislocations back in the 90s. Previous treatment:    NSAIDs: No    Physical Therapy: Yes    Injections: None    Surgeries: None    Review of Systems:   Constitutional: Negative for fever, chills, sweats. Pain score: 10/10  Neurological: Negative for headache, numbness, or weakness. Musculoskeletal: As noted in HPI     Past Medical History  Suha Nickerson  has a past medical history of Abdominal pain, Ascending aortic aneurysm, Chronic pain, COPD (chronic obstructive pulmonary disease) ([facility] [address] Dyslipidemia, GERD (gastroesophageal reflux disease), Hepatitis A, History of rib fractures multiple, Hypertension, Other accident, Pneumonia, Rectal bleeding, Rectal pain, S/P thoracic aortic aneurysm repair, Severe single current episode of major depressive disorder, without psychotic features ([address] and Spinal fracture. Past Surgical History  Suha Nickerson  has a past surgical history that includes Tonsillectomy and adenoidectomy; Anterior cruciate ligament repair (Left); other surgical history; Skin graft; Coronary aneurysm repair (9/24/12); Circumcision; Knee arthroscopy (Right, 2012); Knee arthroscopy (Left, 2003); Cardiac catheterization (9/19/12);  Total knee arthroplasty (Left, 3/3/2015); Thoracic aortic aneurysm repair (2012); and joint replacement. Current Medications  Current Outpatient Medications   Medication Sig Dispense Refill    oxyCODONE-acetaminophen (PERCOCET) 5-325 MG per tablet Take 1 tablet by mouth every 8 hours as needed for Pain for up to 30 days. 90 tablet 0    Skin Protectants, Misc. (EUCERIN) cream Apply topically twice daily. 113 g 1    pyridoxine (RA VITAMIN B-6) 50 MG tablet Take 1 tablet by mouth daily 30 tablet 5    Cholecalciferol (VITAMIN D3) 125 MCG (5000 UT) TABS Take 1 tablet by mouth daily 30 tablet 5    triamcinolone (KENALOG) 0.025 % cream Apply topically 2 times daily. 30 g 0    gabapentin (NEURONTIN) 800 MG tablet TAKE ONE TABLET BY MOUTH FOUR TIMES A  tablet 3    amLODIPine (NORVASC) 5 MG tablet       amitriptyline (ELAVIL) 25 MG tablet Take 1 tablet by mouth nightly 30 tablet 1    alfuzosin (UROXATRAL) 10 MG extended release tablet Take 1 tablet by mouth daily 30 tablet 6    albuterol sulfate HFA (PROVENTIL HFA) 108 (90 Base) MCG/ACT inhaler Inhale 2 puffs into the lungs every 4 hours as needed for Wheezing or Shortness of Breath 18 g 3    tiotropium (SPIRIVA RESPIMAT) 2.5 MCG/ACT AERS inhaler Inhale 2 puffs into the lungs daily      aspirin 81 MG chewable tablet Take 1 tablet by mouth daily 30 tablet 5    albuterol (PROVENTIL) (2.5 MG/3ML) 0.083% nebulizer solution Take 3 mLs by nebulization every 6 hours as needed for Wheezing 120 each 3    Blood Pressure KIT Diagnosis: HTN. Needs to check blood pressure 1-2 times a day until stable, then once a day. Goal blood pressure less than 135/85, and above 110/60. 1 kit 0     Current Facility-Administered Medications   Medication Dose Route Frequency Provider Last Rate Last Admin    Hydrocerin cream CREA   Topical BID Martha Maynard MD   Given at 09/28/21 1419       Allergies  Allergies have been reviewed.   Keyla Chapman is allergic to bactrim [sulfamethoxazole-trimethoprim], keflex [cephalexin], aleve [naproxen], lyrica [pregabalin], tramadol, and vicodin [hydrocodone-acetaminophen]. Social History  Thomas Bautista  reports that he quit smoking about 32 years ago. His smoking use included cigarettes. He started smoking about 50 years ago. He has a 22.00 pack-year smoking history. He has never used smokeless tobacco. He reports current drug use. Frequency: 3.00 times per week. Drug: Marijuana Clark Hotter). He reports that he does not drink alcohol. Family History  Ha's family history includes Cancer in his sister; Diabetes in his brother, mother, sister, sister, and sister; Heart Disease in his mother; Kidney Disease in his sister. Physical Exam:     There were no vitals taken for this visit. Constitutional: Patient is oriented to person, place, and time. Patient appears well-developed and well nourished. Mental Status/psychiatric: Behavior is normal. Thought content normal.  HENT: Negative otherwise noted  Head: Normocephalic and Atraumatic  Nose: Normal  Respiratory/Cardio: Effort normal. No respiratory distress. Shoulder:    Skin: Skin is warm and dry; no swelling or obvious muscular atrophy.    Vasculature: 2+ radial pulses bilaterally  Neuro: Sensation grossly intact to light touch diffusely  Tenderness: Tender to palpation over the anterior and anterolateral corner of the right shoulder    ROM: (Degrees)    Right   A P   Left   A P    Elevation  135 145   Elevation  140   Abduction  95 145   Abduction  125    ER   35 45   ER   80   IR   L3    IR   T12   90 abd/ER      90 abd/ER     90 abd/IR      90 abd/IR     Crepitation  No    Crepitation No  Dyskenesia  No    Dyskenesia No      Muscle strength:    Right       Left    Deltoid   5    Deltoid   5  Supraspinatus  4    Supraspinatus  5  ER   3    ER   5  IR   5    IR   5    Special tests    Right   Rotator Cuff    Left    y   Painful arc    n   y   Pain with ER    n    y   Neer's     n    y   Hawkin's    n    n   Drop Arm    n  n   Lift off/Belly Press   n  n   ER Lag    n          AC Joint  n   AC tenderness   n  n   Cross-chest adduction  n       Labrum/biceps    y   Levasy's    n   n   Biceps sheer    n      y   Speed's/Yergason's   n    y   Tenderness Biceps Groove  n    n   Kg's    n         Instability  n   Ant Apprehension   n    n   Post Apprehension   n    n   Ant Load shift    n    n   Post Load shift   n   n   Sulcus     n  n   Generalized Laxity   n  n   Relocation test   n  n   Crank test     n  n   Danilo-superior escape  n       Imaging:  3 x-ray views of the right shoulder completed on 7/2/2022 were reviewed independently demonstrating no obvious fracture, dislocation or subluxation. MRI: MRI of the right shoulder completed on 11/22/22 was reviewed independently demonstrating full-thickness tear of the supraspinatus and infraspinatus tendons with retraction to the level of the glenoid. Subscapularis and biceps appear to be intact. Impression/Plan:     Alessio Vizcarra is a 58 y.o. old male with right shoulder full-thickness rotator cuff tear as outlined above. I had a discussion with the patient educating him about this condition and its natural history. We discussed treatment options available to him including nonoperative and operative intervention. I did recommend attempting another round of physical therapy and a short course of a prescription anti-inflammatory or cortisone injection. He is not of the belief that therapy will be effective for him at this time and so would like to proceed with surgery. We discussed surgical options by way of an arthroscopic rotator cuff repair. There is a chance that this is not a repairable rotator cuff in which case a superior capsule reconstruction would be an option for him. We discussed the details of the procedures, risks and benefits of surgery, expected outcome and postoperative recovery course.   Risks as discussed included but were not limited to risk of infection, wound healing problems, stiffness, progressive arthritis, persistent pain, re-tear of the rotator cuff, failure of the rotator cuff repair to heal, failure of the superior capsular reconstruction allograft to heal, suture and/or anchor related problems, vascular injury, excessive bleeding, temporary and/or permanent nerve injury, medical risks including DVT, PE, and stroke, and risk of anesthesia. He demonstrated a good understanding of our discussion and at this time would like to proceed with surgical intervention as outlined above. We will facilitate him getting cleared for surgery preoperatively and we'll schedule him for surgery at his convenience. All questions were appropriately answered however he was encouraged to return or call prior to surgery or any other upcoming appointments with additional questions and/or concerns. This note is created with the assistance of a speech recognition program.  While intending to generate adocument that actually reflects the content of the visit, the document can still have some errors including those of syntax and sound a like substitutions which may escape proof reading. It such instances, actual meaningcan be extrapolated by contextual diversion.     NA = Not assessed  RTC = Rotator cuff  RCT = Rotator cuff tear  ER = External rotation  IR = Internal rotation  AC = Acromioclavicular  GH = Glenohumeral  n = No  y = Yes

## 2022-12-21 ENCOUNTER — OFFICE VISIT (OUTPATIENT)
Dept: NEUROSURGERY | Age: 62
End: 2022-12-21
Payer: MEDICARE

## 2022-12-21 ENCOUNTER — TELEPHONE (OUTPATIENT)
Dept: INTERVENTIONAL RADIOLOGY/VASCULAR | Age: 62
End: 2022-12-21

## 2022-12-21 VITALS
WEIGHT: 138 LBS | TEMPERATURE: 97.3 F | DIASTOLIC BLOOD PRESSURE: 85 MMHG | SYSTOLIC BLOOD PRESSURE: 123 MMHG | HEART RATE: 66 BPM | BODY MASS INDEX: 20.44 KG/M2 | OXYGEN SATURATION: 98 % | HEIGHT: 69 IN

## 2022-12-21 DIAGNOSIS — G95.9 CERVICAL MYELOPATHY (HCC): ICD-10-CM

## 2022-12-21 DIAGNOSIS — M47.14 THORACIC MYELOPATHY: Primary | ICD-10-CM

## 2022-12-21 PROCEDURE — 3074F SYST BP LT 130 MM HG: CPT | Performed by: NEUROLOGICAL SURGERY

## 2022-12-21 PROCEDURE — 99205 OFFICE O/P NEW HI 60 MIN: CPT | Performed by: NEUROLOGICAL SURGERY

## 2022-12-21 PROCEDURE — 3078F DIAST BP <80 MM HG: CPT | Performed by: NEUROLOGICAL SURGERY
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
## 2022-12-29 ENCOUNTER — HOSPITAL ENCOUNTER (OUTPATIENT)
Dept: PREADMISSION TESTING | Age: 62
Discharge: HOME | End: 2022-12-29
Payer: MEDICARE

## 2022-12-29 VITALS
WEIGHT: 138 LBS | OXYGEN SATURATION: 100 % | SYSTOLIC BLOOD PRESSURE: 136 MMHG | RESPIRATION RATE: 20 BRPM | BODY MASS INDEX: 19.76 KG/M2 | TEMPERATURE: 97.9 F | HEIGHT: 70 IN | DIASTOLIC BLOOD PRESSURE: 92 MMHG | HEART RATE: 83 BPM

## 2022-12-29 DIAGNOSIS — M75.101 TEAR OF RIGHT ROTATOR CUFF, UNSPECIFIED TEAR EXTENT, UNSPECIFIED WHETHER TRAUMATIC: ICD-10-CM

## 2022-12-29 DIAGNOSIS — R93.6 ABNORMAL FINDINGS ON DIAGNOSTIC IMAGING OF LIMBS: ICD-10-CM

## 2022-12-29 DIAGNOSIS — Z01.818 PREOP EXAMINATION: ICD-10-CM

## 2022-12-29 PROBLEM — R20.9 DISTURBANCE OF SKIN SENSATION: Status: ACTIVE | Noted: 2020-01-16

## 2022-12-29 PROBLEM — M25.561 BILATERAL KNEE PAIN: Status: ACTIVE | Noted: 2019-04-11

## 2022-12-29 PROBLEM — M47.814 THORACIC SPONDYLOSIS WITHOUT MYELOPATHY: Status: ACTIVE | Noted: 2020-01-16

## 2022-12-29 PROBLEM — R52 PAIN OF MULTIPLE SITES: Status: ACTIVE | Noted: 2020-01-16

## 2022-12-29 PROBLEM — M25.562 BILATERAL KNEE PAIN: Status: ACTIVE | Noted: 2019-04-11

## 2022-12-29 PROBLEM — R53.83 FATIGUE: Status: ACTIVE | Noted: 2020-01-16

## 2022-12-29 PROBLEM — R26.9 ABNORMALITY OF GAIT AND MOBILITY: Status: ACTIVE | Noted: 2020-01-16

## 2022-12-29 LAB
ANION GAP SERPL CALCULATED.3IONS-SCNC: 8 MMOL/L (ref 9–17)
BUN BLDV-MCNC: 12 MG/DL (ref 8–23)
CALCIUM SERPL-MCNC: 9.3 MG/DL (ref 8.6–10.4)
CHLORIDE BLD-SCNC: 100 MMOL/L (ref 98–107)
CO2: 31 MMOL/L (ref 20–31)
CREAT SERPL-MCNC: 0.88 MG/DL (ref 0.7–1.2)
GFR SERPL CREATININE-BSD FRML MDRD: >60 ML/MIN/1.73M2
GLUCOSE BLD-MCNC: 97 MG/DL (ref 70–99)
HCT VFR BLD CALC: 43.7 % (ref 41–53)
HEMOGLOBIN: 14.6 G/DL (ref 13.5–17.5)
MCH RBC QN AUTO: 30.6 PG (ref 26–34)
MCHC RBC AUTO-ENTMCNC: 33.4 G/DL (ref 31–37)
MCV RBC AUTO: 91.8 FL (ref 80–100)
PDW BLD-RTO: 14.4 % (ref 11.5–14.9)
PLATELET # BLD: 216 K/UL (ref 150–450)
PMV BLD AUTO: 7.7 FL (ref 6–12)
POTASSIUM SERPL-SCNC: 5 MMOL/L (ref 3.7–5.3)
RBC # BLD: 4.77 M/UL (ref 4.5–5.9)
SODIUM BLD-SCNC: 139 MMOL/L (ref 135–144)
WBC # BLD: 9.3 K/UL (ref 3.5–11)

## 2022-12-29 PROCEDURE — 85027 COMPLETE CBC AUTOMATED: CPT

## 2022-12-29 PROCEDURE — APPSS45 APP SPLIT SHARED TIME 31-45 MINUTES: Performed by: NURSE PRACTITIONER

## 2022-12-29 PROCEDURE — 36415 COLL VENOUS BLD VENIPUNCTURE: CPT

## 2022-12-29 PROCEDURE — 80048 BASIC METABOLIC PNL TOTAL CA: CPT

## 2022-12-29 NOTE — DISCHARGE INSTRUCTIONS
Pre-op Instructions For Out-Patient Surgery    Medication Instructions:  Please stop herbs and any supplements now (includes vitamins and minerals). Please contact your surgeon and prescribing physician for pre-op instructions for any blood thinners. Aspirin     If you have inhalers/aerosol treatments at home, please use them the morning of your surgery and bring the inhalers with you to the hospital.    Please take the following medications the morning of your surgery with a sip of water:    Inhaler and Amlodipine     Surgery Instructions:  After midnight before surgery:  Do not eat or drink anything, including water, mints, gum, and hard candy. You may brush your teeth without swallowing. No smoking, chewing tobacco, or street drugs. Please shower or bathe before surgery. If you were given Surgical Scrub Chlorhexidine Gluconate Liquid (CHG), please shower the night before and the morning of your surgery following the detailed instructions you received during your pre-admission visit. Please do not wear any cologne, lotion, powder, deodorant, jewelry, piercings, perfume, makeup, nail polish, hair accessories, or hair spray on the day of surgery. Wear loose comfortable clothing. Leave your valuables at home. Bring a storage case for any glasses/contacts. An adult who is responsible for you MUST drive you home and should be with you for the first 24 hours after surgery. If having out-patient knee and foot surgeries, please arrange for planned crutches, walker, or wheelchair before arriving to the hospital.    The Day of Surgery:  Arrive at [facility] AT [facility] Surgery Entrance at the time directed by your surgeon and check in at the desk. If you have a living will or healthcare power of , please bring a copy. You will be taken to the pre-op holding area where you will be prepared for surgery.   A physical assessment will be performed by a nurse practitioner or house officer. Your IV will be started and you will meet your anesthesiologist.    When you go to surgery, your family will be directed to the surgical waiting room, where the doctor should speak with them after your surgery. After surgery, you will be taken to the recovery room then when you are awake and stable you will go to the short stay unit for preparation to be discharged. If you use a Bi-PAP or C-PAP machine, please bring it with you and leave it in the car in case it is needed in recovery room.

## 2023-01-04 RX ORDER — SODIUM CHLORIDE 9 MG/ML
INJECTION, SOLUTION INTRAVENOUS CONTINUOUS
OUTPATIENT
Start: 2023-01-04

## 2023-01-05 ENCOUNTER — TELEPHONE (OUTPATIENT)
Dept: ORTHOPEDIC SURGERY | Age: 63
End: 2023-01-05

## 2023-01-05 ENCOUNTER — HOSPITAL ENCOUNTER (OUTPATIENT)
Dept: CT IMAGING | Age: 63
Discharge: HOME | End: 2023-01-07

## 2023-01-05 ENCOUNTER — HOSPITAL ENCOUNTER (OUTPATIENT)
Dept: INTERVENTIONAL RADIOLOGY/VASCULAR | Age: 63
Discharge: HOME | End: 2023-01-07

## 2023-01-05 DIAGNOSIS — M47.14 THORACIC MYELOPATHY: ICD-10-CM

## 2023-01-05 RX ORDER — GABAPENTIN 300 MG/1
300 CAPSULE ORAL DAILY
Qty: 7 CAPSULE | Refills: 0 | Status: CANCELLED | OUTPATIENT
Start: 2023-01-05 | End: 2023-01-12

## 2023-01-05 RX ORDER — HYDROCODONE BITARTRATE AND ACETAMINOPHEN 5; 325 MG/1; MG/1
1 TABLET ORAL EVERY 4 HOURS PRN
Qty: 42 TABLET | Refills: 0 | Status: CANCELLED | OUTPATIENT
Start: 2023-01-05 | End: 2023-01-12

## 2023-01-05 RX ORDER — ONDANSETRON 4 MG/1
4 TABLET, FILM COATED ORAL DAILY PRN
Qty: 20 TABLET | Refills: 0 | Status: CANCELLED | OUTPATIENT
Start: 2023-01-05

## 2023-01-05 NOTE — TELEPHONE ENCOUNTER
Spoke with patient and notified him that I am still missing PCP clearance for his upcoming sx with Dr. Prema Romero. Patient is going to call PCP office to obtain clearance. Patient was provided with [facility] fax number.

## 2023-01-06 ENCOUNTER — OFFICE VISIT (OUTPATIENT)
Dept: ORTHOPEDIC SURGERY | Age: 63
End: 2023-01-06

## 2023-01-06 VITALS — RESPIRATION RATE: 16 BRPM | BODY MASS INDEX: 20.44 KG/M2 | HEIGHT: 69 IN | WEIGHT: 138 LBS

## 2023-01-06 DIAGNOSIS — M75.101 TEAR OF RIGHT ROTATOR CUFF, UNSPECIFIED TEAR EXTENT, UNSPECIFIED WHETHER TRAUMATIC: Primary | ICD-10-CM

## 2023-01-06 DIAGNOSIS — R93.6 ABNORMAL FINDINGS ON DIAGNOSTIC IMAGING OF LIMBS: ICD-10-CM

## 2023-01-08 RX ORDER — SODIUM CHLORIDE 0.9 % (FLUSH) 0.9 %
5-40 SYRINGE (ML) INJECTION EVERY 12 HOURS SCHEDULED
OUTPATIENT
Start: 2023-01-08

## 2023-01-08 RX ORDER — SODIUM CHLORIDE 0.9 % (FLUSH) 0.9 %
5-40 SYRINGE (ML) INJECTION PRN
OUTPATIENT
Start: 2023-01-08

## 2023-01-08 RX ORDER — ACETAMINOPHEN 325 MG/1
1000 TABLET ORAL ONCE
OUTPATIENT
Start: 2023-01-08 | End: 2023-01-08

## 2023-01-08 RX ORDER — SODIUM CHLORIDE 9 MG/ML
INJECTION, SOLUTION INTRAVENOUS PRN
OUTPATIENT
Start: 2023-01-08

## 2023-01-08 NOTE — PROGRESS NOTES
HPI: Mr. Netta Lincoln is a 55-year-old with right shoulder pain and weakness due to a massive rotator cuff tear. He has failed attempts at management conservatively with physical therapy and at this time is electing proceed with surgery by way of rotator cuff repair possible superior capsular reconstruction and biceps tenodesis. We discussed once more the details of the procedure, postoperative recovery course and expected outcome. He was provided a sling to protect the shoulder leading up to and following surgery. He already has prescriptions for analgesia and so none were provided today. Preoperative medical clearance is pending at this time. We will proceed with surgery as currently scheduled pending appropriate preoperative clearance. All questions were answered.

## 2023-01-10 ENCOUNTER — OFFICE VISIT (OUTPATIENT)
Dept: UROLOGY | Age: 63
End: 2023-01-10
Payer: MEDICARE

## 2023-01-10 VITALS
HEIGHT: 69 IN | BODY MASS INDEX: 19.99 KG/M2 | RESPIRATION RATE: 17 BRPM | HEART RATE: 89 BPM | SYSTOLIC BLOOD PRESSURE: 128 MMHG | WEIGHT: 135 LBS | DIASTOLIC BLOOD PRESSURE: 79 MMHG

## 2023-01-10 DIAGNOSIS — R39.12 WEAK URINARY STREAM: Primary | ICD-10-CM

## 2023-01-10 DIAGNOSIS — N52.9 ERECTILE DYSFUNCTION, UNSPECIFIED ERECTILE DYSFUNCTION TYPE: ICD-10-CM

## 2023-01-10 PROCEDURE — 99214 OFFICE O/P EST MOD 30 MIN: CPT | Performed by: UROLOGY

## 2023-01-10 PROCEDURE — 3078F DIAST BP <80 MM HG: CPT | Performed by: UROLOGY

## 2023-01-10 PROCEDURE — 3074F SYST BP LT 130 MM HG: CPT | Performed by: UROLOGY

## 2023-01-10 RX ORDER — ALFUZOSIN HYDROCHLORIDE 10 MG/1
10 TABLET, EXTENDED RELEASE ORAL 2 TIMES DAILY
Qty: 60 TABLET | Refills: 3 | Status: ON HOLD | OUTPATIENT
Start: 2023-01-10 | End: 2023-01-12

## 2023-01-10 ASSESSMENT — ENCOUNTER SYMPTOMS
DIARRHEA: 0
VOMITING: 0
BACK PAIN: 0
COUGH: 0
NAUSEA: 0
WHEEZING: 0
SHORTNESS OF BREATH: 0
EYE PAIN: 0
ABDOMINAL PAIN: 0
CONSTIPATION: 0

## 2023-01-10 NOTE — LETTER
[address] [address]  Dept: [phone]  Dept Fax: [phone]        1/10/23    Patient: Stephane Melgar  YOB: 1960    Dear Nadia Bennett, APRN - CNP,    I had the pleasure of seeing one of your patients, Rachel Foster today in the office today. Below are the relevant portions of my assessment and plan of care. IMPRESSION:  1. Weak urinary stream    2. Erectile dysfunction, unspecified erectile dysfunction type        PLAN:  He was doing well with uroxatral daily. The results have faded. Will increase to BID. Return in about 3 months (around 4/10/2023). Prescriptions Ordered:  Orders Placed This Encounter   Medications    alfuzosin (UROXATRAL) 10 MG extended release tablet     Sig: Take 1 tablet by mouth 2 times daily     Dispense:  60 tablet     Refill:  3       Orders Placed:  No orders of the defined types were placed in this encounter. Thank you for allowing me to participate in the care of this patient. I will keep you updated on this patient's follow up and I look forward to serving you and your patients again in the future.         Francois Abad MD

## 2023-01-10 NOTE — PROGRESS NOTES
Review of Systems   Constitutional:  Negative for appetite change, chills, fatigue and fever. Eyes:  Negative for pain and visual disturbance. Respiratory:  Negative for cough, shortness of breath and wheezing. Cardiovascular:  Negative for chest pain and leg swelling. Gastrointestinal:  Negative for abdominal pain, constipation, diarrhea, nausea and vomiting. Genitourinary:  Negative for difficulty urinating, dysuria, frequency, hematuria, penile pain and testicular pain. Musculoskeletal:  Negative for back pain and myalgias. Neurological:  Negative for dizziness, tremors, weakness, light-headedness, numbness and headaches. Hematological:  Negative for adenopathy. Does not bruise/bleed easily.  none
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
## 2023-01-11 ENCOUNTER — ANESTHESIA EVENT (OUTPATIENT)
Dept: OPERATING ROOM | Age: 63
End: 2023-01-11
Payer: MEDICARE

## 2023-01-11 NOTE — PRE-PROCEDURE INSTRUCTIONS
No answer, left message ? Unable to leave message ? When were you told to arrive at hospital ? -0630     Do you have a  ?-YES    Are you on any blood thinners ? -ASA                     If yes when did you stop taking ?-  >2 WEEKS AGO    Do you have your prep Rx filled and instruction ? -N/A     Nothing to eat the day before , only clear liquids. -NA    Are you experiencing any covid symptoms ? -NONE    Do you have any infections or rash we should be aware of ?-NONE      Do you have the Hibiclens soap to use the night before and the morning of surgery ? -YES    Nothing to eat or drink after midnight, only a sip of water to take any medication instructed to take the night before. -INSTRUCTED    Wear comfortable clothing, leave any valuables at home, remove any jewelry and body piercing . -INSTRUCTED

## 2023-01-12 ENCOUNTER — HOSPITAL ENCOUNTER (OUTPATIENT)
Age: 63
Setting detail: OUTPATIENT SURGERY
Discharge: HOME | End: 2023-01-12
Attending: ORTHOPAEDIC SURGERY | Admitting: ORTHOPAEDIC SURGERY
Payer: MEDICARE

## 2023-01-12 ENCOUNTER — ANESTHESIA (OUTPATIENT)
Dept: OPERATING ROOM | Age: 63
End: 2023-01-12
Payer: MEDICARE

## 2023-01-12 VITALS
OXYGEN SATURATION: 95 % | HEART RATE: 65 BPM | HEIGHT: 69 IN | TEMPERATURE: 97.5 F | RESPIRATION RATE: 16 BRPM | SYSTOLIC BLOOD PRESSURE: 166 MMHG | WEIGHT: 135 LBS | DIASTOLIC BLOOD PRESSURE: 99 MMHG | BODY MASS INDEX: 19.99 KG/M2

## 2023-01-12 PROCEDURE — 3600000004 HC SURGERY LEVEL 4 BASE: Performed by: ORTHOPAEDIC SURGERY

## 2023-01-12 PROCEDURE — 29828 SHO ARTHRS SRG BICP TENODSIS: CPT | Performed by: ORTHOPAEDIC SURGERY

## 2023-01-12 PROCEDURE — 3600000014 HC SURGERY LEVEL 4 ADDTL 15MIN: Performed by: ORTHOPAEDIC SURGERY

## 2023-01-12 PROCEDURE — 7100000000 HC PACU RECOVERY - FIRST 15 MIN: Performed by: ORTHOPAEDIC SURGERY

## 2023-01-12 PROCEDURE — 7100000030 HC ASPR PHASE II RECOVERY - FIRST 15 MIN: Performed by: ORTHOPAEDIC SURGERY

## 2023-01-12 PROCEDURE — 7100000001 HC PACU RECOVERY - ADDTL 15 MIN: Performed by: ORTHOPAEDIC SURGERY

## 2023-01-12 PROCEDURE — 2500000003 HC RX 250 WO HCPCS: Performed by: NURSE ANESTHETIST, CERTIFIED REGISTERED

## 2023-01-12 PROCEDURE — 29999 UNLISTED PX ARTHROSCOPY: CPT | Performed by: ORTHOPAEDIC SURGERY

## 2023-01-12 PROCEDURE — 6360000002 HC RX W HCPCS: Performed by: ORTHOPAEDIC SURGERY

## 2023-01-12 PROCEDURE — 6370000000 HC RX 637 (ALT 250 FOR IP): Performed by: ANESTHESIOLOGY

## 2023-01-12 PROCEDURE — 6360000002 HC RX W HCPCS: Performed by: ANESTHESIOLOGY

## 2023-01-12 PROCEDURE — 2580000003 HC RX 258: Performed by: ORTHOPAEDIC SURGERY

## 2023-01-12 PROCEDURE — 2720000010 HC SURG SUPPLY STERILE: Performed by: ORTHOPAEDIC SURGERY

## 2023-01-12 PROCEDURE — 6360000002 HC RX W HCPCS: Performed by: NURSE ANESTHETIST, CERTIFIED REGISTERED

## 2023-01-12 PROCEDURE — 6360000002 HC RX W HCPCS

## 2023-01-12 PROCEDURE — 64415 NJX AA&/STRD BRCH PLXS IMG: CPT | Performed by: ANESTHESIOLOGY

## 2023-01-12 PROCEDURE — 2580000003 HC RX 258: Performed by: NURSE ANESTHETIST, CERTIFIED REGISTERED

## 2023-01-12 PROCEDURE — 6370000000 HC RX 637 (ALT 250 FOR IP): Performed by: ORTHOPAEDIC SURGERY

## 2023-01-12 PROCEDURE — L3650 SO 8 ABD RESTRAINT PRE OTS: HCPCS | Performed by: ORTHOPAEDIC SURGERY

## 2023-01-12 PROCEDURE — 3700000000 HC ANESTHESIA ATTENDED CARE: Performed by: ORTHOPAEDIC SURGERY

## 2023-01-12 PROCEDURE — 2500000003 HC RX 250 WO HCPCS: Performed by: ANESTHESIOLOGY

## 2023-01-12 PROCEDURE — 7100000031 HC ASPR PHASE II RECOVERY - ADDTL 15 MIN: Performed by: ORTHOPAEDIC SURGERY

## 2023-01-12 PROCEDURE — 7100000011 HC PHASE II RECOVERY - ADDTL 15 MIN: Performed by: ORTHOPAEDIC SURGERY

## 2023-01-12 PROCEDURE — 3700000001 HC ADD 15 MINUTES (ANESTHESIA): Performed by: ORTHOPAEDIC SURGERY

## 2023-01-12 PROCEDURE — 29823 SHO ARTHRS SRG XTNSV DBRDMT: CPT | Performed by: ORTHOPAEDIC SURGERY

## 2023-01-12 PROCEDURE — 2580000003 HC RX 258: Performed by: ANESTHESIOLOGY

## 2023-01-12 PROCEDURE — 2709999900 HC NON-CHARGEABLE SUPPLY: Performed by: ORTHOPAEDIC SURGERY

## 2023-01-12 PROCEDURE — C1713 ANCHOR/SCREW BN/BN,TIS/BN: HCPCS | Performed by: ORTHOPAEDIC SURGERY

## 2023-01-12 PROCEDURE — 7100000010 HC PHASE II RECOVERY - FIRST 15 MIN: Performed by: ORTHOPAEDIC SURGERY

## 2023-01-12 PROCEDURE — C9290 INJ, BUPIVACAINE LIPOSOME: HCPCS | Performed by: ANESTHESIOLOGY

## 2023-01-12 DEVICE — ANCHOR SOFT SELF BUNCHING KL 1.8 FIBERTAK: Type: IMPLANTABLE DEVICE | Site: SHOULDER | Status: FUNCTIONAL

## 2023-01-12 DEVICE — IMPLANTABLE DEVICE: Type: IMPLANTABLE DEVICE | Site: SHOULDER | Status: FUNCTIONAL

## 2023-01-12 DEVICE — SYSTEM IMPL 4.75MM SWIVELOCK C ANCHR W/ SCORPION NDL 1 PRELD: Type: IMPLANTABLE DEVICE | Site: SHOULDER | Status: FUNCTIONAL

## 2023-01-12 RX ORDER — DIPHENHYDRAMINE HYDROCHLORIDE 50 MG/ML
12.5 INJECTION INTRAMUSCULAR; INTRAVENOUS
Status: DISCONTINUED | OUTPATIENT
Start: 2023-01-12 | End: 2023-01-12 | Stop reason: HOSPADM

## 2023-01-12 RX ORDER — SODIUM CHLORIDE 0.9 % (FLUSH) 0.9 %
5-40 SYRINGE (ML) INJECTION EVERY 12 HOURS SCHEDULED
Status: DISCONTINUED | OUTPATIENT
Start: 2023-01-12 | End: 2023-01-12 | Stop reason: HOSPADM

## 2023-01-12 RX ORDER — SODIUM CHLORIDE, SODIUM LACTATE, POTASSIUM CHLORIDE, CALCIUM CHLORIDE 600; 310; 30; 20 MG/100ML; MG/100ML; MG/100ML; MG/100ML
INJECTION, SOLUTION INTRAVENOUS CONTINUOUS
Status: DISCONTINUED | OUTPATIENT
Start: 2023-01-12 | End: 2023-01-12 | Stop reason: HOSPADM

## 2023-01-12 RX ORDER — EPHEDRINE SULFATE/0.9% NACL/PF 50 MG/5 ML
SYRINGE (ML) INTRAVENOUS PRN
Status: DISCONTINUED | OUTPATIENT
Start: 2023-01-12 | End: 2023-01-12 | Stop reason: SDUPTHER

## 2023-01-12 RX ORDER — ACETAMINOPHEN 325 MG/1
650 TABLET ORAL
Status: DISCONTINUED | OUTPATIENT
Start: 2023-01-12 | End: 2023-01-12 | Stop reason: HOSPADM

## 2023-01-12 RX ORDER — MIDAZOLAM HYDROCHLORIDE 1 MG/ML
2 INJECTION INTRAMUSCULAR; INTRAVENOUS ONCE
Status: COMPLETED | OUTPATIENT
Start: 2023-01-12 | End: 2023-01-12

## 2023-01-12 RX ORDER — GABAPENTIN 300 MG/1
300 CAPSULE ORAL ONCE
Status: COMPLETED | OUTPATIENT
Start: 2023-01-12 | End: 2023-01-12

## 2023-01-12 RX ORDER — SODIUM CHLORIDE 0.9 % (FLUSH) 0.9 %
5-40 SYRINGE (ML) INJECTION PRN
Status: DISCONTINUED | OUTPATIENT
Start: 2023-01-12 | End: 2023-01-12 | Stop reason: HOSPADM

## 2023-01-12 RX ORDER — SODIUM CHLORIDE 9 MG/ML
INJECTION, SOLUTION INTRAVENOUS PRN
Status: DISCONTINUED | OUTPATIENT
Start: 2023-01-12 | End: 2023-01-12 | Stop reason: HOSPADM

## 2023-01-12 RX ORDER — MEPERIDINE HYDROCHLORIDE 25 MG/ML
12.5 INJECTION INTRAMUSCULAR; INTRAVENOUS; SUBCUTANEOUS EVERY 5 MIN PRN
Status: DISCONTINUED | OUTPATIENT
Start: 2023-01-12 | End: 2023-01-12 | Stop reason: HOSPADM

## 2023-01-12 RX ORDER — ROCURONIUM BROMIDE 10 MG/ML
INJECTION, SOLUTION INTRAVENOUS PRN
Status: DISCONTINUED | OUTPATIENT
Start: 2023-01-12 | End: 2023-01-12 | Stop reason: SDUPTHER

## 2023-01-12 RX ORDER — OXYCODONE HYDROCHLORIDE AND ACETAMINOPHEN 5; 325 MG/1; MG/1
1 TABLET ORAL ONCE
Status: COMPLETED | OUTPATIENT
Start: 2023-01-12 | End: 2023-01-12

## 2023-01-12 RX ORDER — LOSARTAN POTASSIUM 25 MG/1
1 TABLET ORAL DAILY
COMMUNITY
Start: 2022-11-29

## 2023-01-12 RX ORDER — ONDANSETRON 2 MG/ML
4 INJECTION INTRAMUSCULAR; INTRAVENOUS
Status: DISCONTINUED | OUTPATIENT
Start: 2023-01-12 | End: 2023-01-12 | Stop reason: HOSPADM

## 2023-01-12 RX ORDER — PROPOFOL 10 MG/ML
INJECTION, EMULSION INTRAVENOUS PRN
Status: DISCONTINUED | OUTPATIENT
Start: 2023-01-12 | End: 2023-01-12 | Stop reason: SDUPTHER

## 2023-01-12 RX ORDER — BUPIVACAINE HYDROCHLORIDE 5 MG/ML
INJECTION, SOLUTION EPIDURAL; INTRACAUDAL
Status: COMPLETED | OUTPATIENT
Start: 2023-01-12 | End: 2023-01-12

## 2023-01-12 RX ORDER — FENTANYL CITRATE 0.05 MG/ML
25 INJECTION, SOLUTION INTRAMUSCULAR; INTRAVENOUS EVERY 5 MIN PRN
Status: COMPLETED | OUTPATIENT
Start: 2023-01-12 | End: 2023-01-12

## 2023-01-12 RX ORDER — ACETAMINOPHEN 500 MG
1000 TABLET ORAL ONCE
Status: COMPLETED | OUTPATIENT
Start: 2023-01-12 | End: 2023-01-12

## 2023-01-12 RX ORDER — DEXAMETHASONE SODIUM PHOSPHATE 10 MG/ML
10 INJECTION, SOLUTION INTRAMUSCULAR; INTRAVENOUS ONCE
Status: COMPLETED | OUTPATIENT
Start: 2023-01-12 | End: 2023-01-12

## 2023-01-12 RX ORDER — FENTANYL CITRATE 50 UG/ML
INJECTION, SOLUTION INTRAMUSCULAR; INTRAVENOUS PRN
Status: DISCONTINUED | OUTPATIENT
Start: 2023-01-12 | End: 2023-01-12 | Stop reason: SDUPTHER

## 2023-01-12 RX ORDER — LABETALOL HYDROCHLORIDE 5 MG/ML
10 INJECTION, SOLUTION INTRAVENOUS
Status: DISCONTINUED | OUTPATIENT
Start: 2023-01-12 | End: 2023-01-12 | Stop reason: HOSPADM

## 2023-01-12 RX ORDER — METOCLOPRAMIDE HYDROCHLORIDE 5 MG/ML
10 INJECTION INTRAMUSCULAR; INTRAVENOUS
Status: DISCONTINUED | OUTPATIENT
Start: 2023-01-12 | End: 2023-01-12 | Stop reason: HOSPADM

## 2023-01-12 RX ORDER — LIDOCAINE HYDROCHLORIDE 10 MG/ML
1 INJECTION, SOLUTION EPIDURAL; INFILTRATION; INTRACAUDAL; PERINEURAL
Status: DISCONTINUED | OUTPATIENT
Start: 2023-01-12 | End: 2023-01-12 | Stop reason: HOSPADM

## 2023-01-12 RX ORDER — BUPIVACAINE HYDROCHLORIDE 5 MG/ML
10 INJECTION, SOLUTION EPIDURAL; INTRACAUDAL ONCE
Status: DISCONTINUED | OUTPATIENT
Start: 2023-01-12 | End: 2023-01-12 | Stop reason: HOSPADM

## 2023-01-12 RX ORDER — HYDRALAZINE HYDROCHLORIDE 20 MG/ML
10 INJECTION INTRAMUSCULAR; INTRAVENOUS
Status: DISCONTINUED | OUTPATIENT
Start: 2023-01-12 | End: 2023-01-12 | Stop reason: HOSPADM

## 2023-01-12 RX ORDER — ONDANSETRON 2 MG/ML
INJECTION INTRAMUSCULAR; INTRAVENOUS PRN
Status: DISCONTINUED | OUTPATIENT
Start: 2023-01-12 | End: 2023-01-12 | Stop reason: SDUPTHER

## 2023-01-12 RX ORDER — LIDOCAINE HYDROCHLORIDE 20 MG/ML
INJECTION, SOLUTION EPIDURAL; INFILTRATION; INTRACAUDAL; PERINEURAL PRN
Status: DISCONTINUED | OUTPATIENT
Start: 2023-01-12 | End: 2023-01-12 | Stop reason: SDUPTHER

## 2023-01-12 RX ADMIN — HYDROMORPHONE HYDROCHLORIDE 0.5 MG: 1 INJECTION, SOLUTION INTRAMUSCULAR; INTRAVENOUS; SUBCUTANEOUS at 12:57

## 2023-01-12 RX ADMIN — GABAPENTIN 300 MG: 300 CAPSULE ORAL at 07:30

## 2023-01-12 RX ADMIN — SUGAMMADEX 100 MG: 100 INJECTION, SOLUTION INTRAVENOUS at 12:10

## 2023-01-12 RX ADMIN — PROPOFOL 150 MG: 10 INJECTION, EMULSION INTRAVENOUS at 08:45

## 2023-01-12 RX ADMIN — Medication 0.5 MG: at 12:57

## 2023-01-12 RX ADMIN — ROCURONIUM BROMIDE 10 MG: 10 INJECTION, SOLUTION INTRAVENOUS at 10:00

## 2023-01-12 RX ADMIN — PHENYLEPHRINE HYDROCHLORIDE 10 MCG/MIN: 10 INJECTION INTRAVENOUS at 08:59

## 2023-01-12 RX ADMIN — FENTANYL CITRATE 25 MCG: 50 INJECTION, SOLUTION INTRAMUSCULAR; INTRAVENOUS at 08:45

## 2023-01-12 RX ADMIN — ROCURONIUM BROMIDE 20 MG: 10 INJECTION, SOLUTION INTRAVENOUS at 09:10

## 2023-01-12 RX ADMIN — FENTANYL CITRATE 25 MCG: 50 INJECTION, SOLUTION INTRAMUSCULAR; INTRAVENOUS at 12:10

## 2023-01-12 RX ADMIN — FENTANYL CITRATE 25 MCG: 0.05 INJECTION, SOLUTION INTRAMUSCULAR; INTRAVENOUS at 12:42

## 2023-01-12 RX ADMIN — SODIUM CHLORIDE, POTASSIUM CHLORIDE, SODIUM LACTATE AND CALCIUM CHLORIDE: 600; 310; 30; 20 INJECTION, SOLUTION INTRAVENOUS at 07:51

## 2023-01-12 RX ADMIN — OXYCODONE HYDROCHLORIDE AND ACETAMINOPHEN 1 TABLET: 5; 325 TABLET ORAL at 13:40

## 2023-01-12 RX ADMIN — BUPIVACAINE 10 ML: 13.3 INJECTION, SUSPENSION, LIPOSOMAL INFILTRATION at 08:05

## 2023-01-12 RX ADMIN — FENTANYL CITRATE 25 MCG: 0.05 INJECTION, SOLUTION INTRAMUSCULAR; INTRAVENOUS at 12:52

## 2023-01-12 RX ADMIN — Medication 5 MG: at 09:45

## 2023-01-12 RX ADMIN — LIDOCAINE HYDROCHLORIDE 40 MG: 20 INJECTION, SOLUTION EPIDURAL; INFILTRATION; INTRACAUDAL; PERINEURAL at 08:45

## 2023-01-12 RX ADMIN — ROCURONIUM BROMIDE 50 MG: 10 INJECTION, SOLUTION INTRAVENOUS at 08:45

## 2023-01-12 RX ADMIN — MIDAZOLAM 2 MG: 1 INJECTION, SOLUTION INTRAMUSCULAR; INTRAVENOUS at 07:55

## 2023-01-12 RX ADMIN — FENTANYL CITRATE 50 MCG: 50 INJECTION, SOLUTION INTRAMUSCULAR; INTRAVENOUS at 09:35

## 2023-01-12 RX ADMIN — ONDANSETRON 4 MG: 2 INJECTION INTRAMUSCULAR; INTRAVENOUS at 08:55

## 2023-01-12 RX ADMIN — VANCOMYCIN HYDROCHLORIDE 1000 MG: 1 INJECTION, POWDER, LYOPHILIZED, FOR SOLUTION INTRAVENOUS at 07:54

## 2023-01-12 RX ADMIN — ACETAMINOPHEN 1000 MG: 500 TABLET ORAL at 07:30

## 2023-01-12 RX ADMIN — BUPIVACAINE HYDROCHLORIDE 10 ML: 5 INJECTION, SOLUTION EPIDURAL; INTRACAUDAL; PERINEURAL at 08:05

## 2023-01-12 RX ADMIN — DEXAMETHASONE SODIUM PHOSPHATE 10 MG: 10 INJECTION INTRAMUSCULAR; INTRAVENOUS at 08:54

## 2023-01-12 RX ADMIN — HYDROMORPHONE HYDROCHLORIDE: 1 INJECTION, SOLUTION INTRAMUSCULAR; INTRAVENOUS; SUBCUTANEOUS at 12:36

## 2023-01-12 ASSESSMENT — PAIN SCALES - GENERAL
PAINLEVEL_OUTOF10: 6
PAINLEVEL_OUTOF10: 8
PAINLEVEL_OUTOF10: 8
PAINLEVEL_OUTOF10: 6
PAINLEVEL_OUTOF10: 7
PAINLEVEL_OUTOF10: 8

## 2023-01-12 ASSESSMENT — PAIN DESCRIPTION - ONSET
ONSET: AWAKENED FROM SLEEP
ONSET: AWAKENED FROM SLEEP

## 2023-01-12 ASSESSMENT — PAIN DESCRIPTION - LOCATION
LOCATION: SHOULDER
LOCATION: SHOULDER
LOCATION: ARM

## 2023-01-12 ASSESSMENT — PAIN - FUNCTIONAL ASSESSMENT: PAIN_FUNCTIONAL_ASSESSMENT: 0-10

## 2023-01-12 ASSESSMENT — PAIN DESCRIPTION - DESCRIPTORS
DESCRIPTORS: ACHING
DESCRIPTORS: SORE
DESCRIPTORS: ACHING
DESCRIPTORS: ACHING;DISCOMFORT

## 2023-01-12 ASSESSMENT — PAIN DESCRIPTION - FREQUENCY
FREQUENCY: CONTINUOUS

## 2023-01-12 ASSESSMENT — PAIN DESCRIPTION - PAIN TYPE
TYPE: SURGICAL PAIN

## 2023-01-12 ASSESSMENT — PAIN DESCRIPTION - ORIENTATION
ORIENTATION: RIGHT

## 2023-01-12 ASSESSMENT — LIFESTYLE VARIABLES: SMOKING_STATUS: 0

## 2023-01-12 ASSESSMENT — ENCOUNTER SYMPTOMS
STRIDOR: 0
SHORTNESS OF BREATH: 0

## 2023-01-12 ASSESSMENT — COPD QUESTIONNAIRES: CAT_SEVERITY: NO INTERVAL CHANGE

## 2023-01-12 NOTE — ANESTHESIA PROCEDURE NOTES
Peripheral Block    Patient location during procedure: pre-op  Reason for block: post-op pain management and at surgeon's request  Start time: 1/12/2023 8:05 AM  End time: 1/12/2023 8:15 AM  Staffing  Performed: anesthesiologist   Anesthesiologist: Christy Gee MD  Preanesthetic Checklist  Completed: patient identified, IV checked, site marked, risks and benefits discussed, surgical/procedural consents, equipment checked, pre-op evaluation, timeout performed, anesthesia consent given, oxygen available, monitors applied/VS acknowledged, fire risk safety assessment completed and verbalized and blood product R/B/A discussed and consented  Peripheral Block   Patient position: supine  Prep: ChloraPrep  Provider prep: mask and sterile gloves  Patient monitoring: cardiac monitor, continuous pulse ox, frequent blood pressure checks, IV access, oxygen and responsive to questions  Block type: Brachial plexus  Interscalene  Laterality: right  Injection technique: single-shot  Guidance: ultrasound guided  Local infiltration: lidocaine  Infiltration strength: 1 %  Local infiltration: lidocaine  Dose: 5 mL    Needle   Needle type: short-bevel   Needle gauge: 22 G  Needle localization: ultrasound guidance  Test dose: negative  Needle length: 5 cm  Assessment   Injection assessment: negative aspiration for heme, no paresthesia on injection, local visualized surrounding nerve on ultrasound and no intravascular symptoms  Paresthesia pain: none  Slow fractionated injection: yes  Hemodynamics: stable  Real-time US image taken/store: yes    Medications Administered  bupivacaine (PF) 0.5 % - Perineural   10 mL - 1/12/2023 8:05:00 AM  bupivacaine liposome 1.3 % - Perineural   10 mL - 1/12/2023 8:05:00 AM

## 2023-01-12 NOTE — ANESTHESIA POSTPROCEDURE EVALUATION
POST- ANESTHESIA EVALUATION       Pt Name: Joanne Navarro  MRN: [id number]  [place]: 1960  Date of evaluation: 1/12/2023  Time:  2:22 PM      BP (!) 182/82   Pulse 58   Temp 97.5 °F (36.4 °C) (Infrared)   Resp 18   Ht 5' 9\" (1.753 m)   Wt 135 lb (61.2 kg)   SpO2 97%   BMI 19.94 kg/m²      Consciousness Level  Awake  Cardiopulmonary Status  Stable  Pain Adequately Treated YES  Nausea / Vomiting  NO  Adequate Hydration  YES  Anesthesia Related Complications NONE      Electronically signed by Linh Flores MD on 1/12/2023 at 2:22 PM       Department of Anesthesiology  Postprocedure Note    Patient: Joanne Navarro  MRN: [id number]  YOB: 1960  Date of evaluation: 1/12/2023      Procedure Summary     Date: 01/12/23 Room / Location: [address] [place] [address] / [address] [place] [address]    Anesthesia Start: 3746 Anesthesia Stop: 9002    Procedure: SHOULDER ARTHROSCOPIC, SUPERIOR CAPSULE RIGHT RECONSTRUCTION AND BICEP TENODESIS OF SHOULDER (Right: Shoulder) Diagnosis:       Tear of right rotator cuff, unspecified tear extent, unspecified whether traumatic      (RIGHT SHOULDER ROTATOR CUFF TEAR)    Surgeons: Lanning Gosselin, MD Responsible Provider: Mandie Alba MD    Anesthesia Type: general ASA Status: 3          Anesthesia Type: No value filed.     Sandeep Phase I: Sandeep Score: 8    Sandeep Phase II: Sandeep Score: 10      Anesthesia Post Evaluation

## 2023-01-12 NOTE — BRIEF OP NOTE
Brief Postoperative Note      Patient: Alessio Vizcarra  YOB: 1960  MRN: [id number]    Date of Procedure: 1/12/2023    Pre-Op Diagnosis: RIGHT SHOULDER ROTATOR CUFF TEAR    Post-Op Diagnosis: Same       Procedure(s):  SHOULDER ARTHROSCOPIC, SUPERIOR CAPSULE RIGHT RECONSTRUCTION AND BICEP TENODESIS OF SHOULDER    Surgeon(s):  Lucia Caruso MD    Assistant:  Resident: Melvi Escalera DO    Anesthesia: General    Estimated Blood Loss (mL): Minimal    Complications: None    Specimens:   * No specimens in log *    Implants:  Implant Name Type Inv.  Item Serial No.  Lot No. LRB No. Used Action   SYSTEM IMPL 4.75MM [employer] W/ SCORPION NDL 1 PRELD - [id number]  SYSTEM IMPL 4.75MM [employer] W/ SCORPION NDL 1 PRELD  [employer] [id number] Right 1 Implanted   ANCHOR SOFT SELF BUNCHING KL 1.8 [employer] - [id number]  ANCHOR SOFT SELF BUNCHING KL 1.8 [employer] [id number] Right 3 Implanted   ARTHROFLEX [id number] - [id number]  ARTHROFLEX [id number] [employer]  Right 1 Implanted         Drains: * No LDAs found *    Findings: See operative report    Electronically signed by Lucia Caruso MD on 1/12/2023 at 12:06 PM

## 2023-01-12 NOTE — PROGRESS NOTES
Patient writhing in pain and complains of severe pain in right arm post op. Dr Ana Musa updated. Patient medicated for pain as ordered. Dr Ana Musa at bedside to perform additional nerve block.
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
## 2023-01-12 NOTE — DISCHARGE INSTRUCTIONS
Michelle Sagastume MD  [address] in Shoulder and Elbow Surgery      POSTOPERATIVE INSTRUCTIONS  Surgery: Arthroscopic Superior Capsule Reconstruction    DIET  Begin with clear liquids and light foods (jellos, soups, etc.). Progress to your normal diet if you are not nauseated. WOUND CARE  Maintain your operative dressing, keeping it clean and dry. It is normal for the shoulder to bleed and swell following surgery - if blood soaks the bandage, do not become alarmed - reinforce with additional dressing. Remove surgical dressing on the second post-operative day - apply clean dressing (gauze and tape) and change daily. To avoid infection, keep surgical incisions clean and dry - you may shower by placing Seran wrap or Press and Seal over the surgical site before a shower, and afterwards, take everything off and apply clean gauze dressings - NO immersion of operative arm (i.e. bath). Alternatively, after youve changed your dressing for the first time you can place waterproof band aids over your incisions to take a shower and then a apply a clean new dressing afterwards    MEDICATIONS  Your nerve block should wear off in 24-36 hours. Start taking your prescribed pain medication before it does. Most patients will require some narcotic pain medication for a short period of time - this can be taken as per directions on the bottle. Common side effects of the pain medication are nausea, drowsiness, and  constipation - to decrease the side effects, take medication with food - if  constipation occurs, consider taking an over-the-counter laxative. If you are having problems with nausea and vomiting, take your prescribed anti-nausea medication if provided, otherwise contact the office to possibly  have your medication changed ([phone]). Do not drive a car or operate machinery while taking the narcotic medication.   If you are not allergic, Ibuprofen 200-600mg (i.e. Advil) may be taken in between the narcotic pain medication to help smooth out the post-operative peaks and valleys, reduce overall amount of pain medication required, and increase the time intervals between narcotic pain medication use. Do not take more than 2400mg in a day. Please resume all home medications, unless instructed otherwise. ACTIVITY  When sleeping or resting, inclined positions (i.e. reclining chair or stacked pillows behind you in bed) and a pillow under the forearm for support may provide better comfort. Do not engage in activities which increase pain/swelling (lifting or any repetitive above shoulder-level activities) over the first 14 days following surgery. Avoid long periods of sitting (without arm supported) or long distance travel. NO driving until instructed otherwise by physician. May return to work 5-7 days after surgery, if pain is tolerable and without use of your surgical arm. IMMOBILIZER  Your immobilizer or sling with supporting abduction pillow should be worn at all times (except for hygiene and exercises). Maintain your elbow position against the pillow and even with your side or in front  of this position to minimize stress on the repair. ICE THERAPY  Begin immediately after surgery. Do not place ice directly on the skin (place over an Ace wrap or thin clothing). Use icing machine continuously or ice packs (if machine not prescribed) every 1-2 hours for 20 minutes each time daily for the first week and then as needed after that for pain and swelling. Remember to keep arm supported while icing. EXERCISE  Perform pendulum exercises (leaning forward and letting the arm hang free and swing in slow circles) and full elbow/wrist/hand range of motion exercises 3 times per day. Formal physical therapy (PT) will begin in 2 weeks as scheduled with a prescription that will be provided during your post-operative visit.     Milana Chakraborty at [phone] if any of the following are present:  Painful swelling or numbness, Unrelenting pain, Fever (over 101 - it is normal to have a low grade fever for the first day or two following surgery) or chills, Redness around incisions, Color change in hand or fingers, Continuous drainage or bleeding from incision (a small amount of drainage is expected), Difficulty breathing, excessive nausea/vomiting  **If you have an emergency after office hours or on the weekend, contact the same office number ([phone]) and you will be connected to our page service - they will contact Dr. Charlotte Nolan or his partner if he is unavailable. **If you have an emergency that requires immediate attention, proceed to the nearest emergency room. FOLLOW-UP CARE / QUESTIONS  If you have any questions/concerns, please call the office [phone]. Contact the office to confirm your post-operative visit, which has been scheduled for two weeks following the date of surgery.

## 2023-01-12 NOTE — INTERVAL H&P NOTE
Update History & Physical  2  The patient's History and Physical of   December 29, 2022     Patient will be having : Procedure(s):  SHOULDER ARTHROSCOPIC  ROTATOR CUFF REPAIR  , POSSIBLE SUPERIOR CAPSULE RIGHT RECONSTRUCTION AND BICEP TENODESIS OF SHOULDER  The surgical site was confirmed by the  patient and me. There was no change. Or updates at this time. Patient did obtain medical and cardiac  clearance. Patient has been NPO since midnight. No blood thinners in the past 5-7 days. (Aspirin)    Patient denies any personal or family problems with anesthesia. Patient was physically assessed, including cardiovascular and respiratory. Murmur auscultated. Patient understands and wants to proceed with the procedure.      Electronically signed by TAYLOR Damon CNP on 1/12/2023 at 6:08 AM

## 2023-01-14 PROBLEM — S43.431A SUPERIOR GLENOID LABRUM LESION OF RIGHT SHOULDER: Status: ACTIVE | Noted: 2023-01-14

## 2023-01-14 PROBLEM — M75.121 NONTRAUMATIC COMPLETE TEAR OF RIGHT ROTATOR CUFF: Status: ACTIVE | Noted: 2023-01-14

## 2023-01-14 PROBLEM — M75.51 SUBACROMIAL BURSITIS OF RIGHT SHOULDER JOINT: Status: ACTIVE | Noted: 2023-01-14

## 2023-01-14 PROBLEM — S43.081A: Status: ACTIVE | Noted: 2023-01-14

## 2023-01-14 PROBLEM — M75.21 BICIPITAL TENDINITIS OF RIGHT SHOULDER: Status: ACTIVE | Noted: 2023-01-14

## 2023-01-14 NOTE — OP NOTE
Date of Procedure: 1/12/23     Preop Diagnosis:   1. Right Shoulder Massive Rotator Cuff Tear (M75.121)  2. Right Shoulder Superior Subluxation of the Humeral Head (S43.081)     Postop Diagnosis:   1. Right Shoulder Massive Rotator Cuff Tear (M75.121)  2. Right Shoulder Superior Subluxation of the Humeral Head (S43.081)  3. Posterior Labral Fraying     Procedure:    1. Right Shoulder Arthroscopic Superior Capsular Reconstruction   2. Right Shoulder Arthroscopic Biceps Tenodesis  3. Right Shoulder Arthroscopic Extensive Debridement     Surgeon: Sara Perez MD     Assistant: Chetan Dan DO (PGY 5)     Anesthesia: General with right interscalene nerve block     Blood Loss: Minimal     Findings: Massive irreparable supraspinatus and infraspinatus tears, Biceps intact, Subscapularis intact     Implants: Arthrex 3.0 mm peek knotless anchors x3, 4.75 mm bio composite swivel lock anchors x4     Surgical Indications:  Elena Keyes is a 58 y.o. old male  who presented with functionally limiting right shoulder pain despite non-operative measures. MRI confirmed the presence of a massive rotator cuff tear with some superior humeral head subluxation. Following a discussion with the patient regarding his treatment options, he elected to proceed with an arthroscopic right shoulder superior capsular reconstruction. He came to this decision after demonstrating a good understanding of our discussion. Risks, benefits, and alternatives were fully discussed. Postoperative limitations and limitations of the procedure were discussed in detail. The patient understood risks, alternatives, complications, & post-operative limitations and wishes to proceed. Operative Technique: Following appropriate identification of the patient and his operative extremity in the preoperative area, consent was reviewed with patient. Risks, benefits, and alternatives were discussed. All questions were answered.  The anesthesia service administered a right interscalene nerve block. The patient was taken back to the OR and transferred onto the operative table. General anesthesia was administered and his airway was secured. The patient was carefully positioned and secured in the beach-chair position, padding all prominences. SCDs and TEDs were placed on both legs. The right upper extremity was prepped and draped in the usual sterile fashion. The patient finished a course of pre-operative antibiotics by way of 2 G IV ancef. A time out was performed during which the correct patient, surgical site, and planned procedure were identified and then confirmed by the circulating nurse and anesthetic team.     The joint was insuflated with 30cc saline and a standard posterior portal was established. Diagnostic arthroscopy revealed full-thickness tears of the supraspinatus and infraspinatus tendons with complete retraction past the glenoid. Subscapularis was intact. Biceps was intact with some erythematous injection, posterior labral fraying, diffuse synovitis. An anterior portal was then established. An extensive debridement of frayed soft tissue and inflamed synovium was performed. The frayed posterior labrum was debrided. Through a separate anterolateral fascial incision the biceps tendon was released off of the superior glenoid and the inflamed section of the tendon was excised after passing some grasping [place] sutures using #2 FiberWire through the tendon. My attention was directed to the subacromial space. Through a separate lateral fascial incision, the subacromial space was entered. A thorough bursectomy was performed. The rotator cuff was determined to be irreparable and frayed edges of the rotator cuff were debrided to smooth surfaces. There was superior humeral head migration, and in the face of an irreparable rotator cuff, a decision was made to proceed with a superior capsule reconstruction.  The footprint of the rotator cuff was debrided to bleeding bone. The superior glenoid was also prepared elevating soft tissues off of the superior glenoid posterior to the labrum extending from the base of the coracoid into the posterior superior aspect of the glenoid. An anterolateral portal was established and Three Arthrex 1.8 mm Fibertak knotless anchors were placed along the superior border of the glenoid, and two 4.75 mm bio composite swivel lock anchors along the articular margin of the humeral head in preparation for for repair of the superior capsule. An arthroflex dermal allograft was prepared on the back table and cut to appropriate size for implantation. The graft was taken to the operative field and suture limbs from the previously inserted anchors were passed through appropriate locations in the graft prior insertion of the graft into the subacromial space. Thre graft was shuttled through the lateral cannula into the subacromial space and spread out. With the patients arm in approximately 30 degrees for forward elevation and abduction, the sutures medially on the glenoid were sequentially tightened effectively repairing the graft to the glenoid. On the lateral end of the graft, sutures were sequentially retrieved and using a pulley technique, tied. Individual fiber tape suture limbs from each swivel lock anchor in the humeral head were threaded together and inserted into anterolateral and posterolateral swivel lock anchors which were inserted lateral to the rotator cuff foot print resulting in a double row repair of the lateral end of the graft. The biceps tendon sutures were incorporated into the anterolateral swivel lock anchor resulting in a biceps tenodesis. With both medial and lateral ends of the graft secured this resulted in restoration of the superior capsular tissue and reduction of superior humeral head subluxation. The tendon of the infraspinatus was mobilized for repair.  With suture passed through the posterior aspect of the graft, margin convergence stitches were passed through the infraspinatus tendon to repair the posterior rotator cuff tendon. A single suture was passed through the anterolateral aspect of the graft and a portion of the comma tissue of the subscapularis tendon and tied in this location. At this point the graft was appropriately secured. The reconstruction was then reevaluated and found to be stable through a full range of motion. The camera was placed back into the joint, confirming appropriate reconstruction with an air tight seal.      Incisions were closed using a 3-0 prolene followed by a sterile dressing by way of 4x4 gauze and Tegaderm. His arm was placed in a sling/abduction pillow. The patient was awoken, transferred to his bed and wheeled to recovery in stable condition.      Complications: None     Condition: Stable

## 2023-01-23 ENCOUNTER — APPOINTMENT (OUTPATIENT)
Dept: GENERAL RADIOLOGY | Age: 63
End: 2023-01-23
Payer: MEDICARE

## 2023-01-23 ENCOUNTER — HOSPITAL ENCOUNTER (EMERGENCY)
Age: 63
Discharge: HOME | End: 2023-01-23
Attending: EMERGENCY MEDICINE
Payer: MEDICARE

## 2023-01-23 VITALS
BODY MASS INDEX: 19.99 KG/M2 | TEMPERATURE: 98 F | WEIGHT: 135 LBS | OXYGEN SATURATION: 98 % | RESPIRATION RATE: 17 BRPM | DIASTOLIC BLOOD PRESSURE: 93 MMHG | HEART RATE: 59 BPM | SYSTOLIC BLOOD PRESSURE: 129 MMHG | HEIGHT: 69 IN

## 2023-01-23 DIAGNOSIS — R00.2 PALPITATIONS: Primary | ICD-10-CM

## 2023-01-23 LAB
ABSOLUTE EOS #: 0.3 K/UL (ref 0–0.4)
ABSOLUTE LYMPH #: 2.2 K/UL (ref 1–4.8)
ABSOLUTE MONO #: 0.7 K/UL (ref 0.1–1.3)
ALBUMIN SERPL-MCNC: 3.7 G/DL (ref 3.5–5.2)
ALP BLD-CCNC: 77 U/L (ref 40–129)
ALT SERPL-CCNC: 9 U/L (ref 5–41)
ANION GAP SERPL CALCULATED.3IONS-SCNC: 7 MMOL/L (ref 9–17)
AST SERPL-CCNC: 12 U/L
BASOPHILS # BLD: 1 % (ref 0–2)
BASOPHILS ABSOLUTE: 0.1 K/UL (ref 0–0.2)
BILIRUB SERPL-MCNC: 0.2 MG/DL (ref 0.3–1.2)
BUN BLDV-MCNC: 11 MG/DL (ref 8–23)
CALCIUM SERPL-MCNC: 8.6 MG/DL (ref 8.6–10.4)
CHLORIDE BLD-SCNC: 100 MMOL/L (ref 98–107)
CO2: 29 MMOL/L (ref 20–31)
CREAT SERPL-MCNC: 0.89 MG/DL (ref 0.7–1.2)
EOSINOPHILS RELATIVE PERCENT: 3 % (ref 0–4)
GFR SERPL CREATININE-BSD FRML MDRD: >60 ML/MIN/1.73M2
GLUCOSE BLD-MCNC: 94 MG/DL (ref 70–99)
HCT VFR BLD CALC: 37.2 % (ref 41–53)
HEMOGLOBIN: 12.7 G/DL (ref 13.5–17.5)
LYMPHOCYTES # BLD: 27 % (ref 24–44)
MCH RBC QN AUTO: 31.1 PG (ref 26–34)
MCHC RBC AUTO-ENTMCNC: 34.2 G/DL (ref 31–37)
MCV RBC AUTO: 91 FL (ref 80–100)
MONOCYTES # BLD: 9 % (ref 1–7)
PDW BLD-RTO: 14.3 % (ref 11.5–14.9)
PLATELET # BLD: 298 K/UL (ref 150–450)
PMV BLD AUTO: 7.3 FL (ref 6–12)
POTASSIUM SERPL-SCNC: 4.3 MMOL/L (ref 3.7–5.3)
PRO-BNP: 136 PG/ML
RBC # BLD: 4.09 M/UL (ref 4.5–5.9)
REASON FOR REJECTION: NORMAL
SEG NEUTROPHILS: 60 % (ref 36–66)
SEGMENTED NEUTROPHILS ABSOLUTE COUNT: 5.1 K/UL (ref 1.3–9.1)
SODIUM BLD-SCNC: 136 MMOL/L (ref 135–144)
TOTAL PROTEIN: 6.8 G/DL (ref 6.4–8.3)
TROPONIN, HIGH SENSITIVITY: 12 NG/L (ref 0–22)
TROPONIN, HIGH SENSITIVITY: 13 NG/L (ref 0–22)
WBC # BLD: 8.4 K/UL (ref 3.5–11)
ZZ NTE CLEAN UP: ORDERED TEST: NORMAL
ZZ NTE WITH NAME CLEAN UP: SPECIMEN SOURCE: NORMAL

## 2023-01-23 PROCEDURE — 99285 EMERGENCY DEPT VISIT HI MDM: CPT

## 2023-01-23 PROCEDURE — 84484 ASSAY OF TROPONIN QUANT: CPT

## 2023-01-23 PROCEDURE — 36415 COLL VENOUS BLD VENIPUNCTURE: CPT

## 2023-01-23 PROCEDURE — 85025 COMPLETE CBC W/AUTO DIFF WBC: CPT

## 2023-01-23 PROCEDURE — 80053 COMPREHEN METABOLIC PANEL: CPT

## 2023-01-23 PROCEDURE — 71045 X-RAY EXAM CHEST 1 VIEW: CPT

## 2023-01-23 PROCEDURE — 93005 ELECTROCARDIOGRAM TRACING: CPT | Performed by: STUDENT IN AN ORGANIZED HEALTH CARE EDUCATION/TRAINING PROGRAM

## 2023-01-23 PROCEDURE — 83880 ASSAY OF NATRIURETIC PEPTIDE: CPT

## 2023-01-23 ASSESSMENT — PAIN - FUNCTIONAL ASSESSMENT
PAIN_FUNCTIONAL_ASSESSMENT: NONE - DENIES PAIN
PAIN_FUNCTIONAL_ASSESSMENT: 0-10

## 2023-01-23 ASSESSMENT — PAIN SCALES - GENERAL: PAINLEVEL_OUTOF10: 8

## 2023-01-23 ASSESSMENT — LIFESTYLE VARIABLES: HOW OFTEN DO YOU HAVE A DRINK CONTAINING ALCOHOL: NEVER

## 2023-01-23 ASSESSMENT — HEART SCORE
ECG: 1
ECG: 0

## 2023-01-23 NOTE — ED PROVIDER NOTES
[address] ED  Emergency Department Encounter  EmergencyMedicine Resident     Pt Rica Lozoya  MRN: [id number]  [place] 1960  Date of evaluation: 1/23/23  PCP:  TAYLOR Cedeno CNP    This patient was evaluated in the Emergency Department for symptoms described in the history of present illness. CHIEF COMPLAINT       Chief Complaint   Patient presents with    Chest Pain     Pt here with left sided chest pain that started last night, pt with some SOB, fatigued    Fatigue       HISTORY OF PRESENT ILLNESS  (Location/Symptom, Timing/Onset, Context/Setting, Quality, Duration, Modifying Factors, Severity.)      Denia Quezada is a 58 y.o. male who presents with heart palpitations. Patient states that around 2 AM this morning he felt some chest palpitations/discomfort. Patient states that he felt his heart \"thumping\" harder than it normally does. States that it was not really a pain but it made him uncomfortable. Denies nausea/vomiting, diaphoresis, back pain, radiation of this discomfort. Has never experienced this before. States that he is not experiencing the symptoms now. Denies shortness of breath. PAST MEDICAL / SURGICAL / SOCIAL / FAMILY HISTORY      has a past medical history of Abdominal pain, Ascending aortic aneurysm, Chronic pain, COPD (chronic obstructive pulmonary disease) ([address] Dyslipidemia, GERD (gastroesophageal reflux disease), Hepatitis A, History of rib fractures multiple, Hypertension, Other accident, Pneumonia, Rectal bleeding, Rectal pain, S/P thoracic aortic aneurysm repair, Severe single current episode of major depressive disorder, without psychotic features ([address] and Spinal fracture. 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 Social History     Socioeconomic History    Marital status:      Spouse name: Not on file    Number of children: Not on file    Years of education: Not on file    Highest education level: Not on file   Occupational History    Occupation: disabled--   Tobacco Use    Smoking status: Former     Packs/day: 1.00     Years: 22.00     Pack years: 22.00     Types: Cigarettes     Start date: 1972     Quit date: 1990     Years since quittin.4    Smokeless tobacco: Never   Vaping Use    Vaping Use: Never used   Substance and Sexual Activity    Alcohol use: No     Alcohol/week: 0.0 standard drinks     Comment: stopped drinking Set.      Drug use: Yes     Frequency: 3.0 times per week     Types: Marijuana Angelina Jan)     Comment: marijuana occasionally    Sexual activity: Not Currently   Other Topics Concern    Not on file   Social History Narrative    Not on file     Social Determinants of Health     Financial Resource Strain: Low Risk     Difficulty of Paying Living Expenses: Not hard at all   Food Insecurity: No Food Insecurity    Worried About Running Out of Food in the Last Year: Never true    Ran Out of Food in the Last Year: Never true   Transportation Needs: Not on file   Physical Activity: Inactive    Days of Exercise per Week: 0 days    Minutes of Exercise per Session: 0 min   Stress: Not on file   Social Connections: Not on file   Intimate Partner Violence: Not on file   Housing Stability: Not on file       Family History   Problem Relation Age of Onset    Heart Disease Mother     Diabetes Mother     Cancer Sister         colon-uterine    Diabetes Sister     Kidney Disease Sister     Diabetes Sister     Diabetes Sister     Diabetes Brother        Allergies:    Bactrim [sulfamethoxazole-trimethoprim], Keflex [cephalexin], Aleve [naproxen], Lyrica [pregabalin], Tramadol, and Vicodin [hydrocodone-acetaminophen]    Home Medications:  Prior to Admission medications    Medication Sig Start Date End Date Taking? Authorizing Provider   oxyCODONE-acetaminophen (PERCOCET) 5-325 MG per tablet Take 1 tablet by mouth every 8 hours as needed for Pain for up to 30 days. 1/23/23 2/22/23  TAYLOR Amaro CNP   losartan (COZAAR) 25 MG tablet Take 1 tablet by mouth daily 11/29/22   Historical Provider, MD   Handicap Placard [facility] It is my medical opinion that Manny Hughes requires a disability parking placard for the following reasons:  He has limited walking ability due to an arthritic condition. Duration of need: permanent 1/9/23   TAYLOR Amaro CNP   Blood Pressure Monitoring (WRIST BLOOD PRESSURE MONITOR) MISC Use daily prn for blood pressure check 1/9/23   TAYLOR Amaro CNP   Skin Protectants, Misc. (EUCERIN) cream Apply topically twice daily. 11/9/22   TAYLOR Amaro CNP   pyridoxine (RA VITAMIN B-6) 50 MG tablet Take 1 tablet by mouth daily 11/9/22 11/9/23  TAYLOR Amaro CNP   Cholecalciferol (VITAMIN D3) 125 MCG (5000 UT) TABS Take 1 tablet by mouth daily 11/9/22   TAYLOR Amaro CNP   triamcinolone (KENALOG) 0.025 % cream Apply topically 2 times daily.  11/9/22   TAYLOR Amaro CNP   gabapentin (NEURONTIN) 800 MG tablet TAKE ONE TABLET BY MOUTH FOUR TIMES A DAY 11/9/22 1/12/23  TAYLOR Amaro CNP   amLODIPine (NORVASC) 5 MG tablet  9/16/22   Historical Provider, MD   amitriptyline (ELAVIL) 25 MG tablet Take 1 tablet by mouth nightly 9/23/22   Mehdi Singh MD   alfuzosin (UROXATRAL) 10 MG extended release tablet Take 1 tablet by mouth daily 3/22/22   Yelena Matt MD   albuterol sulfate HFA (PROVENTIL HFA) 108 (90 Base) MCG/ACT inhaler Inhale 2 puffs into the lungs every 4 hours as needed for Wheezing or Shortness of Breath 10/27/21 TAYLOR Woods CNP   tiotropium (SPIRIVA RESPIMAT) 2.5 MCG/ACT AERS inhaler Inhale 2 puffs into the lungs daily 10/27/21   TAYLOR Woods CNP   aspirin 81 MG chewable tablet Take 1 tablet by mouth daily 12/2/19   TAYLOR Woods CNP   albuterol (PROVENTIL) (2.5 MG/3ML) 0.083% nebulizer solution Take 3 mLs by nebulization every 6 hours as needed for Wheezing  Patient not taking: No sig reported 12/2/19   TAYLOR Woods CNP   Blood Pressure KIT Diagnosis: HTN. Needs to check blood pressure 1-2 times a day until stable, then once a day. Goal blood pressure less than 135/85, and above 110/60. 1/17/19   Jackson Hardwick MD       REVIEW OF SYSTEMS    (2-9 systems for level 4, 10 or more for level 5)    Review of Systems   Constitutional:  Negative for chills and fever. HENT:  Negative for congestion. Eyes:  Negative for visual disturbance. Respiratory:  Negative for shortness of breath. Cardiovascular:  Positive for palpitations. Negative for chest pain. Gastrointestinal:  Negative for abdominal pain, constipation, diarrhea, nausea and vomiting. Genitourinary:  Negative for difficulty urinating and dysuria. Musculoskeletal:  Negative for back pain. Skin:  Negative for wound. Neurological:  Negative for weakness, numbness and headaches. PHYSICAL EXAM   (up to 7 for level 4, 8 or more for level 5)    INITIAL VITALS:   BP (!) 129/93   Pulse 59   Temp 98 °F (36.7 °C) (Oral)   Resp 17   Ht 5' 9\" (1.753 m)   Wt 135 lb (61.2 kg)   SpO2 98%   BMI 19.94 kg/m²   I have reviewed the triage vital signs. Const: Well nourished, well developed, appears stated age, nontoxic, no acute distress  Eyes: PERRL, no conjunctival injection  HENT: NCAT, Neck supple without meningismus   CV: RRR, Warm, well-perfused extremities. +2/4 radial pulses bilaterally. No leg swelling.   RESP: CTAB, Unlabored respiratory effort  GI: soft, non-tender, non-distended, no masses  MSK: No gross deformities appreciated  Skin: Warm, dry. No rashes  Neuro: Alert and oriented x4, GCS 15, CNs II-XII grossly intact. Sensation and motor function of extremities grossly intact. Psych: Appropriate mood and affect. DIFFERENTIAL  DIAGNOSIS   DDX:  ACS/MI, pneumonia, pneumothorax, arrhythmia, electrolyte abnormality, GERD, anxiety, MSK    Initial MDM:  78-year-old male presents with heart palpitations. Describes it as discomfort but states that it does not hurt. Describes it as a \"thumping\". Afebrile. Vital signs stable. Not experiencing symptoms in the emergency department at this moment. Patient denies nausea/vomiting, shortness of breath, diaphoresis, radiation of pain or discomfort. We will do cardiac work-up. Will reevaluate. PLAN (LABS / IMAGING / EKG):  Orders Placed This Encounter   Procedures    XR CHEST PORTABLE    CBC with Auto Differential    Comprehensive Metabolic Panel w/ Reflex to MG    Brain Natriuretic Peptide    SPECIMEN REJECTION    Troponin    EKG 12 Lead       MEDICATIONS ORDERED:  No orders of the defined types were placed in this encounter.         DIAGNOSTIC RESULTS / EMERGENCY DEPARTMENT COURSE / MDM   LAB RESULTS:  Results for orders placed or performed during the hospital encounter of 01/23/23   CBC with Auto Differential   Result Value Ref Range    WBC 8.4 3.5 - 11.0 k/uL    RBC 4.09 (L) 4.5 - 5.9 m/uL    Hemoglobin 12.7 (L) 13.5 - 17.5 g/dL    Hematocrit 37.2 (L) 41 - 53 %    MCV 91.0 80 - 100 fL    MCH 31.1 26 - 34 pg    MCHC 34.2 31 - 37 g/dL    RDW 14.3 11.5 - 14.9 %    Platelets 549 234 - 385 k/uL    MPV 7.3 6.0 - 12.0 fL    Seg Neutrophils 60 36 - 66 %    Lymphocytes 27 24 - 44 %    Monocytes 9 (H) 1 - 7 %    Eosinophils % 3 0 - 4 %    Basophils 1 0 - 2 %    Segs Absolute 5.10 1.3 - 9.1 k/uL    Absolute Lymph # 2.20 1.0 - 4.8 k/uL    Absolute Mono # 0.70 0.1 - 1.3 k/uL    Absolute Eos # 0.30 0.0 - 0.4 k/uL    Basophils Absolute 0.10 0.0 - 0.2 k/uL   Comprehensive Metabolic Panel w/ Reflex to MG   Result Value Ref Range    Glucose 94 70 - 99 mg/dL    BUN 11 8 - 23 mg/dL    Creatinine 0.89 0.70 - 1.20 mg/dL    Est, Glom Filt Rate >60 >60 mL/min/1.73m2    Calcium 8.6 8.6 - 10.4 mg/dL    Sodium 136 135 - 144 mmol/L    Potassium 4.3 3.7 - 5.3 mmol/L    Chloride 100 98 - 107 mmol/L    CO2 29 20 - 31 mmol/L    Anion Gap 7 (L) 9 - 17 mmol/L    Alkaline Phosphatase 77 40 - 129 U/L    ALT 9 5 - 41 U/L    AST 12 <40 U/L    Total Bilirubin 0.2 (L) 0.3 - 1.2 mg/dL    Total Protein 6.8 6.4 - 8.3 g/dL    Albumin 3.7 3.5 - 5.2 g/dL   Troponin   Result Value Ref Range    Troponin, High Sensitivity 12 0 - 22 ng/L   Brain Natriuretic Peptide   Result Value Ref Range    Pro- <300 pg/mL   SPECIMEN REJECTION   Result Value Ref Range    Specimen Source . BLOOD     Ordered Test  TROPI     Reason for Rejection Unable to perform testing: Specimen hemolyzed. Troponin   Result Value Ref Range    Troponin, High Sensitivity 13 0 - 22 ng/L       Lab work is unremarkable    RADIOLOGY:  XR CHEST PORTABLE    Result Date: 1/23/2023  EXAMINATION: ONE XRAY VIEW OF THE CHEST 1/23/2023 2:09 pm COMPARISON: 10/12/2022 HISTORY: ORDERING SYSTEM PROVIDED HISTORY: chest pain TECHNOLOGIST PROVIDED HISTORY: chest pain Reason for Exam: Chest pain FINDINGS: Postsurgical changes overlying the mediastinum. The cardiac size is normal. No acute infiltrates or pleural effusions are seen. Pulmonary vascularity appears normal. . Posttraumatic deformity of the mid left clavicle. .  Old left rib fractures. No acute bony abnormalities.  The hilar structures are normal.     No acute cardiopulmonary disease      EKG  Rhythm: normal sinus   Rate: 50-60  Axis: normal  Ectopy: none  Conduction: normal  ST Segments: no acute change  T Waves: no acute change  Q Waves: nonspecific    EKG  Impression: non-specific EKG     All EKG's are interpreted by the Emergency Department Physician who either signs or Co-signs this chart in the absence of a cardiologist.    Heart score  History: 0  EC  Patient Age: 1  *Risk factors for Atherosclerotic disease: Hypertension  Risk Factors: 1  Troponin: 0  Heart Score Total: 3    MDM/EMERGENCY DEPARTMENT COURSE:  Upon reevaluation patient resting comfortably, no acute distress. Encourage patient to follow-up with primary care physician. Given return precautions. Patient discharged home. Patient understands and agrees with plan. CRITICAL CARE:  Please see Attending Note    FINAL IMPRESSION      1. Palpitations          DISPOSITION / PLAN     DISPOSITION Decision To Discharge 2023 07:09:42 PM    PATIENT REFERRED TO:  TAYLOR Villegas CNP  [address] [facility]  [address] [facility] [address]  [facility] [address]  [phone]    Schedule an appointment as soon as possible for a visit   As needed    [facility] ED  [facility] [address] [phone]  Go to   If symptoms worsen    DISCHARGE MEDICATIONS:  Discharge Medication List as of 2023  7:12 PM        START taking these medications    Details   oxyCODONE-acetaminophen (PERCOCET) 5-325 MG per tablet Take 1 tablet by mouth every 8 hours as needed for Pain for up to 30 days. , Disp-90 tablet, R-0Normal             Amanda Garcia DO  Emergency Medicine Resident, PGY 2    (Please note that portions of this note were completed with a voice recognition program.  Efforts were made to edit the dictations but occasionally words are mis-transcribed.)       Amanda Garcia DO  Resident  [phone]

## 2023-01-24 LAB
EKG ATRIAL RATE: 59 BPM
EKG P AXIS: 82 DEGREES
EKG P-R INTERVAL: 154 MS
EKG Q-T INTERVAL: 434 MS
EKG QRS DURATION: 100 MS
EKG QTC CALCULATION (BAZETT): 429 MS
EKG R AXIS: 66 DEGREES
EKG T AXIS: 49 DEGREES
EKG VENTRICULAR RATE: 59 BPM

## 2023-01-24 PROCEDURE — 93010 ELECTROCARDIOGRAM REPORT: CPT | Performed by: INTERNAL MEDICINE

## 2023-01-24 ASSESSMENT — ENCOUNTER SYMPTOMS
NAUSEA: 0
DIARRHEA: 0
SHORTNESS OF BREATH: 0
BACK PAIN: 0
CONSTIPATION: 0
VOMITING: 0
ABDOMINAL PAIN: 0

## 2023-01-24 NOTE — DISCHARGE INSTRUCTIONS
You were evaluated in the emergency department for heart palpitations. Your work-up in the emergency department did not reveal any acute abnormalities. Your EKG and chest x-ray did not reveal any acute abnormalities. Please follow-up with Dr. Reva Guerin. Please call his office in the morning. Return to the emergency department for any worsening symptoms including but not limited to chest pain, shortness of breath, back pain, nausea/vomiting, sweating, dizziness, change in vision or hearing, inability to stand or walk, abdominal pain, numbness/tingling, or any other questions or concerns.

## 2023-01-24 NOTE — ED PROVIDER NOTES
[address]     Pt Name: Maryann Giraldo  MRN: [id number]  [place] 1960  Date of evaluation: 1/23/23       Maryann Giraldo is a 58 y.o. male who presents with Chest Pain (Pt here with left sided chest pain that started last night, pt with some SOB, fatigued) and Fatigue      MDM:   Felt palpitation last night while in bed couple episodes, no chest pain or dyspnea or nausea vomiting or diaphoresis. He has no symptoms now. Heart score is a 3. Do not suspect PE or aortic dissection or acute MI or ACS. Follow-up with his outpatient PCP and cardiologist tomorrow. He agrees with this plan. Vitals:   Vitals:    01/23/23 1658 01/23/23 1745 01/23/23 1800 01/23/23 1830   BP:  122/84 107/73 111/77   Pulse: 69 57 50 52   Resp:  20 15 16   Temp:       TempSrc:       SpO2:  94% 94% 95%   Weight:       Height:             I personally saw and examined the patient. I have reviewed and agree with the resident's findings, including all diagnostic interpretations and treatment plan as written. I was present for the key portions of any procedures performed and the inclusive time noted for any critical care statement. The care is provided during an unprecedented national emergency due to the novel coronavirus, COVID 19.   Lina Galdamez MD  Attending Emergency Physician           Lina Galdamez MD  01/23/23 [id number]

## 2023-01-27 ENCOUNTER — OFFICE VISIT (OUTPATIENT)
Dept: ORTHOPEDIC SURGERY | Age: 63
End: 2023-01-27

## 2023-01-27 VITALS — WEIGHT: 135 LBS | RESPIRATION RATE: 16 BRPM | BODY MASS INDEX: 19.99 KG/M2 | HEIGHT: 69 IN

## 2023-01-27 DIAGNOSIS — Z98.890 S/P ARTHROSCOPY OF SHOULDER: Primary | ICD-10-CM

## 2023-01-27 PROCEDURE — 99024 POSTOP FOLLOW-UP VISIT: CPT | Performed by: ORTHOPAEDIC SURGERY

## 2023-01-27 NOTE — PROGRESS NOTES
Procedure: Right shoulder arthroscopic superior capsular reconstruction and biceps tenodesis  Date of procedure: 1/12/2023    HPI:  Misha Rodriguez is a 58 y.o. male who is approximately 2 weeks status post aforementioned procedure. Indicates that he is doing relatively well. His pain is rated as a 4/10. He denies having any fevers, chills, sweats or any constitutional symptoms. He has been compliant with his pendulums and sling immobilization. Physical examination:  Evaluation of patient's right shoulder and upper extremity demonstrates his incisions to be clean, dry, intact. There is no warmth, erythema, wound dehiscence or drainage. Sensation is grossly intact light touch in all dermatomes and he has a 2+ radial pulse with brisk capillary refill in his fingers. Impression and plan:  Misha Rodriguez is a 58 y.o. male who is approximately 2 weeks status post an arthroscopic right shoulder superior capsular reconstruction and biceps tenodesis. He is doing relatively well at this time. We reviewed his arthroscopic images. His sutures were taken out and Steri-Strips and clean dressings applied. He may now get his incisions wet in the shower. He is to continue on with his immobilization. He will also continue on with daily pendulum exercises 3 times a day. I'll see him back for reevaluation in 4 weeks but he was encouraged to return or call earlier with questions and/or concerns.

## 2023-02-20 ENCOUNTER — OFFICE VISIT (OUTPATIENT)
Dept: ORTHOPEDIC SURGERY | Age: 63
End: 2023-02-20

## 2023-02-20 VITALS — RESPIRATION RATE: 16 BRPM | WEIGHT: 138 LBS | BODY MASS INDEX: 20.44 KG/M2 | HEIGHT: 69 IN

## 2023-02-20 DIAGNOSIS — Z98.890 S/P ARTHROSCOPY OF SHOULDER: Primary | ICD-10-CM

## 2023-02-20 PROCEDURE — 99024 POSTOP FOLLOW-UP VISIT: CPT | Performed by: ORTHOPAEDIC SURGERY

## 2023-02-20 NOTE — PROGRESS NOTES
Procedure: Right shoulder arthroscopic superior capsular reconstruction and biceps tenodesis  Date of procedure: 1/12/2023    HPI:  Elena Keyes is a 58 y.o. male who is approximately 6 weeks status post aforementioned procedure. He indicates that he is feeling better. His pain is rated as a 4/10. He denies having any fevers, chills, sweats or any constitutional symptoms. He has been compliant with his  pendulums and sling immobilization. Physical examination:  Evaluation of patient's right shoulder and upper extremity demonstrates his incisions to be appropriately healed. There is no warmth, erythema, wound dehiscence or drainage. Sensation is grossly intact light touch in all dermatomes and he has a 2+ radial pulse with brisk capillary refill in his fingers. Impression and plan:  Elena Keyes is a 58 y.o. male who is 6 weeks status post an arthroscopic right shoulder superior capsular reconstruction and biceps tenodesis. He is doing relatively well at this time. He is to discontinue his sling. I will advance his home exercise program to begin working on active assisted ROM. He was provided and taught this today. He may begin use of this arm for light activities of daily living not to exceed 1 pound of pushing, pulling, or lifting. I'll see him back for reevaluation in 6 weeks but he was encouraged to return or call earlier with questions and/or concerns.

## 2023-02-24 ENCOUNTER — TELEPHONE (OUTPATIENT)
Dept: OTHER | Age: 63
End: 2023-02-24

## 2023-02-24 NOTE — TELEPHONE ENCOUNTER
Patient states that the [address] [facility] is out of stock on [address] [facility].  Could you transfer the medication to the [address] on [facility]. He only has one tablet left.

## 2023-03-05 ENCOUNTER — HOSPITAL ENCOUNTER (EMERGENCY)
Age: 63
Discharge: LEFT BEFORE DISCHARGE | End: 2023-03-05

## 2023-03-05 VITALS
SYSTOLIC BLOOD PRESSURE: 147 MMHG | HEIGHT: 69 IN | TEMPERATURE: 98.2 F | DIASTOLIC BLOOD PRESSURE: 87 MMHG | WEIGHT: 140 LBS | OXYGEN SATURATION: 98 % | BODY MASS INDEX: 20.73 KG/M2 | HEART RATE: 75 BPM | RESPIRATION RATE: 18 BRPM

## 2023-03-05 ASSESSMENT — PAIN - FUNCTIONAL ASSESSMENT: PAIN_FUNCTIONAL_ASSESSMENT: 0-10

## 2023-03-05 ASSESSMENT — LIFESTYLE VARIABLES
HOW OFTEN DO YOU HAVE A DRINK CONTAINING ALCOHOL: NEVER
HOW MANY STANDARD DRINKS CONTAINING ALCOHOL DO YOU HAVE ON A TYPICAL DAY: PATIENT DOES NOT DRINK

## 2023-03-05 ASSESSMENT — PAIN SCALES - GENERAL: PAINLEVEL_OUTOF10: 7

## 2023-03-09 ENCOUNTER — APPOINTMENT (OUTPATIENT)
Dept: GENERAL RADIOLOGY | Age: 63
End: 2023-03-09
Payer: MEDICARE

## 2023-03-09 ENCOUNTER — HOSPITAL ENCOUNTER (EMERGENCY)
Age: 63
Discharge: HOME | End: 2023-03-09
Attending: EMERGENCY MEDICINE
Payer: MEDICARE

## 2023-03-09 VITALS
SYSTOLIC BLOOD PRESSURE: 155 MMHG | TEMPERATURE: 100.6 F | DIASTOLIC BLOOD PRESSURE: 88 MMHG | OXYGEN SATURATION: 96 % | RESPIRATION RATE: 18 BRPM | WEIGHT: 140 LBS | HEART RATE: 92 BPM | HEIGHT: 69 IN | BODY MASS INDEX: 20.73 KG/M2

## 2023-03-09 DIAGNOSIS — U07.1 COVID-19: ICD-10-CM

## 2023-03-09 DIAGNOSIS — R52 GENERALIZED BODY ACHES: Primary | ICD-10-CM

## 2023-03-09 PROCEDURE — 99284 EMERGENCY DEPT VISIT MOD MDM: CPT

## 2023-03-09 PROCEDURE — 6360000002 HC RX W HCPCS

## 2023-03-09 PROCEDURE — 96374 THER/PROPH/DIAG INJ IV PUSH: CPT

## 2023-03-09 PROCEDURE — 71045 X-RAY EXAM CHEST 1 VIEW: CPT

## 2023-03-09 RX ORDER — KETOROLAC TROMETHAMINE 30 MG/ML
30 INJECTION, SOLUTION INTRAMUSCULAR; INTRAVENOUS ONCE
Status: COMPLETED | OUTPATIENT
Start: 2023-03-09 | End: 2023-03-09

## 2023-03-09 RX ADMIN — KETOROLAC TROMETHAMINE 30 MG: 30 INJECTION, SOLUTION INTRAMUSCULAR; INTRAVENOUS at 19:46

## 2023-03-09 ASSESSMENT — ENCOUNTER SYMPTOMS
NAUSEA: 0
SORE THROAT: 0
CONSTIPATION: 0
RHINORRHEA: 0
COUGH: 1
WHEEZING: 0
DIARRHEA: 0
SHORTNESS OF BREATH: 0
VOMITING: 0
ABDOMINAL PAIN: 0

## 2023-03-09 ASSESSMENT — PAIN SCALES - GENERAL: PAINLEVEL_OUTOF10: 10

## 2023-03-09 ASSESSMENT — PAIN - FUNCTIONAL ASSESSMENT: PAIN_FUNCTIONAL_ASSESSMENT: 0-10

## 2023-03-10 NOTE — ED NOTES
Pt tolerates ambulatory challenge well with spO2 holding at 95%.       Betty Guerrero RN  03/09/23 [id number]

## 2023-03-10 NOTE — DISCHARGE INSTRUCTIONS
Call 911 anytime you think you may need emergency care. For example, call if you have life-threatening symptoms, such as: You have severe trouble breathing. (You can't talk at all.)     You have constant chest pain or pressure. You are severely dizzy or lightheaded. You are confused or can't think clearly. You have pale, gray, or blue-colored skin or lips. You pass out (lose consciousness) or are very hard to wake up. You have loss of balance or trouble walking. You have trouble seeing out of one or both eyes. You have weakness or drooping on one side of the face. You have weakness or numbness in an arm or a leg. You have trouble speaking. You have a severe headache. You have a seizure. Call your doctor now or seek immediate medical care if:    You have moderate trouble breathing. (You can't speak a full sentence.)     You are coughing up blood. You have signs of low blood pressure. These include feeling lightheaded; being too weak to stand; and having cold, pale, clammy skin. Watch closely for changes in your health, and be sure to contact your doctor if:    Your symptoms get worse. You are not getting better as expected. You have new or worse symptoms of anxiety, depression, nightmares, or flashbacks.

## 2023-03-10 NOTE — ED PROVIDER NOTES
[facility]     Emergency Department     Faculty Attestation    I performed a history and physical examination of the patient and discussed management with the resident. I reviewed the residents note and agree with the documented findings and plan of care. Any areas of disagreement are noted on the chart. I was personally present for the key portions of any procedures. I have documented in the chart those procedures where I was not present during the key portions. I have reviewed the emergency nurses triage note. I agree with the chief complaint, past medical history, past surgical history, allergies, medications, social and family history as documented unless otherwise noted below. For Physician Assistant/ Nurse Practitioner cases/documentation I have personally evaluated this patient and have completed at least one if not all key elements of the E/M (history, physical exam, and MDM). Additional findings are as noted. I have personally seen and evaluated the patient. I find the patient's history and physical exam are consistent with the NP/PA documentation. I agree with the care provided, treatment rendered, disposition and follow-up plan. 66-year-old male with history of chronic pain on oxycodone and gabapentin presenting with body aches. Patient tested positive for COVID at home. He has had COVID positive sick contacts recently. He has been vaccinated against COVID, but did not get a booster. He has been coughing, tolerating some oral intake. Denies vomiting. Family states that he was unable to walk today due to the pain in his muscles. Last took medications >6 hrs ago. Exam:  General : Laying on the bed, awake, alert, and in no acute distress  CV : normal rate and regular rhythm  Lungs : Breathing comfortably on room air with no tachypnea, hypoxia, or increased work of breathing    DDx: COVID, myalgias.   Possible dehydration, although not tachycardic and mucous membranes appear moist.  No strenuous activity to suggest traumatic rhabdomyolysis. Plan:  NSAIDs to treat both fever and myalgias  Chest x-ray to ensure no pneumonia given fever    Medical Decision Making  Amount and/or Complexity of Data Reviewed  Radiology: ordered. Risk  Prescription drug management.       Grant Umaña MD   Attending Emergency Physician    (Please note that portions of this note were completed with a voice recognition program. Efforts were made to edit the dictations but occasionally words are mis-transcribed.)            Grant Umaña MD  03/09/23 1935

## 2023-03-10 NOTE — ED TRIAGE NOTES
Pt brought to room 36 from triage with c/o having a positive at home covid test. Pt reports body aches and pain x 1 day and weakness in his legs which he states has made him unable to walk. Pt denies N/V/D or other symptoms. Breathing is non-labored. Call light is within reach.
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
## 2023-05-15 PROBLEM — J44.9 CHRONIC OBSTRUCTIVE PULMONARY DISEASE, UNSPECIFIED COPD TYPE (HCC): Status: ACTIVE | Noted: 2023-05-15

## 2023-05-15 PROBLEM — F11.20 OPIOID DEPENDENCE WITH CURRENT USE (HCC): Status: ACTIVE | Noted: 2023-05-15

## 2023-05-15 PROBLEM — I73.9 PAD (PERIPHERAL ARTERY DISEASE) (HCC): Status: ACTIVE | Noted: 2023-05-15

## 2023-05-19 ENCOUNTER — OFFICE VISIT (OUTPATIENT)
Dept: UROLOGY | Age: 63
End: 2023-05-19
Payer: MEDICARE

## 2023-05-19 VITALS
WEIGHT: 143 LBS | TEMPERATURE: 98 F | SYSTOLIC BLOOD PRESSURE: 152 MMHG | HEART RATE: 74 BPM | DIASTOLIC BLOOD PRESSURE: 90 MMHG | BODY MASS INDEX: 21.18 KG/M2 | HEIGHT: 69 IN

## 2023-05-19 DIAGNOSIS — R39.12 WEAK URINARY STREAM: Primary | ICD-10-CM

## 2023-05-19 DIAGNOSIS — N52.9 ERECTILE DYSFUNCTION, UNSPECIFIED ERECTILE DYSFUNCTION TYPE: ICD-10-CM

## 2023-05-19 PROCEDURE — 3077F SYST BP >= 140 MM HG: CPT | Performed by: UROLOGY

## 2023-05-19 PROCEDURE — 3080F DIAST BP >= 90 MM HG: CPT | Performed by: UROLOGY

## 2023-05-19 PROCEDURE — 99214 OFFICE O/P EST MOD 30 MIN: CPT | Performed by: UROLOGY

## 2023-05-19 ASSESSMENT — ENCOUNTER SYMPTOMS
ABDOMINAL PAIN: 0
EYE PAIN: 0
BACK PAIN: 0
EYE REDNESS: 0
VOMITING: 0
WHEEZING: 0
DIARRHEA: 0
COUGH: 0
NAUSEA: 0
SHORTNESS OF BREATH: 0
CONSTIPATION: 0
GASTROINTESTINAL NEGATIVE: 1
EYES NEGATIVE: 1
RESPIRATORY NEGATIVE: 1
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
## 2023-05-19 NOTE — PROGRESS NOTES
Review of Systems   Constitutional: Negative.  Negative for appetite change, chills and fatigue.   Eyes: Negative.  Negative for pain, redness and visual disturbance.   Respiratory: Negative.  Negative for cough, shortness of breath and wheezing.    Cardiovascular: Negative.  Negative for chest pain and leg swelling.   Gastrointestinal: Negative.  Negative for abdominal pain, constipation, diarrhea, nausea and vomiting.   Genitourinary:  Positive for decreased urine volume. Negative for difficulty urinating, dysuria, flank pain, frequency, hematuria and urgency.   Musculoskeletal: Negative.  Negative for back pain, joint swelling and myalgias.   Skin: Negative.  Negative for rash and wound.   Neurological: Negative.  Negative for dizziness, weakness and numbness.   Hematological: Negative.  Does not bruise/bleed easily.

## 2023-05-22 ENCOUNTER — HOSPITAL ENCOUNTER (OUTPATIENT)
Age: 63
Discharge: HOME | End: 2023-05-22
Payer: MEDICARE

## 2023-05-22 DIAGNOSIS — E55.9 VITAMIN D DEFICIENCY: ICD-10-CM

## 2023-05-22 DIAGNOSIS — I10 ESSENTIAL HYPERTENSION: ICD-10-CM

## 2023-05-22 DIAGNOSIS — R39.12 WEAK URINARY STREAM: ICD-10-CM

## 2023-05-22 DIAGNOSIS — Z12.5 PROSTATE CANCER SCREENING: ICD-10-CM

## 2023-05-22 LAB
25(OH)D3 SERPL-MCNC: 13.3 NG/ML
ALBUMIN SERPL-MCNC: 4.1 G/DL (ref 3.5–5.2)
ALP SERPL-CCNC: 78 U/L (ref 40–129)
ALT SERPL-CCNC: 14 U/L (ref 5–41)
ANION GAP SERPL CALCULATED.3IONS-SCNC: 11 MMOL/L (ref 9–17)
AST SERPL-CCNC: 16 U/L
BASOPHILS # BLD: 0.1 K/UL (ref 0–0.2)
BASOPHILS NFR BLD: 1 % (ref 0–2)
BILIRUB SERPL-MCNC: 0.2 MG/DL (ref 0.3–1.2)
BUN SERPL-MCNC: 12 MG/DL (ref 8–23)
CALCIUM SERPL-MCNC: 8.9 MG/DL (ref 8.6–10.4)
CHLORIDE SERPL-SCNC: 101 MMOL/L (ref 98–107)
CHOLEST SERPL-MCNC: 223 MG/DL
CHOLESTEROL/HDL RATIO: 5.6
CO2 SERPL-SCNC: 27 MMOL/L (ref 20–31)
CREAT SERPL-MCNC: 0.85 MG/DL (ref 0.7–1.2)
EOSINOPHIL # BLD: 0.2 K/UL (ref 0–0.4)
EOSINOPHILS RELATIVE PERCENT: 3 % (ref 0–4)
ERYTHROCYTE [DISTWIDTH] IN BLOOD BY AUTOMATED COUNT: 14.7 % (ref 11.5–14.9)
GFR SERPL CREATININE-BSD FRML MDRD: >60 ML/MIN/1.73M2
GLUCOSE SERPL-MCNC: 103 MG/DL (ref 70–99)
HCT VFR BLD AUTO: 44.1 % (ref 41–53)
HDLC SERPL-MCNC: 40 MG/DL
HGB BLD-MCNC: 14.6 G/DL (ref 13.5–17.5)
LDLC SERPL CALC-MCNC: 168 MG/DL (ref 0–130)
LYMPHOCYTES # BLD: 23 % (ref 24–44)
LYMPHOCYTES NFR BLD: 1.7 K/UL (ref 1–4.8)
MCH RBC QN AUTO: 30.7 PG (ref 26–34)
MCHC RBC AUTO-ENTMCNC: 33.2 G/DL (ref 31–37)
MCV RBC AUTO: 92.6 FL (ref 80–100)
MONOCYTES NFR BLD: 0.5 K/UL (ref 0.1–1.3)
MONOCYTES NFR BLD: 7 % (ref 1–7)
NEUTROPHILS NFR BLD: 66 % (ref 36–66)
NEUTS SEG NFR BLD: 4.7 K/UL (ref 1.3–9.1)
PLATELET # BLD AUTO: 229 K/UL (ref 150–450)
PMV BLD AUTO: 8.5 FL (ref 6–12)
POTASSIUM SERPL-SCNC: 4.5 MMOL/L (ref 3.7–5.3)
PROT SERPL-MCNC: 7.2 G/DL (ref 6.4–8.3)
PSA SERPL-MCNC: 0.72 NG/ML
RBC # BLD AUTO: 4.76 M/UL (ref 4.5–5.9)
SODIUM SERPL-SCNC: 139 MMOL/L (ref 135–144)
TRIGL SERPL-MCNC: 77 MG/DL
WBC OTHER # BLD: 7.2 K/UL (ref 3.5–11)

## 2023-05-22 PROCEDURE — 80053 COMPREHEN METABOLIC PANEL: CPT

## 2023-05-22 PROCEDURE — 82306 VITAMIN D 25 HYDROXY: CPT

## 2023-05-22 PROCEDURE — 80061 LIPID PANEL: CPT

## 2023-05-22 PROCEDURE — 36415 COLL VENOUS BLD VENIPUNCTURE: CPT

## 2023-05-22 PROCEDURE — 85025 COMPLETE CBC W/AUTO DIFF WBC: CPT

## 2023-05-22 PROCEDURE — G0103 PSA SCREENING: HCPCS

## 2023-05-24 RX ORDER — ALFUZOSIN HYDROCHLORIDE 10 MG/1
TABLET, EXTENDED RELEASE ORAL
Qty: 60 TABLET | Refills: 3 | Status: SHIPPED | OUTPATIENT
Start: 2023-05-24

## 2023-05-24 NOTE — TELEPHONE ENCOUNTER
Last Visit:  5/19/2023     Next Visit Date:  Future Appointments   Date Time Provider Romain Cynthia   7/10/2023  1:15 PM Abdelrahman Dukes MD [facility]   9/21/2023  8:00 AM TAYLOR Diamond - CNP [place] [facility]   11/21/2023  8:40 AM Mumtaz Kwok MD [address] [place] [address]

## 2023-07-07 NOTE — ED NOTES
Medicare Preventive Visit Patient Instructions  Thank you for completing your Welcome to Medicare Visit or Medicare Annual Wellness Visit today. Your next wellness visit will be due in one year (7/7/2024). The screening/preventive services that you may require over the next 5-10 years are detailed below. Some tests may not apply to you based off risk factors and/or age. Screening tests ordered at today's visit but not completed yet may show as past due. Also, please note that scanned in results may not display below. Preventive Screenings:  Service Recommendations Previous Testing/Comments   Colorectal Cancer Screening  * Colonoscopy    * Fecal Occult Blood Test (FOBT)/Fecal Immunochemical Test (FIT)  * Fecal DNA/Cologuard Test  * Flexible Sigmoidoscopy Age: 43-73 years old   Colonoscopy: every 10 years (may be performed more frequently if at higher risk)  OR  FOBT/FIT: every 1 year  OR  Cologuard: every 3 years  OR  Sigmoidoscopy: every 5 years  Screening may be recommended earlier than age 39 if at higher risk for colorectal cancer. Also, an individualized decision between you and your healthcare provider will decide whether screening between the ages of 77-80 would be appropriate. Colonoscopy: Not on file  FOBT/FIT: Not on file  Cologuard: Not on file  Sigmoidoscopy: Not on file    Risks and Benefits Discussed  Patient Declines     Breast Cancer Screening Age: 36 years old  Frequency: every 1-2 years  Not required if history of left and right mastectomy Mammogram: 11/13/2019    Risks and Benefits Discussed  Patient Declines   Cervical Cancer Screening Between the ages of 21-29, pap smear recommended once every 3 years. Between the ages of 32-69, can perform pap smear with HPV co-testing every 5 years.    Recommendations may differ for women with a history of total hysterectomy, cervical cancer, or abnormal pap smears in past. Pap Smear: Not on file    Risks and Benefits Discussed  Patient Declines The following labs labeled with pt sticker and tubed to lab:     [] Blue     [] Lavender   [] on ice  [x] Green/yellow  [] Green/black [] on ice  [] Yellow  [] Red  [] Pink      [] COVID-19 swab    [] Rapid  [] PCR  [] Flu Swab  [] Strep Swab  [] Peds Viral Panel     [] Urine Sample  [] Pelvic Cultures  [] Blood Cultures   [] Wound Cultures          Nikia Kearney RN  10/12/22 4918 Hepatitis C Screening Once for adults born between 1945 and 1965  More frequently in patients at high risk for Hepatitis C Hep C Antibody: Not on file    Patient Declines   Diabetes Screening 1-2 times per year if you're at risk for diabetes or have pre-diabetes Fasting glucose: 84 mg/dL (6/21/2023)  A1C: 5.9 (6/6/2019)  Screening Current   Cholesterol Screening Once every 5 years if you don't have a lipid disorder. May order more often based on risk factors. Lipid panel: 04/19/2021    Screening Current     Other Preventive Screenings Covered by Medicare:  1. Abdominal Aortic Aneurysm (AAA) Screening: covered once if your at risk. You're considered to be at risk if you have a family history of AAA. 2. Lung Cancer Screening: covers low dose CT scan once per year if you meet all of the following conditions: (1) Age 48-67; (2) No signs or symptoms of lung cancer; (3) Current smoker or have quit smoking within the last 15 years; (4) You have a tobacco smoking history of at least 20 pack years (packs per day multiplied by number of years you smoked); (5) You get a written order from a healthcare provider. 3. Glaucoma Screening: covered annually if you're considered high risk: (1) You have diabetes OR (2) Family history of glaucoma OR (3)  aged 48 and older OR (3)  American aged 72 and older  3. Osteoporosis Screening: covered every 2 years if you meet one of the following conditions: (1) You're estrogen deficient and at risk for osteoporosis based off medical history and other findings; (2) Have a vertebral abnormality; (3) On glucocorticoid therapy for more than 3 months; (4) Have primary hyperparathyroidism; (5) On osteoporosis medications and need to assess response to drug therapy. · Last bone density test (DXA Scan): Not on file. 5. HIV Screening: covered annually if you're between the age of 14-79.  Also covered annually if you are younger than 13 and older than 72 with risk factors for HIV infection. For pregnant patients, it is covered up to 3 times per pregnancy. Immunizations:  Immunization Recommendations   Influenza Vaccine Annual influenza vaccination during flu season is recommended for all persons aged >= 6 months who do not have contraindications   Pneumococcal Vaccine   * Pneumococcal conjugate vaccine = PCV13 (Prevnar 13), PCV15 (Vaxneuvance), PCV20 (Prevnar 20)  * Pneumococcal polysaccharide vaccine = PPSV23 (Pneumovax) Adults 20-63 years old: 1-3 doses may be recommended based on certain risk factors  Adults 72 years old: 1-2 doses may be recommended based off what pneumonia vaccine you previously received   Hepatitis B Vaccine 3 dose series if at intermediate or high risk (ex: diabetes, end stage renal disease, liver disease)   Tetanus (Td) Vaccine - COST NOT COVERED BY MEDICARE PART B Following completion of primary series, a booster dose should be given every 10 years to maintain immunity against tetanus. Td may also be given as tetanus wound prophylaxis. Tdap Vaccine - COST NOT COVERED BY MEDICARE PART B Recommended at least once for all adults. For pregnant patients, recommended with each pregnancy. Shingles Vaccine (Shingrix) - COST NOT COVERED BY MEDICARE PART B  2 shot series recommended in those aged 48 and above     Health Maintenance Due:      Topic Date Due   • Colorectal Cancer Screening  Never done   • Breast Cancer Screening: Mammogram  11/13/2020   • Cervical Cancer Screening  08/07/2023 (Originally 10/9/1979)   • Hepatitis C Screening  07/07/2024 (Originally 1958)   • HIV Screening  Completed     Immunizations Due:      Topic Date Due   • COVID-19 Vaccine (1) Never done   • Pneumococcal Vaccine: Pediatrics (0 to 5 Years) and At-Risk Patients (6 to 59 Years) (3 - PCV) 04/05/2017   • Influenza Vaccine (1) 09/01/2023     Advance Directives   What are advance directives?   Advance directives are legal documents that state your wishes and plans for medical care. These plans are made ahead of time in case you lose your ability to make decisions for yourself. Advance directives can apply to any medical decision, such as the treatments you want, and if you want to donate organs. What are the types of advance directives? There are many types of advance directives, and each state has rules about how to use them. You may choose a combination of any of the following:  · Living will: This is a written record of the treatment you want. You can also choose which treatments you do not want, which to limit, and which to stop at a certain time. This includes surgery, medicine, IV fluid, and tube feedings. · Durable power of  for healthcare [facility]): This is a written record that states who you want to make healthcare choices for you when you are unable to make them for yourself. This person, called a proxy, is usually a family member or a friend. You may choose more than 1 proxy. · Do not resuscitate (DNR) order:  A DNR order is used in case your heart stops beating or you stop breathing. It is a request not to have certain forms of treatment, such as CPR. A DNR order may be included in other types of advance directives. · Medical directive: This covers the care that you want if you are in a coma, near death, or unable to make decisions for yourself. You can list the treatments you want for each condition. Treatment may include pain medicine, surgery, blood transfusions, dialysis, IV or tube feedings, and a ventilator (breathing machine). · Values history: This document has questions about your views, beliefs, and how you feel and think about life. This information can help others choose the care that you would choose. Why are advance directives important? An advance directive helps you control your care. Although spoken wishes may be used, it is better to have your wishes written down. Spoken wishes can be misunderstood, or not followed.  Treatments may be given even if you do not want them. An advance directive may make it easier for your family to make difficult choices about your care. Cigarette Smoking and Your Health   Risks to your health if you smoke:  Nicotine and other chemicals found in tobacco damage every cell in your body. Even if you are a light smoker, you have an increased risk for cancer, heart disease, and lung disease. If you are pregnant or have diabetes, smoking increases your risk for complications. Benefits to your health if you stop smoking:   · You decrease respiratory symptoms such as coughing, wheezing, and shortness of breath. · You reduce your risk for cancers of the lung, mouth, throat, kidney, bladder, pancreas, stomach, and cervix. If you already have cancer, you increase the benefits of chemotherapy. You also reduce your risk for cancer returning or a second cancer from developing. · You reduce your risk for heart disease, blood clots, heart attack, and stroke. · You reduce your risk for lung infections, and diseases such as pneumonia, asthma, chronic bronchitis, and emphysema. · Your circulation improves. More oxygen can be delivered to your body. If you have diabetes, you lower your risk for complications, such as kidney, artery, and eye diseases. You also lower your risk for nerve damage. Nerve damage can lead to amputations, poor vision, and blindness. · You improve your body's ability to heal and to fight infections. For more information and support to stop smoking:   · [employer]. gov  Phone: 1- 968 - 949-6981  Web Address: www.[employer]  Weight Management   Why it is important to manage your weight:  Being overweight increases your risk of health conditions such as heart disease, high blood pressure, type 2 diabetes, and certain types of cancer. It can also increase your risk for osteoarthritis, sleep apnea, and other respiratory problems. Aim for a slow, steady weight loss.  Even a small amount of weight loss can lower your risk of health problems. How to lose weight safely:  A safe and healthy way to lose weight is to eat fewer calories and get regular exercise. You can lose up about 1 pound a week by decreasing the number of calories you eat by 500 calories each day. Healthy meal plan for weight management:  A healthy meal plan includes a variety of foods, contains fewer calories, and helps you stay healthy. A healthy meal plan includes the following:  · Eat whole-grain foods more often. A healthy meal plan should contain fiber. Fiber is the part of grains, fruits, and vegetables that is not broken down by your body. Whole-grain foods are healthy and provide extra fiber in your diet. Some examples of whole-grain foods are whole-wheat breads and pastas, oatmeal, brown rice, and bulgur. · Eat a variety of vegetables every day. Include dark, leafy greens such as spinach, kale, len greens, and mustard greens. Eat yellow and orange vegetables such as carrots, sweet potatoes, and winter squash. · Eat a variety of fruits every day. Choose fresh or canned fruit (canned in its own juice or light syrup) instead of juice. Fruit juice has very little or no fiber. · Eat low-fat dairy foods. Drink fat-free (skim) milk or 1% milk. Eat fat-free yogurt and low-fat cottage cheese. Try low-fat cheeses such as mozzarella and other reduced-fat cheeses. · Choose meat and other protein foods that are low in fat. Choose beans or other legumes such as split peas or lentils. Choose fish, skinless poultry (chicken or turkey), or lean cuts of red meat (beef or pork). Before you cook meat or poultry, cut off any visible fat. · Use less fat and oil. Try baking foods instead of frying them. Add less fat, such as margarine, sour cream, regular salad dressing and mayonnaise to foods. Eat fewer high-fat foods. Some examples of high-fat foods include french fries, doughnuts, ice cream, and cakes. · Eat fewer sweets.   Limit foods and drinks that are high in sugar. This includes candy, cookies, regular soda, and sweetened drinks. Exercise:  Exercise at least 30 minutes per day on most days of the week. Some examples of exercise include walking, biking, dancing, and swimming. You can also fit in more physical activity by taking the stairs instead of the elevator or parking farther away from stores. Ask your healthcare provider about the best exercise plan for you. © Copyright [address] 2018 Information is for End User's use only and may not be sold, redistributed or otherwise used for commercial purposes.  All illustrations and images included in CareNotes® are the copyrighted property of A.D.A.M., Inc. or  [place]

## 2023-07-10 ENCOUNTER — OFFICE VISIT (OUTPATIENT)
Dept: ORTHOPEDIC SURGERY | Age: 63
End: 2023-07-10

## 2023-07-10 VITALS — BODY MASS INDEX: 21.18 KG/M2 | RESPIRATION RATE: 14 BRPM | HEIGHT: 69 IN | WEIGHT: 143 LBS

## 2023-07-10 DIAGNOSIS — Z98.890 S/P ARTHROSCOPY OF SHOULDER: Primary | ICD-10-CM

## 2023-07-10 NOTE — PROGRESS NOTES
Procedure: Right shoulder arthroscopic superior capsular reconstruction and biceps tenodesis  Date of procedure: 1/12/2023    HPI:  Yulissa Kirk is a 61 y.o. male who is approximately 6 months status post aforementioned procedure. He is doing well overall. He states that he has kept up with his home exercise program and is using it for his regular daily activities. He reports having weakness with overhead activities. He still has some pain now and again in the shoulder that he rates as a 4/10. Physical examination:  Evaluation of patient's right shoulder and upper extremity demonstrates his incisions to be appropriately healed. There is no warmth, erythema, wound dehiscence or drainage. Sensation is grossly intact light touch in all dermatomes and he has a 2+ radial pulse with brisk capillary refill in his fingers. He has approximately 135 degrees of active shoulder elevation, 130 degrees of abduction and 35 degrees of external rotation. Passively he has 145 degrees of shoulder elevation 135 degrees of abduction and 45 degrees of external rotation. Impression and plan:  Yulissa Kirk is a 61 y.o. male who is approximately 6 months status post an arthroscopic right shoulder superior capsular reconstruction and biceps tenodesis. Overall still doing relatively well. He was encouraged at this time to keep up with his home exercise program and may continue on with regular daily activities within reason. I will see him back for reevaluation in 6 months but he was encouraged to return or call earlier with questions and/or concerns.

## 2023-09-13 ENCOUNTER — TELEPHONE (OUTPATIENT)
Dept: ONCOLOGY | Age: 63
End: 2023-09-13

## 2023-09-21 DIAGNOSIS — R39.12 WEAK URINARY STREAM: ICD-10-CM

## 2023-09-21 RX ORDER — ALFUZOSIN HYDROCHLORIDE 10 MG/1
10 TABLET, EXTENDED RELEASE ORAL 2 TIMES DAILY
Qty: 60 TABLET | Refills: 3 | Status: SHIPPED | OUTPATIENT
Start: 2023-09-21

## 2023-10-02 ENCOUNTER — HOSPITAL ENCOUNTER (OUTPATIENT)
Dept: PHYSICAL THERAPY | Age: 63
Setting detail: THERAPIES SERIES
Discharge: HOME | End: 2023-10-02
Payer: MEDICARE

## 2023-10-02 PROCEDURE — 97162 PT EVAL MOD COMPLEX 30 MIN: CPT

## 2023-10-02 NOTE — CONSULTS
Precautions:  Exercises:  Exercise Reps/ Time Weight/ Level Comments   Reviewed cervical ROM from supine                              Other:    Specific Instructions for next treatment: evaluation of LE/gait, add traction, add shoulder mobility and strengthening exercises    Treatment Charges: Mins Units   [x] Evaluation       []  Low       [x]  Moderate       []  High 45 1   []  Modalities     []  Ther Exercise     []  Manual Therapy     []  Ther Activities     []  Aquatics     []  Neuromuscular     []  Gait Training     []  Dry Needling           1-2 muscles     []  Dry Needling           3 or more muscles     [] Vasocompression     []  Other       TOTAL TREATMENT TIME: 45    Time in: 0900      Time out: 0945    Electronically signed by: Warner Tracy PT        Physician Signature:________________________________Date:__________________  By signing above or cosigning this note, I have reviewed this plan of care and certify a need for medically necessary rehabilitation services.      *PLEASE SIGN ABOVE AND FAX BACK ALL PAGES*

## 2023-10-05 ENCOUNTER — HOSPITAL ENCOUNTER (OUTPATIENT)
Dept: PHYSICAL THERAPY | Age: 63
Setting detail: THERAPIES SERIES
Discharge: HOME | End: 2023-10-05
Payer: MEDICARE

## 2023-10-05 PROCEDURE — 97112 NEUROMUSCULAR REEDUCATION: CPT

## 2023-10-05 PROCEDURE — 97110 THERAPEUTIC EXERCISES: CPT

## 2023-10-12 ENCOUNTER — HOSPITAL ENCOUNTER (OUTPATIENT)
Dept: PHYSICAL THERAPY | Age: 63
Setting detail: THERAPIES SERIES
Discharge: HOME | End: 2023-10-12
Payer: MEDICARE

## 2023-10-12 PROCEDURE — 97110 THERAPEUTIC EXERCISES: CPT

## 2023-10-12 PROCEDURE — 97140 MANUAL THERAPY 1/> REGIONS: CPT

## 2023-10-12 NOTE — PROGRESS NOTES
[x] [facility] ([facility]) - [facility] & Therapy  [address]  [place]: [phone]   [place]: [phone]    Physical Therapy Daily Treatment Note/Progress note for LE      Date:  10/12/2023  Patient Name:  Baldwin Burkitt    :  1960  MRN: [id number]  Physician: Elisabeth Bailey CNP                                   Insurance: [facility]/ [facility] secondary  EVAL, then auth through [address] [place] [address] Diagnosis: degenerative cervical spinal stenosis M48.02; chronic (R) shoulder pain M25.511; chronic (L) shoulder pain M25.512, cervical myelopathy G95.9; ADD low back pain M54.5                    Rehab Codes: neck pain M54.2, shoulder pain (B) M25.511, M25.512; ADD leg weakness (R) M62. 81  Onset Date: 23 referral                         Next 's appt.: PRN  Visit Count: 3/12                                Cancel/No Show: 0/0     Subjective:  Patient still noting complaints of severe pain in the (R) shoulder, especially from anterior shoulder down to the biceps. Notes that he feels like \"the cadaver is getting back at me\" and that the shoulder has \"not been right since I had surgery\"  Pain is severe when present but is intermittent and comes and goes essentially without warning per the patient. Intermittent neck symptoms and soreness, still having complaints of significant central back pain and complaints of the (R) LE weakness.     Pain:  [x] Yes  [] No   Location: HA, neck pain,; intermittent (B) shoulder and arm \"burning\"; (B) shoulder in lateral shoulder; low back with complaints of radicular weakness into the (B) LEs, (R) more than (L)            Pain Rating: (0-10 scale) 5-7/10  Pain altered Tx:  [] Yes  [] No  Action:       Objective:  Modalities:   Precautions:  Exercises:  Exercise Reps/ Time Weight/ Level Completed  Today Comments   Cervical rotation supine 5 x 5\"  X    Cervical sidebending supine 5 x 5\"  X    Cervical retraction supine 2 x 10  X           bridges 15x red X    Supine

## 2023-10-18 ENCOUNTER — HOSPITAL ENCOUNTER (OUTPATIENT)
Dept: PHYSICAL THERAPY | Age: 63
Setting detail: THERAPIES SERIES
Discharge: HOME | End: 2023-10-18
Payer: MEDICARE

## 2023-10-18 NOTE — PROGRESS NOTES
[x] [facility]) - [facility] & Therapy  [address]  [place]: [phone]   F: [phone]    Physical Therapy CXL/NS  Date:  10/18/2023  Patient Name:  Guillermina Shin                       :  1960                     MRN: [id number]  Physician: Abby Allred CNP                                   Insurance: [id number] [facility]/ [facility] secondary  EVAL, then auth through [address] [place] [address] Diagnosis: degenerative cervical spinal stenosis M48.02; chronic (R) shoulder pain M25.511; chronic (L) shoulder pain M25.512, cervical myelopathy G95.9; ADD low back pain M54.5                    Rehab Codes: neck pain M54.2, shoulder pain (B) M25.511, M25.512; ADD leg weakness (R) M62. 81  Onset Date: 23 referral                         Next 's appt.: PRN  Visit Count: 3/12                                Cancel/No Show: 0/1      Patient did not show for therapy appointment today. Unable to reach patient. On schedule for further appointments.       Electronically signed by:  Amanda Estrada, PT

## 2023-10-25 ENCOUNTER — HOSPITAL ENCOUNTER (OUTPATIENT)
Dept: PHYSICAL THERAPY | Age: 63
Setting detail: THERAPIES SERIES
Discharge: HOME | End: 2023-10-25
Payer: MEDICARE

## 2023-10-25 PROCEDURE — 97110 THERAPEUTIC EXERCISES: CPT

## 2023-10-25 PROCEDURE — 97140 MANUAL THERAPY 1/> REGIONS: CPT

## 2023-10-25 NOTE — PROGRESS NOTES
[x] [facility] ([facility]) - [facility] & Therapy  [address]  [place]: [phone]   F: [phone]    Physical Therapy Daily Treatment Note      Date:  10/25/2023  Patient Name:  Baldwin Burkitt    :  1960  MRN: [id number]  Physician: Elisabeth Bailey CNP                                   Insurance: [facility]/ [facility] secondary  EVAL, then auth through [address] [place] [address] Diagnosis: degenerative cervical spinal stenosis M48.02; chronic (R) shoulder pain M25.511; chronic (L) shoulder pain M25.512, cervical myelopathy G95.9; ADD low back pain M54.5                    Rehab Codes: neck pain M54.2, shoulder pain (B) M25.511, M25.512; ADD leg weakness (R) M62. 81  Onset Date: 23 referral                         Next 's appt.: PRN  Visit Count:                                 Cancel/No Show: 0/0     Subjective:  Patient still noting intermittent severe (R) shoulder pain (R) anterior shoulder radiating arm pain. Neck soreness continues/pain continues. Still noting significant complaints of central back pain depending on activity level, residual (R) LE weakness present for years.     Pain:  [x] Yes  [] No   Location: HA, neck pain,; intermittent (B) shoulder and arm \"burning\"; (B) shoulder in lateral shoulder; low back with complaints of radicular weakness into the (B) LEs, (R) more than (L)            Pain Rating: (0-10 scale) 5-7/10  Pain altered Tx:  [] Yes  [] No  Action:       Objective:  Modalities:   Precautions:  Exercises:  Exercise Reps/ Time Weight/ Level Completed  Today Comments   Cervical rotation supine 5 x 5\"  X    Cervical sidebending supine 5 x 5\"  X    Cervical retraction supine 2 x 10  X           bridges 15x red X    Supine hooklying clamshells 15x red X    Sidelying abduction 2 x 10 red X    Sidelying clamshells 15x red X    Resisted DF seated 10x ywl  HEP   Resisted eversion seated 10x ylw  HEP   Toe yoga 10x   HEP          Rows/extension 2 x 10  red X    Horizontal

## 2023-10-27 ENCOUNTER — HOSPITAL ENCOUNTER (OUTPATIENT)
Dept: PHYSICAL THERAPY | Age: 63
Setting detail: THERAPIES SERIES
Discharge: HOME | End: 2023-10-27
Payer: MEDICARE

## 2023-10-27 NOTE — PROGRESS NOTES
[x] [facility]) - [facility] Therapy  [address]  [place]: [phone]   F: [phone]    Physical Therapy CXL/NS      Date:  10/27/2023  Patient Name:  Maris Phillips    :  1960  MRN: [id number]  Physician: Vitaliy Carrasco CNP                                   Insurance: [id number]/ [facility] secondary  EVAL, then auth through [address] [place] [address] Diagnosis: degenerative cervical spinal stenosis M48.02; chronic (R) shoulder pain M25.511; chronic (L) shoulder pain M25.512, cervical myelopathy G95.9; ADD low back pain M54.5                    Rehab Codes: neck pain M54.2, shoulder pain (B) M25.511, M25.512; ADD leg weakness (R) M62. 81  Onset Date: 23 referral                         Next 's appt.: PRN  Visit Count:                                 Cancel/No Show: 1/0     Cancelled today- transportation issues- on schedule for next week.       Electronically signed by:  Daine Hatchet, PT

## 2023-10-31 ENCOUNTER — HOSPITAL ENCOUNTER (OUTPATIENT)
Dept: PHYSICAL THERAPY | Age: 63
Setting detail: THERAPIES SERIES
Discharge: HOME | End: 2023-10-31
Payer: MEDICARE

## 2023-10-31 PROCEDURE — 97110 THERAPEUTIC EXERCISES: CPT

## 2023-10-31 NOTE — FLOWSHEET NOTE
[x] [facility]) - [facility] & Therapy  [address]  [place]: [phone]   F: [phone]    Physical Therapy Daily Treatment Note      Date:  10/31/2023  Patient Name:  Yumi Schuler    :  1960  MRN: [id number]  Physician: Ariella Rausch CNP                                   Insurance: [facility]/ [facility] secondary  EVAL, then auth through [address] [place] [address] Diagnosis: degenerative cervical spinal stenosis M48.02; chronic (R) shoulder pain M25.511; chronic (L) shoulder pain M25.512, cervical myelopathy G95.9; ADD low back pain M54.5                    Rehab Codes: neck pain M54.2, shoulder pain (B) M25.511, M25.512; ADD leg weakness (R) M62. 81  Onset Date: 23 referral                         Next 's appt.: PRN  Visit Count:                                 Cancel/No Show: 0/0     Subjective:  Patient reporting less pain in the R arm this morning. Pt reporting his pain in the R shoulder was more yesterday with no reason. Pain:  [x] Yes  [] No   Location: HA, neck pain,; intermittent (B) shoulder and arm \"burning\"; (B) shoulder in lateral shoulder; low back with complaints of radicular weakness into the (B) LEs, (R) more than (L)              R shoulder- 6/10  L shoulder- 4/10  Back- 8/10  Neck- 8/10  Pain altered Tx:  [] Yes  [] No  Action:       Objective:  Modalities:   Precautions:  Exercises:  Exercise Reps/ Time Weight/ Level Completed  Today Comments   Manual 10mins  X Cervical traction, STM to cervical paraspinals, PROM UT stretching          Cervical rotation supine 5 x 5\"  X    Cervical sidebending supine 5 x 5\"  X    Cervical retraction supine 2 x 10  X    Supine protraction 2x10  X 10/31 added          bridges 10x2 3\" red X    Single knee fall out 10x2 red X    Sidelying abduction 2 x 10 red X 10/31 only able to tolerate 1 set on ea side.    Sidelying clamshells 15x red     Resisted DF seated 10x ywl  HEP   Resisted eversion seated 10x ylw  HEP   Toe yoga

## 2023-11-02 ENCOUNTER — HOSPITAL ENCOUNTER (OUTPATIENT)
Dept: PHYSICAL THERAPY | Age: 63
Setting detail: THERAPIES SERIES
Discharge: HOME | End: 2023-11-02
Payer: MEDICARE

## 2023-11-02 PROCEDURE — 97110 THERAPEUTIC EXERCISES: CPT

## 2023-11-02 NOTE — FLOWSHEET NOTE
lift/carry up to 10# with minimal pain with unilateral lifting to help with groceries, improved gripping/grasping by 90% self report, able to ambulate up to 15 minutes, able to do 8\" stairs in min UE (A) to help with tolerance of stairs at home. Independent with Home Exercise Programs  Demonstrate Knowledge of fall prevention        Patient goals:  hope to get less pain       Pt. Education:  [x] Yes  [] No  [x] Reviewed Prior HEP/Ed  Method of Education: [] Verbal  [] Demo  [x] Written  Comprehension of Education:  [x] Verbalizes understanding. [] Demonstrates understanding. [] Needs review. [] Demonstrates/verbalizes HEP/Ed previously given. Exercises:given at initial therapy session  Exercise   Reviewed cervical ROM from supine                     Given at second visit 10/5/23  bridges 15x    Supine hooklying clamshells 15x red   Hip abduction 10x    Sidelying clamshells 15x red   Resisted DF seated 10x ywl   Resisted eversion seated 10x ylw   Toe yoga 10x      Plan: [x] Continue per plan of care.    [] Other:      Treatment Charges: Mins Units   []  Modalities     [x]  Ther Exercise 25 2   [x]  Manual Therapy 10 -   []  Ther Activities     []  Aquatics     []  Neuromuscular     [] Vasocompression     [] Gait Training     [] Dry needling        [] 1 or 2 muscles        [] 3 or more muscles     []  Other     Total Treatment time 35 2     Time In:0800          Time Out: 2930    Electronically signed by:  Isac Alcantar PT

## 2023-11-07 ENCOUNTER — HOSPITAL ENCOUNTER (OUTPATIENT)
Dept: PHYSICAL THERAPY | Age: 63
Setting detail: THERAPIES SERIES
Discharge: HOME | End: 2023-11-07
Payer: MEDICARE

## 2023-11-07 PROCEDURE — 97110 THERAPEUTIC EXERCISES: CPT

## 2023-11-07 NOTE — FLOWSHEET NOTE
[x] [facility] ([facility]) - [facility] & Therapy  [address]  [place]: [phone]   F: [phone]    Physical Therapy Daily Treatment Note      Date:  2023  Patient Name:  Cele Trujillo    :  1960  MRN: [id number]  Physician: Britany James CNP                                   Insurance: [id number]/ [employer] secondary  EVAL, then auth through [address] [place] [address] Diagnosis: degenerative cervical spinal stenosis M48.02; chronic (R) shoulder pain M25.511; chronic (L) shoulder pain M25.512, cervical myelopathy G95.9; ADD low back pain M54.5                    Rehab Codes: neck pain M54.2, shoulder pain (B) M25.511, M25.512; ADD leg weakness (R) M62. 81  Onset Date: 23 referral                         Next 's appt.: PRN  Visit Count:                                 Cancel/No Show: 0/0     Subjective:  Patient reporting to therapy with his walking stick and states his pain is unchanged. Patient reporting that he has noticed little improvement with his RUE. Patient reporting his pain comes and goes and is inconsistent as far as what causes it. Patient also reporting he has not been consistent with HEP.     Pain:  [x] Yes  [] No   Location: HA, neck pain,; intermittent (B) shoulder and arm \"burning\"; (B) shoulder in lateral shoulder; low back with complaints of radicular weakness into the (B) LEs, (R) more than (L)              R shoulder- 6/10  L shoulder- 4/10  Back- 8/10  Neck- 8/10  Pain altered Tx:  [] Yes  [] No  Action:       Objective:  Modalities:   Precautions:  Exercises:  Exercise Reps/ Time Weight/ Level Completed  Today Comments   Manual 5mins  X Cervical traction, SOR,  PROM UT stretching (R) only          Cervical rotation supine 10 x 5\"  X    Cervical sidebending supine 5 x 5\"  X    Cervical retraction supine 2 x 10 5\" X    Supine protraction 2x10  X 10/ added   Side lying R shoulder ER   ADD Add next session for proper technique due to excessive compensation

## 2023-11-10 ENCOUNTER — HOSPITAL ENCOUNTER (OUTPATIENT)
Dept: PHYSICAL THERAPY | Age: 63
Setting detail: THERAPIES SERIES
Discharge: HOME | End: 2023-11-10
Payer: MEDICARE

## 2023-11-10 PROCEDURE — 97140 MANUAL THERAPY 1/> REGIONS: CPT

## 2023-11-10 PROCEDURE — 97110 THERAPEUTIC EXERCISES: CPT

## 2023-11-10 NOTE — FLOWSHEET NOTE
[x] [facility]) - [facility] & Therapy  [address]  [place]: [phone]   F: [phone]    Physical Therapy Daily Treatment Note      Date:  11/10/2023  Patient Name:  Belinda Cifuentes    :  1960  MRN: [id number]  Physician: Venessa Hallman CNP                                   Insurance: [employer] secondary  EVAL, then auth through [address] [place] [address] Diagnosis: degenerative cervical spinal stenosis M48.02; chronic (R) shoulder pain M25.511; chronic (L) shoulder pain M25.512, cervical myelopathy G95.9; ADD low back pain M54.5                    Rehab Codes: neck pain M54.2, shoulder pain (B) M25.511, M25.512; ADD leg weakness (R) M62. 81  Onset Date: 23 referral                         Next 's appt.: PRN  Visit Count:                                 Cancel/No Show: 0/0     Subjective:  Patient notes continued complaints of (R) anterior and lateral shoulder pain post surgically, notes complaints of (L) sided tightness in the arm especially with elevation and intermittent complaints of burning into the arm. Minimal complaints of the low back pain today- better tolerance of standing today.     Pain:  [x] Yes  [] No   Location: HA, neck pain,; intermittent (B) shoulder and arm \"burning\"; (B) shoulder in lateral shoulder; low back with complaints of radicular weakness into the (B) LEs, (R) more than (L)              R shoulder- 6/10  L shoulder- 4/10  Back- 4/10  Neck- 8/10  Pain altered Tx:  [] Yes  [] No  Action:       Objective:  Modalities:   Precautions:  Exercises:  Exercise Reps/ Time Weight/ Level Completed  Today Comments   Manual   X Cervical traction, SOR,  PROM UT stretching (R) only          Cervical rotation supine 10 x 5\"  X    Cervical sidebending supine 5 x 5\"  X    Cervical retraction supine 2 x 10 5\" X    Supine protraction 2x10  X 10/31 added   Side lying R shoulder ER   ADD    bridges 10x2 3\" red X    Single knee fall out 10x2 red X    Sidelying

## 2023-11-14 ENCOUNTER — HOSPITAL ENCOUNTER (OUTPATIENT)
Dept: PHYSICAL THERAPY | Age: 63
Setting detail: THERAPIES SERIES
Discharge: HOME | End: 2023-11-14
Payer: MEDICARE

## 2023-11-14 NOTE — FLOWSHEET NOTE
[x] [facility]) [facility] & Therapy  [address]  [place]: [phone]   F: [phone]     Physical Therapy Cancel/No Show note    Date: 2023  Patient: Huseyin Uribe  : 1960  MRN: [id number]    Visit Count:   Cancels/No Shows to date:     For today's appointment patient:    [x]  Cancelled    [] Rescheduled appointment    [] No-show     Reason given by patient:    []  Patient ill    []  Conflicting appointment    [x] No transportation      [] Conflict with work    [] No reason given    [] Weather related    [] [id number]    [] Other:      Comments:        [] Next appointment was confirmed    Electronically signed by: Thiago Lizama PTA

## 2023-11-15 ENCOUNTER — HOSPITAL ENCOUNTER (OUTPATIENT)
Dept: PAIN MANAGEMENT | Age: 63
Discharge: HOME | End: 2023-11-15
Payer: MEDICARE

## 2023-11-15 VITALS — BODY MASS INDEX: 20.29 KG/M2 | HEIGHT: 69 IN | WEIGHT: 137 LBS | TEMPERATURE: 97.3 F

## 2023-11-15 DIAGNOSIS — M54.6 CHRONIC BILATERAL THORACIC BACK PAIN: Primary | ICD-10-CM

## 2023-11-15 DIAGNOSIS — G89.29 CHRONIC BILATERAL THORACIC BACK PAIN: Primary | ICD-10-CM

## 2023-11-15 DIAGNOSIS — M96.1 POST LAMINECTOMY SYNDROME: ICD-10-CM

## 2023-11-15 DIAGNOSIS — Z79.891 CHRONIC USE OF OPIATE FOR THERAPEUTIC PURPOSE: ICD-10-CM

## 2023-11-15 DIAGNOSIS — M79.18 MYOFASCIAL PAIN SYNDROME: ICD-10-CM

## 2023-11-15 PROCEDURE — 99204 OFFICE O/P NEW MOD 45 MIN: CPT | Performed by: STUDENT IN AN ORGANIZED HEALTH CARE EDUCATION/TRAINING PROGRAM

## 2023-11-15 PROCEDURE — 99203 OFFICE O/P NEW LOW 30 MIN: CPT

## 2023-11-15 ASSESSMENT — PAIN SCALES - GENERAL: PAINLEVEL_OUTOF10: 7
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
## 2023-11-17 ENCOUNTER — HOSPITAL ENCOUNTER (OUTPATIENT)
Dept: PHYSICAL THERAPY | Age: 63
Setting detail: THERAPIES SERIES
Discharge: HOME | End: 2023-11-17
Payer: MEDICARE

## 2023-11-17 PROCEDURE — 97110 THERAPEUTIC EXERCISES: CPT

## 2023-11-17 PROCEDURE — 97140 MANUAL THERAPY 1/> REGIONS: CPT

## 2023-11-17 NOTE — FLOWSHEET NOTE
[x] [facility] ([facility]) - [facility] & Therapy  [address]  [place]: [phone]   F: [phone]    Physical Therapy Daily Treatment Note      Date:  2023  Patient Name:  Guillermina Shin    :  1960  MRN: [id number]  Physician: Abby Allred CNP                                   Insurance: [facility]/ [facility] secondary  EVAL, then auth through [address] [place] [address] Diagnosis: degenerative cervical spinal stenosis M48.02; chronic (R) shoulder pain M25.511; chronic (L) shoulder pain M25.512, cervical myelopathy G95.9; ADD low back pain M54.5                    Rehab Codes: neck pain M54.2, shoulder pain (B) M25.511, M25.512; ADD leg weakness (R) M62. 81  Onset Date: 23 referral                         Next 's appt.: PRN  Visit Count:                                 Cancel/No Show: 1/0     Subjective:  Patient continues to report cramping in the neck and pain down his back. Patient reporting limited cervical ROM. Patient reporting that if he holds objects in his R hand in a certain position it can be too heavy.     Pain:  [x] Yes  [] No   Location: HA, neck pain; intermittent (B) shoulder and arm \"burning\"; (B) shoulder in lateral shoulder; low back with complaints of radicular weakness into the (B) LEs, (R) more than (L)              R shoulder- 5/10  L shoulder- 3-4/10  Back- 3/10  Neck-4/10  Pain altered Tx:  [] Yes  [] No  Action:       Objective:  Modalities:   Precautions:  Exercises:  Exercise Reps/ Time Weight/ Level Completed  Today Comments   Manual 8min  X Cervical traction, SOR,  PROM UT stretching (R) only, ST M to R SCM          Cervical rotation supine 10 x 5\"  X    Cervical sidebending supine 5 x 5\"  X    Cervical retraction supine 2 x 10 5\" X    Supine protraction 2x10  X 10/31 added   Supine shoulder flexion with cane 10x  X    AAROM shoulder ER with Cane 15x 5\"  X  added after pt tried side lying ER AROM   Side lying R shoulder ER 10  X 11/17 added with

## 2023-11-20 ENCOUNTER — HOSPITAL ENCOUNTER (OUTPATIENT)
Dept: PHYSICAL THERAPY | Age: 63
Setting detail: THERAPIES SERIES
Discharge: HOME | End: 2023-11-20
Payer: MEDICARE

## 2023-11-20 PROCEDURE — 97110 THERAPEUTIC EXERCISES: CPT

## 2023-11-20 NOTE — FLOWSHEET NOTE
[x] [facility] ([facility]) - [facility] & Therapy  [address]  [place]: [phone]   F: [phone]    Physical Therapy Daily Treatment Note      Date:  2023  Patient Name:  Alek Winkler    :  1960  MRN: [id number]  Physician: Jonn Lincoln CNP                                   Insurance: [facility]/ [facility] secondary  EVAL, then auth through [address] [place] [address] Diagnosis: degenerative cervical spinal stenosis M48.02; chronic (R) shoulder pain M25.511; chronic (L) shoulder pain M25.512, cervical myelopathy G95.9; ADD low back pain M54.5                    Rehab Codes: neck pain M54.2, shoulder pain (B) M25.511, M25.512; ADD leg weakness (R) M62. 81  Onset Date: 23 referral                         Next 's appt.: PRN  Visit Count: 10/12                                Cancel/No Show:      Subjective:  Pt reporting to therapy with no new concerns or complaints. Patient reporting that his neck is his biggest concern coming to therapy this morning. Patient reporting that he as difficulty sleeping at night.       Pain:  [x] Yes  [] No   Location: HA, neck pain; intermittent (B) shoulder and arm \"burning\"; (B) shoulder in lateral shoulder; low back with complaints of radicular weakness into the (B) LEs, (R) more than (L)              R shoulder- 6/10  L shoulder- 4/10  Back- 2-3/10  Neck-7/10         Objective:  Modalities:   Precautions:  Exercises:  Exercise Reps/ Time Weight/ Level Completed  Today Comments   Manual 5min  X Cervical traction, SOR,  PROM UT stretching (R) only, ST M to R SCM          Cervical rotation supine 10 x 5\"      Cervical sidebending supine 5 x 5\"  X    Cervical retraction supine 2 x 10 5\" X    Upper trap stretch 3x 30\"  X  added with education for HEP   Levator stretch 3X 30\"  X  added with education for HEP   Cervical isometrics 10x 5\" ea  X  added with education for HEP; flexion, rotation, side bend, extension   Supine protraction 2x10

## 2023-11-22 ENCOUNTER — APPOINTMENT (OUTPATIENT)
Dept: PHYSICAL THERAPY | Age: 63
End: 2023-11-22
Payer: MEDICARE

## 2023-11-27 ASSESSMENT — ENCOUNTER SYMPTOMS
SORE THROAT: 0
DIARRHEA: 0
CHEST TIGHTNESS: 0
ABDOMINAL PAIN: 0
CONSTIPATION: 0
NAUSEA: 0
SHORTNESS OF BREATH: 0
ABDOMINAL DISTENTION: 0
RHINORRHEA: 0
VOMITING: 0

## 2023-11-27 NOTE — PROGRESS NOTES
therapy  - Stable: Medication re-filled as needed, con't medications as prescribed, con't current tx plan  - Will cont with low fat/chol diet and Crestor 10 mg daily as ordered. - Continue to decrease, fats, carbohydrates, and engage in a healthier diet overall, as well as routine exercise (40 minutes of aerobic exercise- moderate to vigorous intensity three to four times a week). 6. Chronic obstructive pulmonary disease, unspecified COPD type ([address])  - Stable: Medication re-filled as needed, con't medications as prescribed, con't current tx plan  - Continue with Spiriva daily as previously prescribed. - Continue with Albuterol PRN as previously prescribed. 7. PAD (peripheral artery disease) (HCC)  - Stable  - Not currently on ASA; did recommend starting 81 mg daily    8. Chronic low back pain with sciatica, sciatica laterality unspecified, unspecified back pain laterality  9. Chronic pain syndrome  10. DDD (degenerative disc disease), cervical  11. Primary osteoarthritis, unspecified site  12. Opioid dependence with current use [facility])  - Extensive discussion with patient regarding his management of chronic pain  - He has been to multiple pain clinics and per patient \"they wont give me Percocet. \" \"All they want to do to me is electrocute me and stick needles in me\"  - I did discuss with him the previous note from pain management with weaning instructions. I did discuss the importance of other options other than narcotics to manage his pain. He is not interested. He wants to find a provider that will \"give me my medications\"  - I did encourage him to wean off of the Percocet. I will give him a prescription today for Percocet. It does have very specific weaning instructions, which are as follows: 1 tablet TID x 10 days then decrease to BID x 10 days then daily x 10 days then stop.    - I did explain to him that I DO NOT manage chronic pain and that this will be the ONLY Percocet prescription that he will

## 2023-11-28 ENCOUNTER — OFFICE VISIT (OUTPATIENT)
Dept: FAMILY MEDICINE CLINIC | Age: 63
End: 2023-11-28
Payer: MEDICARE

## 2023-11-28 VITALS
HEART RATE: 69 BPM | SYSTOLIC BLOOD PRESSURE: 120 MMHG | WEIGHT: 140 LBS | DIASTOLIC BLOOD PRESSURE: 86 MMHG | OXYGEN SATURATION: 97 % | BODY MASS INDEX: 21.22 KG/M2 | TEMPERATURE: 98.1 F | HEIGHT: 68 IN

## 2023-11-28 DIAGNOSIS — Z00.00 PREVENTATIVE HEALTH CARE: ICD-10-CM

## 2023-11-28 DIAGNOSIS — R01.1 CARDIAC MURMUR: ICD-10-CM

## 2023-11-28 DIAGNOSIS — F11.20 OPIOID DEPENDENCE WITH CURRENT USE (HCC): ICD-10-CM

## 2023-11-28 DIAGNOSIS — M19.91 PRIMARY OSTEOARTHRITIS, UNSPECIFIED SITE: ICD-10-CM

## 2023-11-28 DIAGNOSIS — K21.9 GASTROESOPHAGEAL REFLUX DISEASE WITHOUT ESOPHAGITIS: ICD-10-CM

## 2023-11-28 DIAGNOSIS — F33.1 MODERATE EPISODE OF RECURRENT MAJOR DEPRESSIVE DISORDER (HCC): ICD-10-CM

## 2023-11-28 DIAGNOSIS — M54.40 CHRONIC LOW BACK PAIN WITH SCIATICA, SCIATICA LATERALITY UNSPECIFIED, UNSPECIFIED BACK PAIN LATERALITY: ICD-10-CM

## 2023-11-28 DIAGNOSIS — M50.30 DDD (DEGENERATIVE DISC DISEASE), CERVICAL: ICD-10-CM

## 2023-11-28 DIAGNOSIS — J44.9 CHRONIC OBSTRUCTIVE PULMONARY DISEASE, UNSPECIFIED COPD TYPE (HCC): ICD-10-CM

## 2023-11-28 DIAGNOSIS — E78.2 MIXED HYPERLIPIDEMIA: ICD-10-CM

## 2023-11-28 DIAGNOSIS — M47.814 THORACIC SPONDYLOSIS WITHOUT MYELOPATHY: ICD-10-CM

## 2023-11-28 DIAGNOSIS — I73.9 PAD (PERIPHERAL ARTERY DISEASE) (HCC): ICD-10-CM

## 2023-11-28 DIAGNOSIS — I10 ESSENTIAL HYPERTENSION: Primary | ICD-10-CM

## 2023-11-28 DIAGNOSIS — Z76.89 ENCOUNTER TO ESTABLISH CARE: ICD-10-CM

## 2023-11-28 DIAGNOSIS — G60.8 IDIOPATHIC SMALL FIBER SENSORY NEUROPATHY: ICD-10-CM

## 2023-11-28 DIAGNOSIS — G89.4 CHRONIC PAIN SYNDROME: ICD-10-CM

## 2023-11-28 DIAGNOSIS — G89.29 CHRONIC LOW BACK PAIN WITH SCIATICA, SCIATICA LATERALITY UNSPECIFIED, UNSPECIFIED BACK PAIN LATERALITY: ICD-10-CM

## 2023-11-28 DIAGNOSIS — Z79.899 ON STATIN THERAPY: ICD-10-CM

## 2023-11-28 PROCEDURE — 3079F DIAST BP 80-89 MM HG: CPT | Performed by: NURSE PRACTITIONER

## 2023-11-28 PROCEDURE — 3074F SYST BP LT 130 MM HG: CPT | Performed by: NURSE PRACTITIONER

## 2023-11-28 PROCEDURE — 99204 OFFICE O/P NEW MOD 45 MIN: CPT | Performed by: NURSE PRACTITIONER

## 2023-11-28 RX ORDER — OXYCODONE HYDROCHLORIDE AND ACETAMINOPHEN 5; 325 MG/1; MG/1
1 TABLET ORAL EVERY 4 HOURS PRN
COMMUNITY

## 2023-11-28 RX ORDER — OXYCODONE HYDROCHLORIDE AND ACETAMINOPHEN 5; 325 MG/1; MG/1
1 TABLET ORAL SEE ADMIN INSTRUCTIONS
Qty: 60 TABLET | Refills: 0 | Status: SHIPPED | OUTPATIENT
Start: 2023-11-28 | End: 2023-12-28

## 2023-11-28 ASSESSMENT — PATIENT HEALTH QUESTIONNAIRE - PHQ9
SUM OF ALL RESPONSES TO PHQ QUESTIONS 1-9: 0
SUM OF ALL RESPONSES TO PHQ QUESTIONS 1-9: 0
SUM OF ALL RESPONSES TO PHQ9 QUESTIONS 1 & 2: 0
SUM OF ALL RESPONSES TO PHQ QUESTIONS 1-9: 0
SUM OF ALL RESPONSES TO PHQ QUESTIONS 1-9: 0
2. FEELING DOWN, DEPRESSED OR HOPELESS: 0
1. LITTLE INTEREST OR PLEASURE IN DOING THINGS: 0

## 2023-11-28 ASSESSMENT — ENCOUNTER SYMPTOMS
BACK PAIN: 1
COUGH: 1

## 2023-11-30 ENCOUNTER — HOSPITAL ENCOUNTER (OUTPATIENT)
Dept: PHYSICAL THERAPY | Age: 63
Setting detail: THERAPIES SERIES
Discharge: HOME | End: 2023-11-30
Payer: MEDICARE

## 2023-11-30 NOTE — FLOWSHEET NOTE
[x] [facility]) - [facility] & Therapy  [address]  [place]: [phone]   F: [phone]    Physical Therapy Daily Treatment Note/CXL NS      Date:  2023  Patient Name:  Rina Art    :  1960  MRN: [id number]  Physician: Zak Vargas CNP                                   Insurance: [facility]  EVAL, then [address] Diagnosis: degenerative cervical spinal stenosis M48.02; chronic (R) shoulder pain M25.511; chronic (L) shoulder pain M25.512, cervical myelopathy G95.9; ADD low back pain M54.5                    Rehab Codes: neck pain M54.2, shoulder pain (B) M25.511, M25.512; ADD leg weakness (R) M62. 81  Onset Date: 23 referral                         Next 's appt.: PRN  Visit Count: 10/12                                Cancel/No Show: 2/2       Patient did not show for therapy session today- unable to reach patient.       Electronically signed by:  Marainne Lau PT

## 2023-12-08 ENCOUNTER — HOSPITAL ENCOUNTER (OUTPATIENT)
Dept: PHYSICAL THERAPY | Age: 63
Setting detail: THERAPIES SERIES
Discharge: HOME | End: 2023-12-08
Payer: MEDICARE

## 2023-12-08 PROCEDURE — 97110 THERAPEUTIC EXERCISES: CPT

## 2023-12-08 NOTE — FLOWSHEET NOTE
[x] [facility] ([facility]) - [facility] & Therapy  [address]  [place]: [phone]   F: [phone]    Physical Therapy Daily Treatment Note      Date:  2023  Patient Name:  Trini Moy    :  1960  MRN: [id number]  Physician: Heidi Jones CNP                                   Insurance: [facility]  EVAL, then auth through [address] [place] [address] Diagnosis: degenerative cervical spinal stenosis M48.02; chronic (R) shoulder pain M25.511; chronic (L) shoulder pain M25.512, cervical myelopathy G95.9; ADD low back pain M54.5                    Rehab Codes: neck pain M54.2, shoulder pain (B) M25.511, M25.512; ADD leg weakness (R) M62. 81  Onset Date: 23 referral                         Next 's appt.: PRN  Visit Count:                                 Cancel/No Show:      Subjective:  Pt notes the (R) shoulder has been \"not as bad\" recently. Still with significant back and (L) radicular/nerve pain.     Pain:  [x] Yes  [] No   Location: HA, neck pain; intermittent (B) shoulder and arm \"burning\"; (B) shoulder in lateral shoulder; low back with complaints of radicular weakness into the (B) LEs, (R) more than (L)              R shoulder- 4/10  L shoulder- 4/10  Back- 2-3/10  Neck-10      Objective:  Modalities:   Precautions:  Exercises:  Exercise Reps/ Time Weight/ Level Completed  Today Comments   Manual 10  X Cervical traction, SOR,  PROM UT stretching (R) only, ST M to R SCM          Cervical rotation supine 10 x 5\"      Cervical sidebending supine 5 x 5\"  X    Cervical retraction supine 2 x 10 5\" X    Upper trap stretch 3x 30\"  X  added with education for HEP   Levator stretch 3X 30\"  X  added with education for HEP   Cervical isometrics 10x 5\" ea  X  added with education for HEP; flexion, rotation, side bend, extension   Supine protraction 2x10  X 10/31 added   Supine shoulder flexion with cane 10x  X    AAROM shoulder ER with Cane 15x 5\"

## 2023-12-14 ENCOUNTER — HOSPITAL ENCOUNTER (OUTPATIENT)
Dept: PHYSICAL THERAPY | Age: 63
Setting detail: THERAPIES SERIES
Discharge: HOME | End: 2023-12-14
Payer: MEDICARE

## 2023-12-14 PROCEDURE — 97110 THERAPEUTIC EXERCISES: CPT

## 2023-12-14 PROCEDURE — 97112 NEUROMUSCULAR REEDUCATION: CPT
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
## 2024-01-16 ENCOUNTER — OFFICE VISIT (OUTPATIENT)
Dept: UROLOGY | Age: 64
End: 2024-01-16
Payer: MEDICAID

## 2024-01-16 VITALS
TEMPERATURE: 96.8 F | WEIGHT: 145.6 LBS | HEIGHT: 68 IN | DIASTOLIC BLOOD PRESSURE: 84 MMHG | HEART RATE: 78 BPM | SYSTOLIC BLOOD PRESSURE: 120 MMHG | BODY MASS INDEX: 22.07 KG/M2

## 2024-01-16 DIAGNOSIS — Z12.5 PROSTATE CANCER SCREENING: ICD-10-CM

## 2024-01-16 DIAGNOSIS — R39.15 URINARY URGENCY: ICD-10-CM

## 2024-01-16 DIAGNOSIS — R39.12 WEAK URINARY STREAM: Primary | ICD-10-CM

## 2024-01-16 PROCEDURE — 3079F DIAST BP 80-89 MM HG: CPT | Performed by: UROLOGY

## 2024-01-16 PROCEDURE — 99214 OFFICE O/P EST MOD 30 MIN: CPT | Performed by: UROLOGY

## 2024-01-16 PROCEDURE — 3074F SYST BP LT 130 MM HG: CPT | Performed by: UROLOGY

## 2024-01-16 RX ORDER — ALFUZOSIN HYDROCHLORIDE 10 MG/1
10 TABLET, EXTENDED RELEASE ORAL 2 TIMES DAILY
Qty: 180 TABLET | Refills: 3 | Status: SHIPPED | OUTPATIENT
Start: 2024-01-16

## 2024-01-16 ASSESSMENT — ENCOUNTER SYMPTOMS
EYE REDNESS: 0
ABDOMINAL PAIN: 0
DIARRHEA: 0
SHORTNESS OF BREATH: 0
NAUSEA: 0
RESPIRATORY NEGATIVE: 1
BACK PAIN: 0
EYE PAIN: 0
CONSTIPATION: 0
WHEEZING: 0
COUGH: 0
EYES NEGATIVE: 1
GASTROINTESTINAL NEGATIVE: 1
VOMITING: 0

## 2024-01-24 ENCOUNTER — OFFICE VISIT (OUTPATIENT)
Dept: ORTHOPEDIC SURGERY | Age: 64
End: 2024-01-24
Payer: MEDICAID

## 2024-01-24 VITALS — WEIGHT: 145 LBS | RESPIRATION RATE: 14 BRPM | BODY MASS INDEX: 21.98 KG/M2 | HEIGHT: 68 IN

## 2024-01-24 DIAGNOSIS — Z98.890 S/P ARTHROSCOPY OF SHOULDER: Primary | ICD-10-CM

## 2024-01-24 PROCEDURE — 99212 OFFICE O/P EST SF 10 MIN: CPT | Performed by: ORTHOPAEDIC SURGERY

## 2024-01-24 RX ORDER — DICLOFENAC SODIUM 75 MG/1
75 TABLET, DELAYED RELEASE ORAL 2 TIMES DAILY WITH MEALS
Qty: 28 TABLET | Refills: 0 | Status: SHIPPED | OUTPATIENT
Start: 2024-01-24

## 2024-01-26 NOTE — PROGRESS NOTES
Procedure: Right shoulder arthroscopic superior capsular reconstruction and biceps tenodesis  Date of procedure: 1/12/2023    HPI:  Ha Garcia is a 63 y.o. male who is approximately 1 year status post aforementioned procedure.  He states that he has been dealing with the pulmonary pain on a daily basis that shoots down his arm.  He denies having any fevers, chills, sweats or any constitutional symptoms.    Physical examination:  Evaluation of patient's right shoulder and upper extremity demonstrates his incisions to be appropriately healed.  There is no warmth, erythema, wound dehiscence or drainage.  Sensation is grossly intact light touch in all dermatomes and he has a 2+ radial pulse with brisk capillary refill in his fingers.  He has approximately 135 degrees of active shoulder elevation, 135 degrees of abduction 45 degrees of external rotation and internal rotation to T12.  He has 5/5 muscle strength with resisted deltoid and internal rotation testing and 3/5 muscle strength with resisted external rotation.      Impression and plan:  aH Garcia is a 63 y.o. male who is approximately 1 year status post an arthroscopic right shoulder superior capsular reconstruction and biceps tenodesis.  As noted above he has been dealing with a fair amount of pain.  He still doing quite well functionally.  I had a discussion with the patient today about possible causes or sources for his pain.  I recommended proceeding conservatively at this time with a course of therapy and a short course of prescription anti-inflammatory.  He was amenable to this and prescriptions for both were provided.  Should he fail this treatment I would recommend further evaluation with an MRI arthrogram of the shoulder.

## 2024-02-09 ENCOUNTER — HOSPITAL ENCOUNTER (OUTPATIENT)
Dept: PHYSICAL THERAPY | Age: 64
Setting detail: THERAPIES SERIES
Discharge: HOME | End: 2024-02-09
Payer: MEDICAID

## 2024-02-09 PROCEDURE — 97162 PT EVAL MOD COMPLEX 30 MIN: CPT

## 2024-02-09 NOTE — THERAPY EVALUATION
[x] [facility]   Outpatient Rehabilitation & Therapy  [address]  P: [phone]   F: [phone]     Physical Therapy Upper Extremity Evaluation    Date:  2024  Patient: Ha Garcia  : 1960  MRN: [id number]  Physician: Susan Turk MD    Insurance: Medicaid --  visits remain   Medical Diagnosis: Z98.890 (ICD-10-CM) - S/P arthroscopy of shoulder    Rehab Codes: R25.511 R shoulder pain , M25.60 stiffness, R53.1 weakness   Onset Date: 23- DOS    Next Dr.'s appt: 24 -Dr. Turk     Precautions: chronic pain, significant cardiac history, fall risk but from chronic deficits -- monitor closely     Subjective:   CC: R shoulder pain     Pt had  R shoulder arthoscopy 23 and has had continual issues since surgery. He has complicated medical history with multiple severe issues. He reports having to have R shoulder surgery due to RTC tears he got when carrying a TV. He had no immediate PT following, but did do PT Oct 2023-Dec 2023 which addressed the R shoulder along with multiple other issues. Currently he notes, he is dealing with pain at end ROM of flexion, horizontal adduction, and ER. Feels he there is weakness in the ER. He feels the pain limits his ability with lifting, carrying, and reaching. 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 He feels the pain is along the supraspinatus and into the upper arm. Feels the pain becomes more a burning pain with activity.       PMHx: [] Unremarkable [] Diabetes [] HTN  [] Pacemaker   [x] MI/Heart Problems (open heart surgery) [] Cancer [] Arthritis [x] Other: L sided weakness               [x] Refer to full medical chart  In EPIC   Past Medical History:   Diagnosis Date    Abdominal pain     Ascending aortic aneurysm (HCC) 12    resection/replacement 26mm Hemashield under CPB    Chronic pain     Lower back, knees,

## 2024-02-13 ENCOUNTER — HOSPITAL ENCOUNTER (OUTPATIENT)
Dept: PHYSICAL THERAPY | Age: 64
Setting detail: THERAPIES SERIES
Discharge: HOME | End: 2024-02-13
Payer: MEDICAID

## 2024-02-13 PROCEDURE — 97110 THERAPEUTIC EXERCISES: CPT

## 2024-02-13 NOTE — FLOWSHEET NOTE
[] [facility]   Outpatient Rehabilitation & Therapy  [address]  P: [phone]   F: [phone]    Physical Therapy Daily Treatment Note      Date:  2024  Patient Name:  Ha Garcia    :  1960  MRN: [id number]  Physician: Susan Turk MD                              Insurance: Medicaid --  visits remain   Medical Diagnosis: Z98.890 (ICD-10-CM) - S/P arthroscopy of shoulder    Rehab Codes: R25.511 R shoulder pain , M25.60 stiffness, R53.1 weakness   Onset Date: 23- DOS                Next Dr.'s appt: 24 -Dr. Turk   Visit# / total visits:   Cancels/No Shows: 0/0    Precautions: chronic pain, significant cardiac history, fall risk but from chronic deficits -- monitor closely      Subjective:   Patient arriving 15mins late to session stating he thought his appt was at 8AM.  Therapist was able to accommodate this date as the following pt did cancel.  Pt reporting that the weakness is more bothersome for him.      Pain:  [x] Yes  [] No Location: R shoulder Pain Rating: (0-10 scale) 6/10  Pain altered Tx:  [] No  [] Yes  Action:  Comments:    Objective:  Modalities:   Precautions:  Exercises:  INTERVENTIONS  Reps/ Time Weight/ Level Completed  Today Comments               MODALITIES                                    MANUAL             grade I-II PA mob 5mins    x Added to alleviate pain/discomfort               EXERCISES             Pulley- flexion ABD 10x 5\"      x                  Supine            AAROM shoulder flexion with TAMARA 10x2 5\" 4# x    Chest press TAMARA 10x2 4# x    Protraction TAMARA 10x2 4# x Pain radiating down BUEs- burning          Seated       Scapular retraction 10x2 5\" x    Wrist flexor stretch R arm only 3x 30\"  x Pain/discomfort noted    Wrist extensor stretch R arm only 3x 30\"  x Pain/discomfort noted           Standing       Doorway stretch 3x 30\"  x Pain/discomfort noted              Patient Education/Home Program:   - educated on fall

## 2024-02-15 ENCOUNTER — HOSPITAL ENCOUNTER (OUTPATIENT)
Dept: PHYSICAL THERAPY | Age: 64
Setting detail: THERAPIES SERIES
Discharge: HOME | End: 2024-02-15
Payer: MEDICAID

## 2024-02-15 NOTE — FLOWSHEET NOTE
[] [facility]   Outpatient Rehabilitation & Therapy  [address]  P: [phone]   F: [phone]     Physical Therapy Cancel/No Show note    Date: 2/15/2024  Patient: Ha Garcia  : 1960  MRN: [id number]    Visit Count:   Cancels/No Shows to date:     For today's appointment patient:    [x]  Cancelled    [] Rescheduled appointment    [] No-show     Reason given by patient:    []  Patient ill    []  Conflicting appointment    [] No transportation      [] Conflict with work    [x] No reason given- \"can't make it in today\"    [] Weather related    [] COVID-19    [] Other:      Comments:        [] Next appointment was confirmed    Electronically signed by: Chacha Herron PTA

## 2024-02-19 ENCOUNTER — HOSPITAL ENCOUNTER (OUTPATIENT)
Dept: PHYSICAL THERAPY | Age: 64
Setting detail: THERAPIES SERIES
End: 2024-02-19
Payer: MEDICARE

## 2024-02-22 ENCOUNTER — HOSPITAL ENCOUNTER (OUTPATIENT)
Dept: PHYSICAL THERAPY | Age: 64
Setting detail: THERAPIES SERIES
Discharge: HOME | End: 2024-02-22
Payer: MEDICAID

## 2024-02-22 PROCEDURE — 97110 THERAPEUTIC EXERCISES: CPT

## 2024-02-22 NOTE — FLOWSHEET NOTE
shoulder ER to > 45 deg to better be able to reach behind his head.   LTG: (to be met in 12 treatments)  Pt will demonstrate improved R UE strength to 5/5  or greater of all major muscle groups in order to demonstrate improved stability/strength necessary for unrestricted ADLs/work activities  Pt will self report he can mix his cookie dough with no increase in R shoulder pain to be able to return to an activity he enjoys.   Pt will decrease score on SPADI to <80/130 in order to demonstrate improved functional tolerances at PLOF with minimal restriction/dysfunction  Pt will demonstrate independence with a long term HEP for continued progress/maintenance after completion of PT  Pt will report no falls for x6 weeks demonstrating improved safety with mobility and implementation of fall prevention strategies.      Pt. Education:  [] Yes  [x] No  [] Reviewed Prior HEP/Ed  Method of Education: [] Verbal  [] Demo  [] Written  Comprehension of Education:  [] Verbalizes understanding.  [] Demonstrates understanding.  [] Needs review.  [] Demonstrates/verbalizes HEP/Ed previously given.     Plan: [x] Continue per plan of care.   [] Other:      Treatment Charges: Mins Units   []  Modalities     [x]  Ther Exercise 39 3   [x]  Manual Therapy 5 -   []  Ther Activities     []  Aquatics     []  Neuromuscular     [] Vasocompression     [] Gait Training     [] Dry needling        [] 1 or 2 muscles        [] 3 or more muscles     []  Other     Total Billable time 44 3     Time In:0730            Time Out: 0814    Electronically signed by:  Chacha Herron PTA

## 2024-02-27 ENCOUNTER — HOSPITAL ENCOUNTER (OUTPATIENT)
Dept: PHYSICAL THERAPY | Age: 64
Setting detail: THERAPIES SERIES
Discharge: HOME | End: 2024-02-27
Payer: MEDICARE

## 2024-02-27 PROCEDURE — 97110 THERAPEUTIC EXERCISES: CPT

## 2024-02-27 NOTE — FLOWSHEET NOTE
complete his ADLs with less pain and dysfunction.      STG: (to be met in 6 treatments)  Pt will self report worst pain no greater than 6/10 in the R shoulder with movement  in order to better tolerate ADLs/work activities with minimal dysfunction  Pt will improve AROM in R shoulder ER to > 45 deg to better be able to reach behind his head.   LTG: (to be met in 12 treatments)  Pt will demonstrate improved R UE strength to 5/5  or greater of all major muscle groups in order to demonstrate improved stability/strength necessary for unrestricted ADLs/work activities  Pt will self report he can mix his cookie dough with no increase in R shoulder pain to be able to return to an activity he enjoys.   Pt will decrease score on SPADI to <80/130 in order to demonstrate improved functional tolerances at PLOF with minimal restriction/dysfunction  Pt will demonstrate independence with a long term HEP for continued progress/maintenance after completion of PT  Pt will report no falls for x6 weeks demonstrating improved safety with mobility and implementation of fall prevention strategies.      Pt. Education:  [] Yes  [x] No  [] Reviewed Prior HEP/Ed  Method of Education: [] Verbal  [] Demo  [] Written  Comprehension of Education:  [] Verbalizes understanding.  [] Demonstrates understanding.  [] Needs review.  [] Demonstrates/verbalizes HEP/Ed previously given.     Plan: [x] Continue per plan of care.   [] Other:      Treatment Charges: Mins Units   []  Modalities     [x]  Ther Exercise 40 3   []  Manual Therapy     []  Ther Activities     []  Aquatics     []  Neuromuscular     [] Vasocompression     [] Gait Training     [] Dry needling        [] 1 or 2 muscles        [] 3 or more muscles     []  Other     Total Billable time 40 3     Time In:0730            Time Out:  0810    Electronically signed by:  Pierre Leone PT

## 2024-02-29 ENCOUNTER — HOSPITAL ENCOUNTER (OUTPATIENT)
Dept: PHYSICAL THERAPY | Age: 64
Setting detail: THERAPIES SERIES
Discharge: HOME | End: 2024-02-29
Payer: MEDICARE

## 2024-02-29 PROCEDURE — 97110 THERAPEUTIC EXERCISES: CPT

## 2024-02-29 NOTE — FLOWSHEET NOTE
[x] [facility]   Outpatient Rehabilitation & Therapy  [address]  P: [phone]   F: [phone]    Physical Therapy Daily Treatment Note      Date:  2024  Patient Name:  Ha Garcia    :  1960  MRN: [id number]  Physician: Susan Turk MD                              Insurance: Medicaid --  visits remain   Medical Diagnosis: Z98.890 (ICD-10-CM) - S/P arthroscopy of shoulder    Rehab Codes: R25.511 R shoulder pain , M25.60 stiffness, R53.1 weakness   Onset Date: 23- DOS                Next Dr.'s appt: 24 -Dr. Turk   Visit# / total visits:   Cancels/No Shows: 10    Precautions: chronic pain, significant cardiac history, fall risk but from chronic deficits -- monitor closely      Subjective:   Patient reporting to therapy with no new concerns or complaints.  Patient reporting he has not been \"pushing\" activity too much this morning.  Patient overall reporting improved pain symptoms since beginning therapy.    Pain:  [x] Yes  [] No Location: R shoulder Pain Rating: (0-10 scale) 5/10  Pain altered Tx:  [] No  [] Yes  Action:  Comments:    Objective:  Modalities:   Precautions:  Exercises:  INTERVENTIONS  Reps/ Time Weight/ Level Completed  Today Comments               MODALITIES                                    MANUAL             grade I-II PA mob 5mins     Added to alleviate pain/discomfort    scapular mobilization  2mins    x     Subscapularis release 2mins  x    EXERCISES             Pulley- flexion ABD 20x 5\"      x                  Supine            PROM ER to improve shoulder ROM 15x 5\"    added   AAROM shoulder flexion with TAMARA 10x2 5\" 4#  Tightness noted throughout chest and R shoulder.   AAROM shoulder ER with cane 10x2 5\"    added to improve ER.   Chest press TAMARA 10x2 4#     Sidelying abd  10x 2# x Guiding on the lowering phase   scapular depression also added to decrease \"popping\" sensation   Sidelying ER 10x2 2# x Towel under the

## 2024-03-06 ENCOUNTER — HOSPITAL ENCOUNTER (OUTPATIENT)
Dept: PHYSICAL THERAPY | Age: 64
Setting detail: THERAPIES SERIES
Discharge: HOME | End: 2024-03-06
Payer: MEDICARE

## 2024-03-06 NOTE — FLOWSHEET NOTE
[x] [facility]   Outpatient Rehabilitation & Therapy  [address]  P: [phone]   F: [phone]     Physical Therapy Cancel/No Show note    Date: 3/6/2024  Patient: Ha Garcia  : 1960  MRN: [id number]    Visit Count:   Cancels/No Shows to date:     For today's appointment patient:    [x]  Cancelled    [] Rescheduled appointment    [] No-show     Reason given by patient:    []  Patient ill    []  Conflicting appointment    [] No transportation      [] Conflict with work    [] No reason given    [] Weather related    [] COVID-19    [x] Other:      Comments:  Patient had arrived an hour and 15 mins early to his appt.  He had left the clinic and had gone to the store.  But when he was wanting to come back from the store, his truck wouldn't start and pt reports he forgot to call and cancel his appt.        [x] Next appointment was confirmed Friday 3/8 at 8am     Electronically signed by: Chacha Herron PTA

## 2024-03-08 ENCOUNTER — HOSPITAL ENCOUNTER (OUTPATIENT)
Dept: PHYSICAL THERAPY | Age: 64
Setting detail: THERAPIES SERIES
Discharge: HOME | End: 2024-03-08
Payer: MEDICARE

## 2024-03-08 PROCEDURE — 97110 THERAPEUTIC EXERCISES: CPT

## 2024-03-08 NOTE — PROGRESS NOTES
3/8: progressing - still painful   Pt will decrease score on SPADI to <80/130 in order to demonstrate improved functional tolerances at PLOF with minimal restriction/dysfunction   3/8; progressing, score improves 9 pt to 94/130   Pt will demonstrate independence with a long term HEP for continued progress/maintenance after completion of PT   3/8: continuing to progress  Pt will report no falls for x6 weeks demonstrating improved safety with mobility and implementation of fall prevention strategies.    3/8: meeting, no falls noted     Pt. Education:  [x] Yes  [] No  [x] Reviewed Prior HEP/Ed  Method of Education: [] Verbal  [] Demo  [] Written  Comprehension of Education:  [] Verbalizes understanding.  [] Demonstrates understanding.  [] Needs review.  [x] Demonstrates/verbalizes HEP/Ed previously given.     Plan: [x] Continue per plan of care.    [] Other:        Treatment Plan:  [x] Therapeutic Exercise   83500  [] Iontophoresis: 4 mg/mL Dexamethasone Sodium Phosphate  mAmin  17011   [x] Therapeutic Activity  73120 [x] Vasopneumatic cold with compression  74621    [] Gait Training   71735 [] Ultrasound   40860   [x] Neuromuscular Re-education  11028 [] Electrical Stimulation Unattended  31040   [x] Manual Therapy  08577 [] Electrical Stimulation Attended  74224   [x] Instruction in HEP  [] Lumbar/Cervical Traction  48585   [] Aquatic Therapy   22274 [] Cold/hotpack    [] Massage   77499      [] Dry Needling, 1 or 2 muscles  55486   [] Biofeedback, first 15 minutes   23315  [] Biofeedback, additional 15 minutes   55217 [] Dry Needling, 3 or more muscles  83040      Patient Status:     [x] Continue per initial plan of care.    [] Additional visits necessary.    [] Other:     Requested Frequency/Duration: 2 times per week for 12 treatments.             Treatment Charges: Mins Units   []  Modalities     [x]  Ther Exercise 38 3   []  Manual Therapy     []  Ther Activities     []  Aquatics     []  Neuromuscular

## 2024-03-11 ENCOUNTER — HOSPITAL ENCOUNTER (OUTPATIENT)
Dept: PHYSICAL THERAPY | Age: 64
Setting detail: THERAPIES SERIES
Discharge: HOME | End: 2024-03-11
Payer: MEDICARE

## 2024-03-11 NOTE — FLOWSHEET NOTE
[] [facility]   Outpatient Rehabilitation & Therapy  [address]  P: [phone]   F: [phone]     Physical Therapy Cancel/No Show note    Date: 3/11/2024  Patient: Ha Garcia  : 1960  MRN: [id number]    Visit Count:   Cancels/No Shows to date:     For today's appointment patient:    [x]  Cancelled    [] Rescheduled appointment    [] No-show     Reason given by patient:    []  Patient ill    []  Conflicting appointment    [x] No transportation      [] Conflict with work    [] No reason given    [] Weather related    [] COVID-19    [x] Other:      Comments:  Car issues      [x] Next appointment was confirmed    Electronically signed by: Shiv Yun PTA

## 2024-03-13 ENCOUNTER — HOSPITAL ENCOUNTER (OUTPATIENT)
Dept: PHYSICAL THERAPY | Age: 64
Setting detail: THERAPIES SERIES
Discharge: HOME | End: 2024-03-13
Payer: MEDICARE

## 2024-03-13 NOTE — FLOWSHEET NOTE
[x] [facility]   Outpatient Rehabilitation & Therapy  [address]  P: [phone]   F: [phone]     Physical Therapy Cancel/No Show note    Date: 3/13/2024  Patient: Ha Garcia  : 1960  MRN: [id number]    Visit Count:   Cancels/No Shows to date: 3/0    For today's appointment patient:    [x]  Cancelled    [] Rescheduled appointment    [] No-show     Reason given by patient:    []  Patient ill    []  Conflicting appointment    [x] No transportation- per , pt called to cx due to having car issues.     [] Conflict with work    [] No reason given    [] Weather related    [] COVID-19    [] Other:      Comments:        [x] Next appointment was confirmed    Electronically signed by: Chacha Herron PTA

## 2024-03-18 ENCOUNTER — HOSPITAL ENCOUNTER (OUTPATIENT)
Dept: PHYSICAL THERAPY | Age: 64
Setting detail: THERAPIES SERIES
Discharge: HOME | End: 2024-03-18
Payer: MEDICARE

## 2024-03-18 PROCEDURE — 97110 THERAPEUTIC EXERCISES: CPT

## 2024-03-18 NOTE — FLOWSHEET NOTE
therapist guiding, tactile cues to scap   Serratus punch - R only  10x2 3# x    90/90 circles: CW/CCW- R  10x2 3# x           Seated       Rows  15x2 red  Cues for better scap retraction vs elevation    Horizontal ABD  15x2 Red  x    B ER pull apart  10x2 Red  x Difficulty stabilizing arm at side   3/18 inc sets today- cues needed for scapular activation   Extensions  15x2  Red      Wrist flexor stretch R arm only 3x 30\"   Pain/discomfort noted    Wrist extensor stretch R arm only 3x 30\"   Pain/discomfort noted    Bicep curls neutral 10x2   2/22 added to try to alleviate radicular pain in LUE   Standing       Pec stretch 3x 30\"   In corner, had to lower arm height for comfort.    Ball on wall: U/D, S/S, Circles  20x ea  x    Wall push ups  15x  x       Patient Education/Home Program:   - educated on fall risk. Discussed compliance and activity modification needed to have improvement with PT.   2/27: Educated about respecting pain and modifying activity if getting increase in pain.        Specific Instructions for next treatment:   - work R shoulder strengthening -- OKC and weight bearing   - stretch muscles that influence the R shoulder   - vaso if needed       Assessment: [x] Progressing toward goals.   3/18-  Continued with shoulder ROM and strengthening with scapular stabilization this date.  Continued with subscapularis release and scapular mobilizations to improve scapular mobility to improve tolerance to shoulder mobility.  The patient continues to require assistance at the scapula to improve shoulder ABD in side lying.  Therapist also provided AAROM with side lying ER due medial delt compensation noted with exercise.  Pt continues to noted soreness and fatigue at the end of session.  The pt does report that the assistance with side lying shoulder ABD did improve his tolerance and ROM.        [] No change.     [] Other:        [x] Patient would continue to benefit from skilled physical therapy services in

## 2024-03-21 ENCOUNTER — HOSPITAL ENCOUNTER (OUTPATIENT)
Dept: PHYSICAL THERAPY | Age: 64
Setting detail: THERAPIES SERIES
Discharge: HOME | End: 2024-03-21
Payer: MEDICARE

## 2024-03-21 PROCEDURE — 97110 THERAPEUTIC EXERCISES: CPT

## 2024-03-21 PROCEDURE — 97140 MANUAL THERAPY 1/> REGIONS: CPT

## 2024-03-21 NOTE — FLOWSHEET NOTE
[x] [facility]   Outpatient Rehabilitation & Therapy  [address]  P: [phone]   F: [phone]    Physical Therapy Daily Treatment Note      Date:  3/21/2024  Patient Name:  Ha Garcia    :  1960  MRN: [id number]  Physician: Susan Turk MD                              Insurance: Medicaid --  visits remain   Medical Diagnosis: Z98.890 (ICD-10-CM) - S/P arthroscopy of shoulder    Rehab Codes: R25.511 R shoulder pain , M25.60 stiffness, R53.1 weakness   Onset Date: 23- DOS                Next Dr.'s appt: 24 -Dr. Turk   Visit# / total visits:   Cancels/No Shows: 4/0      Precautions: chronic pain, significant cardiac history, fall risk but from chronic deficits -- monitor closely      Subjective: Patient reporting to therapy with walking stick stating he began to experience severe neck pain since last night.  Patient reporting he was just sitting in his chair with the onset of pain.      Pain:  [x] Yes  [] No   R UE 10  Neck 8/10  Pain altered Tx:  [] No  [] Yes  Action:  Comments:    Objective:    Exercises:  INTERVENTIONS  Reps/ Time Weight/ Level Completed  Today Comments               MODALITIES                                    MANUAL             grade I-II PA mob 5mins     Added to alleviate pain/discomfort    scapular mobilization  5mins     inferior/sup, ML   PROM UT 7 mins  x    Cervical traction 5 mins  x    STM to R SCM 3 mins  x    Subscapularis release 3mins      EXERCISES             Pulley- flexion ABD 20x 5\"      x                  Supine -RUE           PROM ER to improve shoulder ROM 15x 5\"    added   AROM shoulder flexion with DB 10x2 5\" 3# x 3/21 AROM   AAROM shoulder ER with cane 10x2 5\"    added to improve ER.   Chest press TAMARA 10x2 4#     Sidelying abd  10x2 3#  Guiding on the lowering phase, cueing for scap retraction to reduce crepitus    Sidelying ER 10x2 1#  Towel under the arm, therapist guiding, tactile cues to scap

## 2024-03-26 ENCOUNTER — APPOINTMENT (OUTPATIENT)
Dept: PHYSICAL THERAPY | Age: 64
End: 2024-03-26
Payer: MEDICARE

## 2024-03-27 ENCOUNTER — HOSPITAL ENCOUNTER (OUTPATIENT)
Dept: PHYSICAL THERAPY | Age: 64
Setting detail: THERAPIES SERIES
Discharge: HOME | End: 2024-03-27
Payer: MEDICARE

## 2024-03-27 PROBLEM — G95.9 CERVICAL MYELOPATHY (HCC): Status: ACTIVE | Noted: 2024-03-27

## 2024-03-27 PROCEDURE — 97110 THERAPEUTIC EXERCISES: CPT

## 2024-03-27 NOTE — FLOWSHEET NOTE
of PT   3/8: continuing to progress  Pt will report no falls for x6 weeks demonstrating improved safety with mobility and implementation of fall prevention strategies.    3/8: meeting, no falls noted     Pt. Education:  [x] Yes  [] No  [x] Reviewed Prior HEP/Ed  Method of Education: [] Verbal  [] Demo  [] Written  Comprehension of Education:  [] Verbalizes understanding.  [] Demonstrates understanding.  [] Needs review.  [x] Demonstrates/verbalizes HEP/Ed previously given.     Plan: [x] Continue per plan of care.    [] Other:             Treatment Charges: Mins Units   []  Modalities     [x]  Ther Exercise 40 3   []  Manual Therapy     []  Ther Activities     []  Aquatics     []  Neuromuscular     [] Vasocompression     [] Gait Training     [] Dry needling        [] 1 or 2 muscles        [] 3 or more muscles     []  Other     Total Billable time 40 3     Time In: 1030        Time Out:  1110    Electronically signed by:  Chacha Herron PTA

## 2024-03-28 ENCOUNTER — HOSPITAL ENCOUNTER (OUTPATIENT)
Age: 64
Setting detail: SPECIMEN
Discharge: HOME | End: 2024-03-28
Payer: MEDICARE

## 2024-03-28 DIAGNOSIS — Z79.891 CHRONIC PRESCRIPTION OPIATE USE: ICD-10-CM

## 2024-03-28 PROCEDURE — G0481 DRUG TEST DEF 8-14 CLASSES: HCPCS

## 2024-03-28 PROCEDURE — 80307 DRUG TEST PRSMV CHEM ANLYZR: CPT

## 2024-03-29 ENCOUNTER — HOSPITAL ENCOUNTER (OUTPATIENT)
Dept: PHYSICAL THERAPY | Age: 64
Setting detail: THERAPIES SERIES
Discharge: HOME | End: 2024-03-29
Payer: MEDICARE

## 2024-03-29 ENCOUNTER — HOSPITAL ENCOUNTER (OUTPATIENT)
Age: 64
Setting detail: SPECIMEN
Discharge: HOME | End: 2024-03-29
Payer: MEDICARE

## 2024-03-29 DIAGNOSIS — I10 ESSENTIAL HYPERTENSION: ICD-10-CM

## 2024-03-29 DIAGNOSIS — E55.9 VITAMIN D DEFICIENCY: ICD-10-CM

## 2024-03-29 DIAGNOSIS — E78.2 MIXED HYPERLIPIDEMIA: ICD-10-CM

## 2024-03-29 LAB
25(OH)D3 SERPL-MCNC: 21.9 NG/ML (ref 30–100)
ALBUMIN SERPL-MCNC: 4.3 G/DL (ref 3.5–5.2)
ALBUMIN/GLOB SERPL: 2 {RATIO} (ref 1–2.5)
ALP SERPL-CCNC: 68 U/L (ref 40–129)
ALT SERPL-CCNC: 7 U/L (ref 10–50)
ANION GAP SERPL CALCULATED.3IONS-SCNC: 7 MMOL/L (ref 9–16)
AST SERPL-CCNC: 20 U/L (ref 10–50)
BASOPHILS # BLD: 0.09 K/UL (ref 0–0.2)
BASOPHILS NFR BLD: 1 % (ref 0–2)
BILIRUB SERPL-MCNC: 0.4 MG/DL (ref 0–1.2)
BUN SERPL-MCNC: 17 MG/DL (ref 8–23)
CALCIUM SERPL-MCNC: 8.9 MG/DL (ref 8.6–10.4)
CHLORIDE SERPL-SCNC: 104 MMOL/L (ref 98–107)
CHOLEST SERPL-MCNC: 183 MG/DL (ref 0–199)
CHOLESTEROL/HDL RATIO: 5
CO2 SERPL-SCNC: 29 MMOL/L (ref 20–31)
CREAT SERPL-MCNC: 0.9 MG/DL (ref 0.7–1.2)
EOSINOPHIL # BLD: 0.27 K/UL (ref 0–0.44)
EOSINOPHILS RELATIVE PERCENT: 4 % (ref 1–4)
ERYTHROCYTE [DISTWIDTH] IN BLOOD BY AUTOMATED COUNT: 14.5 % (ref 11.8–14.4)
GFR SERPL CREATININE-BSD FRML MDRD: >90 ML/MIN/1.73M2
GLUCOSE SERPL-MCNC: 89 MG/DL (ref 74–99)
HCT VFR BLD AUTO: 42.4 % (ref 40.7–50.3)
HDLC SERPL-MCNC: 34 MG/DL
HGB BLD-MCNC: 13.7 G/DL (ref 13–17)
IMM GRANULOCYTES # BLD AUTO: 0.03 K/UL (ref 0–0.3)
IMM GRANULOCYTES NFR BLD: 0 %
LDLC SERPL CALC-MCNC: 136 MG/DL (ref 0–100)
LYMPHOCYTES NFR BLD: 1.07 K/UL (ref 1.1–3.7)
LYMPHOCYTES RELATIVE PERCENT: 14 % (ref 24–43)
MCH RBC QN AUTO: 30.4 PG (ref 25.2–33.5)
MCHC RBC AUTO-ENTMCNC: 32.3 G/DL (ref 28.4–34.8)
MCV RBC AUTO: 94.2 FL (ref 82.6–102.9)
MONOCYTES NFR BLD: 0.51 K/UL (ref 0.1–1.2)
MONOCYTES NFR BLD: 7 % (ref 3–12)
NEUTROPHILS NFR BLD: 74 % (ref 36–65)
NEUTS SEG NFR BLD: 5.61 K/UL (ref 1.5–8.1)
NRBC BLD-RTO: 0 PER 100 WBC
PLATELET # BLD AUTO: 212 K/UL (ref 138–453)
PMV BLD AUTO: 11.1 FL (ref 8.1–13.5)
POTASSIUM SERPL-SCNC: 5 MMOL/L (ref 3.7–5.3)
PROT SERPL-MCNC: 6.8 G/DL (ref 6.6–8.7)
RBC # BLD AUTO: 4.5 M/UL (ref 4.21–5.77)
RBC # BLD: ABNORMAL 10*6/UL
SODIUM SERPL-SCNC: 140 MMOL/L (ref 136–145)
TRIGL SERPL-MCNC: 67 MG/DL (ref 0–149)
VLDLC SERPL CALC-MCNC: 13 MG/DL
WBC OTHER # BLD: 7.6 K/UL (ref 3.5–11.3)

## 2024-03-29 PROCEDURE — 85025 COMPLETE CBC W/AUTO DIFF WBC: CPT

## 2024-03-29 PROCEDURE — 36415 COLL VENOUS BLD VENIPUNCTURE: CPT

## 2024-03-29 PROCEDURE — 82306 VITAMIN D 25 HYDROXY: CPT

## 2024-03-29 PROCEDURE — 80061 LIPID PANEL: CPT

## 2024-03-29 PROCEDURE — 80053 COMPREHEN METABOLIC PANEL: CPT

## 2024-03-29 PROCEDURE — 97110 THERAPEUTIC EXERCISES: CPT

## 2024-03-29 NOTE — FLOWSHEET NOTE
[x] [facility]   Outpatient Rehabilitation & Therapy  [address]  P: [phone]   F: [phone]    Physical Therapy Daily Treatment Note      Date:  3/29/2024  Patient Name:  Ha Garcia    :  1960  MRN: [id number]  Physician: Susan Turk MD                              Insurance: Medicaid --  visits remain   Medical Diagnosis: Z98.890 (ICD-10-CM) - S/P arthroscopy of shoulder    Rehab Codes: R25.511 R shoulder pain , M25.60 stiffness, R53.1 weakness   Onset Date: 23- DOS                Next Dr.'s appt: 24 -Dr. Turk   Visit# / total visits: 10/12  Cancels/No Shows: 4/0      Precautions: chronic pain, significant cardiac history, fall risk but from chronic deficits -- monitor closely      Subjective: Patient reporting to therapy stating he began to notice inc pain in the L shoulder after his last session.  Patient also reporting he had the pneumonia shot in the same shoulder.  Patient reporting a \"catching\" sensation with L shoulder ABD.    Pain:  [x] Yes  [] No   R UE 6-7/10  Neck 3/10  L shoulder 8/10  Pain altered Tx:  [] No  [] Yes  Action:  Comments:    Objective:    Exercises:  INTERVENTIONS  Reps/ Time Weight/ Level Completed  Today Comments               MODALITIES                                    MANUAL             grade I-II PA mob 5mins     Added to alleviate pain/discomfort    scapular mobilization  5mins     inferior/sup, ML   PROM UT 7 mins      Cervical traction 5 mins      STM to R SCM 3 mins      Subscapularis release 5mins  x 3/29 performed between side lying ABD to improve tolerance and decrease tightness   EXERCISES             Pulley- flexion ABD 20x 5\"     x                  Supine -RUE           PROM ER to improve shoulder ROM 15x 5\"    added   AROM shoulder flexion with DB 10x2 5\" 3# x 3/21 AROM   AAROM shoulder ER with cane 10x2 5\"    added to improve ER.   Chest press TAMARA 10x2 4#     Sidelying abd  10x2 3# x Therapist

## 2024-04-02 ENCOUNTER — HOSPITAL ENCOUNTER (OUTPATIENT)
Dept: PHYSICAL THERAPY | Age: 64
Setting detail: THERAPIES SERIES
Discharge: HOME | End: 2024-04-02
Payer: MEDICARE

## 2024-04-02 PROCEDURE — 97110 THERAPEUTIC EXERCISES: CPT

## 2024-04-02 NOTE — FLOWSHEET NOTE
assist with scapular mobility with improved tolerance noted.    3/29 Added subscap release between sets   Sidelying ER 10x2 1#  Towel under the arm, therapist guiding, tactile cues to scap  3/29 Pt continues to be limited with ROM due to weakness   Supine ER  10x2  1# x Towel under arm = better motion    Serratus punch - R only  10x2 3# x    90/90 circles: CW/CCW- R  10x2 3# x    Rhythmic stabilization 2x 30\"  x    Seated       Rows  15x2 green x Cues for better scap retraction vs elevation    Horizontal ABD  15x2 green x Cues for postural alignment    B ER pull apart  10x2 Red   Difficulty stabilizing arm at side   3/18 inc sets today- cues needed for scapular activation   Extensions  15x2  Green  x    Bicep curls neutral- RUE  15x2 4#  x    Proximal radial nerve glide 10x2   3/21 added due to pt's inc in pain   Shoulder flexion AROM 10x  x 3/27 added; tolerate ~143 degrees   Shoulder ABD AROM 10x   3/27 added; tolerate ~104 degrees   3/29 pain noted at end range   Standing       Pec stretch 3x 30\"   In corner, had to lower arm height for comfort.    Ball on wall: U/D, S/S, Circles  20x ea      Wall push ups  15x2  x At half wall for progression       Patient Education/Home Program:   - educated on fall risk. Discussed compliance and activity modification needed to have improvement with PT.   2/27: Educated about respecting pain and modifying activity if getting increase in pain.        Specific Instructions for next treatment: PN   - work R shoulder strengthening -- OKC and weight bearing   - stretch muscles that influence the R shoulder   - vaso if needed       Assessment: [x] Progressing toward goals.  Continued with strengthening this session to improve overall functional mobility with minimal pain. His R shoulder still very weak with ER but is having less pain. Able to progress to greater resistance with scapular strengthening without symptoms other than fatigue. Mod cues for form on occasion. Emphasized with

## 2024-04-03 LAB
6-ACETYLMORPHINE, UR: NOT DETECTED
7-AMINOCLONAZEPAM, URINE: NOT DETECTED
ALPHA-OH-ALPRAZ, URINE: NOT DETECTED
ALPHA-OH-MIDAZOLAM, URINE: NOT DETECTED
ALPRAZOLAM, URINE: NOT DETECTED
AMPHETAMINES, URINE: NOT DETECTED
BARBITURATES, URINE: NEGATIVE
BENZOYLECGONINE, UR: NEGATIVE
BUPRENORPHINE URINE: NOT DETECTED
CARISOPRODOL, UR: NEGATIVE
CLONAZEPAM, URINE: NOT DETECTED
CODEINE, URINE: NOT DETECTED
CREAT UR-MCNC: <20 MG/DL (ref 20–400)
DIAZEPAM, URINE: NOT DETECTED
DRUGS EXPECTED, UR: NORMAL
EER HI RES INTERP UR: NORMAL
ETHYL GLUCURONIDE UR: NEGATIVE
FENTANYL URINE: NOT DETECTED
GABAPENTIN: PRESENT
HYDROCODONE, URINE: NOT DETECTED
HYDROMORPHONE, URINE: NOT DETECTED
LORAZEPAM, URINE: NOT DETECTED
MARIJUANA METAB, UR: NORMAL
MDA, UR: NOT DETECTED
MDEA, EVE, UR: NOT DETECTED
MDMA URINE: NOT DETECTED
MEPERIDINE METAB, UR: NOT DETECTED
METHADONE, URINE: NEGATIVE
METHAMPHETAMINE, URINE: NOT DETECTED
METHYLPHENIDATE: NOT DETECTED
MIDAZOLAM, URINE: NOT DETECTED
MORPHINE, OPI1M: NOT DETECTED
NALOXONE URINE: NOT DETECTED
NORBUPRENORPHINE, URINE: NOT DETECTED
NORDIAZEPAM, URINE: NOT DETECTED
NORFENTANYL, URINE: NOT DETECTED
NORHYDROCODONE, URINE: NOT DETECTED
NOROXYCODONE, URINE: PRESENT
NOROXYMORPHONE, URINE: NOT DETECTED
OXAZEPAM, URINE: NOT DETECTED
OXYCODONE URINE: PRESENT
OXYMORPHONE, URINE: PRESENT
PAIN MANAGEMENT DRUG PANEL INTERP, URINE: NORMAL
PAIN MGT DRUG PANEL, HI RES, UR: NORMAL
PCP,URINE: NEGATIVE
PHENTERMINE, UR: NOT DETECTED
PREGABALIN: NOT DETECTED
TAPENTADOL, URINE: NOT DETECTED
TAPENTADOL-O-SULFATE, URINE: NOT DETECTED
TEMAZEPAM, URINE: NOT DETECTED
TRAMADOL, URINE: NEGATIVE
ZOLPIDEM METABOLITE (ZCA), URINE: NOT DETECTED
ZOLPIDEM, URINE: NOT DETECTED

## 2024-04-04 ENCOUNTER — HOSPITAL ENCOUNTER (OUTPATIENT)
Dept: PHYSICAL THERAPY | Age: 64
Setting detail: THERAPIES SERIES
Discharge: HOME | End: 2024-04-04
Payer: MEDICARE

## 2024-04-04 PROCEDURE — 97110 THERAPEUTIC EXERCISES: CPT

## 2024-04-04 NOTE — THERAPY DISCHARGE
keep maintaining strength and ROM. He has improved significantly with this bout of PT but needs to manage with HEP going forward. Patient has MET most goals for PT at this time . At this time will close this episode of care.   [] No change.     [] Other:        [x] Patient would continue to benefit from skilled physical therapy services in order to:  maximize R shoulder strength and tolerance to load to try to increase his ability to complete his ADLs with less pain and dysfunction.      STG: (to be met in 6 treatments)  Pt will self report worst pain no greater than 6/10 in the R shoulder with movement  in order to better tolerate ADLs/work activities with minimal dysfunction  3/8; Met - improved pain control to 3/10 & less burning pain   Pt will improve AROM in R shoulder ER to > 45 deg to better be able to reach behind his head.   3/8:: MET - 53 deg this date   LTG: (to be met in 12 treatments)  Pt will demonstrate improved R UE strength to 5/5  or greater of all major muscle groups in order to demonstrate improved stability/strength necessary for unrestricted ADLs/work activities  3/8; progressing   4/4: Progressed -- still most weakness with ER   Pt will self report he can mix his cookie dough with no increase in R shoulder pain to be able to return to an activity he enjoys.   3/8: progressing - still painful   4/4: MET - no pain increase with cooking   Pt will decrease score on SPADI to <80/130 in order to demonstrate improved functional tolerances at PLOF with minimal restriction/dysfunction   3/8; progressing, score improves 9 pt to 94/130    4/4 MET - 29 point this date, 65 pt improvement   Pt will demonstrate independence with a long term HEP for continued progress/maintenance after completion of PT   3/8: continuing to progress   4/4: MET   Pt will report no falls for x6 weeks demonstrating improved safety with mobility and implementation of fall prevention strategies.    3/8: meeting, no falls noted

## 2024-04-05 ENCOUNTER — HOSPITAL ENCOUNTER (OUTPATIENT)
Dept: VASCULAR LAB | Age: 64
End: 2024-04-05
Attending: STUDENT IN AN ORGANIZED HEALTH CARE EDUCATION/TRAINING PROGRAM
Payer: MEDICARE

## 2024-04-05 DIAGNOSIS — I73.9 PAD (PERIPHERAL ARTERY DISEASE) (HCC): ICD-10-CM

## 2024-04-05 LAB
VAS LEFT ABI: 1.14
VAS LEFT ARM BP: 138 MMHG
VAS LEFT DORSALIS PEDIS BP: 147 MMHG
VAS LEFT PTA BP: 158 MMHG
VAS LEFT TBI: 0.84
VAS LEFT TOE PRESSURE: 116 MMHG
VAS RIGHT ABI: 1.09
VAS RIGHT ARM BP: 131 MMHG
VAS RIGHT DORSALIS PEDIS BP: 149 MMHG
VAS RIGHT PTA BP: 150 MMHG
VAS RIGHT TBI: 0.88
VAS RIGHT TOE PRESSURE: 122 MMHG

## 2024-04-05 PROCEDURE — 93924 LWR XTR VASC STDY BILAT: CPT

## 2024-04-05 PROCEDURE — 93924 LWR XTR VASC STDY BILAT: CPT | Performed by: SURGERY

## 2024-05-21 ENCOUNTER — HOSPITAL ENCOUNTER (EMERGENCY)
Age: 64
Discharge: HOME | End: 2024-05-21
Attending: EMERGENCY MEDICINE
Payer: MEDICARE

## 2024-05-21 VITALS
WEIGHT: 148 LBS | SYSTOLIC BLOOD PRESSURE: 166 MMHG | HEART RATE: 77 BPM | BODY MASS INDEX: 21.92 KG/M2 | DIASTOLIC BLOOD PRESSURE: 106 MMHG | OXYGEN SATURATION: 98 % | HEIGHT: 69 IN | TEMPERATURE: 98 F | RESPIRATION RATE: 20 BRPM

## 2024-05-21 DIAGNOSIS — H57.12 PAIN OF LEFT EYE: Primary | ICD-10-CM

## 2024-05-21 PROCEDURE — 99283 EMERGENCY DEPT VISIT LOW MDM: CPT

## 2024-05-21 PROCEDURE — 6370000000 HC RX 637 (ALT 250 FOR IP): Performed by: EMERGENCY MEDICINE

## 2024-05-21 RX ORDER — ERYTHROMYCIN 5 MG/G
1 OINTMENT OPHTHALMIC EVERY 6 HOURS
Qty: 3.5 G | Refills: 0 | Status: SHIPPED | OUTPATIENT
Start: 2024-05-21 | End: 2024-05-26

## 2024-05-21 RX ORDER — TETRACAINE HYDROCHLORIDE 5 MG/ML
2 SOLUTION OPHTHALMIC ONCE
Status: COMPLETED | OUTPATIENT
Start: 2024-05-21 | End: 2024-05-21

## 2024-05-21 RX ADMIN — FLUORESCEIN SODIUM 1 MG: 1 STRIP OPHTHALMIC at 08:26

## 2024-05-21 RX ADMIN — TETRACAINE HYDROCHLORIDE 2 DROP: 5 SOLUTION OPHTHALMIC at 08:26

## 2024-05-21 ASSESSMENT — ENCOUNTER SYMPTOMS
PHOTOPHOBIA: 0
EYE DISCHARGE: 0
EYE PAIN: 1

## 2024-05-21 ASSESSMENT — PAIN - FUNCTIONAL ASSESSMENT: PAIN_FUNCTIONAL_ASSESSMENT: 0-10

## 2024-05-21 ASSESSMENT — PAIN DESCRIPTION - ORIENTATION: ORIENTATION: LEFT

## 2024-05-21 ASSESSMENT — PAIN SCALES - GENERAL: PAINLEVEL_OUTOF10: 5

## 2024-05-21 NOTE — ED TRIAGE NOTES
Mode of arrival (squad #, walk in, police, etc) : Walk in        Chief complaint(s): eye pain         Arrival Note (brief scenario, treatment PTA, etc).: Swelling and  redness on left lower eye lid since 2 days         C= \"Have you ever felt that you should Cut down on your drinking?\"  No  A= \"Have people Annoyed you by criticizing your drinking?\"  No  G= \"Have you ever felt bad or Guilty about your drinking?\"  No  E= \"Have you ever had a drink as an Eye-opener first thing in the morning to steady your nerves or to help a hangover?\"  No      Deferred []      Reason for deferring: N/A    *If yes to two or more: probable alcohol abuse.*
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
## 2024-05-22 ENCOUNTER — HOSPITAL ENCOUNTER (EMERGENCY)
Age: 64
Discharge: HOME | End: 2024-05-22
Attending: EMERGENCY MEDICINE
Payer: MEDICARE

## 2024-05-22 VITALS
DIASTOLIC BLOOD PRESSURE: 96 MMHG | HEART RATE: 66 BPM | RESPIRATION RATE: 16 BRPM | TEMPERATURE: 97 F | OXYGEN SATURATION: 98 % | SYSTOLIC BLOOD PRESSURE: 152 MMHG

## 2024-05-22 DIAGNOSIS — H00.025 HORDEOLUM INTERNUM OF LEFT LOWER EYELID: Primary | ICD-10-CM

## 2024-05-22 PROCEDURE — 6370000000 HC RX 637 (ALT 250 FOR IP): Performed by: EMERGENCY MEDICINE

## 2024-05-22 PROCEDURE — 99283 EMERGENCY DEPT VISIT LOW MDM: CPT

## 2024-05-22 RX ORDER — TETRACAINE HYDROCHLORIDE 5 MG/ML
1 SOLUTION OPHTHALMIC ONCE
Status: COMPLETED | OUTPATIENT
Start: 2024-05-22 | End: 2024-05-22

## 2024-05-22 RX ORDER — AMOXICILLIN AND CLAVULANATE POTASSIUM 875; 125 MG/1; MG/1
1 TABLET, FILM COATED ORAL 2 TIMES DAILY
Qty: 14 TABLET | Refills: 0 | Status: SHIPPED | OUTPATIENT
Start: 2024-05-22 | End: 2024-05-29

## 2024-05-22 RX ADMIN — FLUORESCEIN SODIUM 1 MG: 1 STRIP OPHTHALMIC at 07:56

## 2024-05-22 RX ADMIN — TETRACAINE HYDROCHLORIDE 1 DROP: 5 SOLUTION OPHTHALMIC at 07:56

## 2024-05-22 ASSESSMENT — PAIN DESCRIPTION - LOCATION: LOCATION: EYE

## 2024-05-22 ASSESSMENT — PAIN DESCRIPTION - PAIN TYPE: TYPE: ACUTE PAIN

## 2024-05-22 ASSESSMENT — PAIN SCALES - GENERAL: PAINLEVEL_OUTOF10: 8

## 2024-05-22 ASSESSMENT — PAIN - FUNCTIONAL ASSESSMENT: PAIN_FUNCTIONAL_ASSESSMENT: 0-10

## 2024-05-22 ASSESSMENT — PAIN DESCRIPTION - FREQUENCY: FREQUENCY: CONTINUOUS

## 2024-05-22 ASSESSMENT — PAIN DESCRIPTION - ORIENTATION: ORIENTATION: LEFT

## 2024-05-22 NOTE — ED PROVIDER NOTES
[facility] ED  Emergency Department Encounter  Emergency Medicine      Pt Name:Ha Garcia  MRN: [id number]  Birthdate 1960  Date of evaluation: 5/22/24  PCP:  Ivonne Farley MD  7:50 AM EDT      CHIEF COMPLAINT       No chief complaint on file.      HISTORY OF PRESENT ILLNESS  (Location/Symptom, Timing/Onset, Context/Setting, Quality, Duration, Modifying Factors, Severity.)      Ha Garcia is a 64 y.o. 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    PAST MEDICAL / SURGICAL / SOCIAL / FAMILY HISTORY      has a past medical history of Abdominal pain, Ascending aortic aneurysm (HCC), Chronic pain, COPD (chronic obstructive pulmonary disease) ([facility]), Dyslipidemia, GERD (gastroesophageal reflux disease), Hepatitis A, History of rib fractures multiple, Hypertension, Other accident, Pneumonia, Rectal bleeding, Rectal pain, S/P thoracic aortic aneurysm repair, Severe single current episode of major depressive disorder, without psychotic features (HCC), and Spinal fracture.       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      Social History     Socioeconomic History    Marital status:      Spouse name: Not on file    Number of children: Not on file    Years of education: Not on file    Highest

## 2024-05-22 NOTE — DISCHARGE INSTRUCTIONS
Please take antibiotics as prescribed, okay to use hot compress, follow-up with eye specialist as listed.  Return to ER for worsening pain increased swelling, fevers or vision change.

## 2024-05-22 NOTE — DISCHARGE INSTR - COC
Continuity of Care Form    Patient Name: Ha Garcia   :  1960  MRN:  [id number]    Admit date:  2024  Discharge date:  ***    Code Status Order: Prior   Advance Directives:     Admitting Physician:  No admitting provider for patient encounter.  PCP: Ivonne Farley MD    Discharging Nurse: ***  Discharging Hospital Unit/Room#: 36/36  Discharging Unit Phone Number: ***    Emergency Contact:   Extended Emergency Contact Information  Primary Emergency Contact: Rich Garcia  Address: [address]  Home Phone: [phone]  Relation: Child    Past Surgical History:  Past Surgical History:   Procedure Laterality Date    ANTERIOR CRUCIATE LIGAMENT REPAIR Left     CARDIAC CATHETERIZATION  2012    CIRCUMCISION      CORONARY ANEURYSM REPAIR  2012    Ascending aortic aneurysm repair/replacement 26mm Hemashiled under cpb    JOINT REPLACEMENT      KNEE ARTHROSCOPY Right     KNEE ARTHROSCOPY Left     OTHER SURGICAL HISTORY      spinal cord injury/rods put in    SHOULDER ARTHROSCOPY Right 2023    SHOULDER ARTHROSCOPIC, SUPERIOR CAPSULE RIGHT RECONSTRUCTION AND BICEP TENODESIS OF SHOULDER performed by Susan Turk MD at [facility] OR    SKIN GRAFT      THORACIC AORTIC ANEURYSM REPAIR      TONSILLECTOMY AND ADENOIDECTOMY      TOTAL KNEE ARTHROPLASTY Left 2015       Immunization History:   Immunization History   Administered Date(s) Administered    COVID-19, MODERNA BLUE border, Primary or Immunocompromised, (age 12y+), IM, 100 mcg/0.5mL 2021, 2021, 2021    Influenza, FLUCELVAX, (age 6 mo+), MDCK, PF, 0.5mL 10/27/2021, 2022, 2023    Pneumococcal, PCV20, PREVNAR 20, (age 6w+), IM, 0.5mL 2024    TDaP, ADACEL (age 10y-64y), BOOSTRIX (age 10y+), IM, 0.5mL 2021       Active Problems:  Patient Active Problem List   Diagnosis Code    S/P thoracic aortic aneurysm repair Z98.890, Z86.79    Essential hypertension  sent with patient):  {CHP DME Belongings:001860165}    RN SIGNATURE:  {Esignature:310791415}    CASE MANAGEMENT/SOCIAL WORK SECTION    Inpatient Status Date: ***    Readmission Risk Assessment Score:  Readmission Risk              Risk of Unplanned Readmission:  0           Discharging to Facility/ Agency   Name:   Address:  Phone:  Fax:    Dialysis Facility (if applicable)   Name:  Address:  Dialysis Schedule:  Phone:  Fax:    / signature: {Esignature:[id number]}    PHYSICIAN SECTION    Prognosis: {Prognosis:0543961037}    Condition at Discharge: { Patient Condition:231300935}    Rehab Potential (if transferring to Rehab): {Prognosis:0224821735}    Recommended Labs or Other Treatments After Discharge: ***    Physician Certification: I certify the above information and transfer of Ha Garcia  is necessary for the continuing treatment of the diagnosis listed and that he requires {Admit to Appropriate Level of Care:82887} for {GREATER/LESS:993422132} 30 days.     Update Admission H&P: {CHP DME Changes in HandP:461986382}    PHYSICIAN SIGNATURE:  {Esignature:973748356}

## 2024-05-22 NOTE — ED NOTES
Pt. Arrives to ED via private auto for c/o left eye injury.  Pt states last Friday he was at work and sandblasting an object when he felt something go into his eye.  Pt states he was wearing protective eye wear but still felt it go in.  Since then he has been having pain to that eye rated a 8/10 when open and a 10/10.  Pt was seen at [facility] yesterday and was given an antibiotic eye drops and stated that they didn't see anything in the eye.  Pt states he is still having pain and the eye drops are not helping.  Upon arrival pt is ambulatory to room 36, A&O X4, and speaking in clear sentences.

## 2024-06-05 NOTE — H&P
Antwon Toscano is a 10  y.o. 5  m.o. female being seen in the pediatric endocrinology clinic today in follow up for type 1 diabetes. She was accompanied by her mother.    Antwon was diagnosed with type 1 diabetes in July 2017. She was last seen in Jan 2018.    Interval History:   She is on a basal bolus regimen with basaglar and humalog. No severe hypoglycemic events, DKA or other adverse events since last visit.     Review of blood sugars from meter download/logbook, shows: overall average glucose level of 292 mg/dL (Range 92-HI).  She reports checking her blood glucoses levels ~2 times a day. Injection/infusion sites: abdominal wall and arm(s). Usual doses: 5 at breakfast, 6 at lunch, 5 dinner. About 2-3 for snacks.    Antwon reports having no episodes of hypoglycemia per week. Associated symptoms of hypoglycemia are dizziness and hunger. She denies symptoms of hyperglycemia such as nocturia, excessive thirst and polyuria.     Nutrition: carb counting but is not on a specified limit    Review of growth chart shows: weight decreased from last visit    Current insulin regimen:  Basaglar: 4 units     Carb Ratio: 1:10g    Correction Factor: 1 unit for every 60 over 120 during the day     Total daily dose: ~17 units/day, ~25 % basal    Review of Systems:  Constitutional: Negative for fever.   HENT: Negative for congestion and sore throat.    Eyes: Negative for discharge and redness.   Respiratory: Negative for cough and shortness of breath.    Cardiovascular: Negative for chest pain.   Gastrointestinal: Negative for nausea and vomiting.   Musculoskeletal: Negative for myalgias.   Skin: Negative for rash.   Neurological: Negative for headaches.   Gyn: pre-menarchal  Endocrine: see HPI and negative for - nocturia, change in hair pattern or skin changes      Past Medical/Family/Surgical History:  I have reviewed, and verified the past medical, surgical, and family history and updated as appropriate.    Social  "History:  She is in 4th grade     Meds:  Reviewed and reconciled.     Physical Exam:  /67   Pulse (!) 104   Ht 4' 8.1" (1.425 m)   Wt 37.1 kg (81 lb 12.7 oz)   BMI 18.27 kg/m²   General: alert, active, in no acute distress  Skin: normal tone and texture, no rashes, + evidence of BG monitoring on fingers   Injection Sites: normal  Eyes:  Conjunctivae are normal  Neck:  supple, no lymphadenopathy, no thyromegaly  Lungs: Effort normal and breath sounds normal.   Heart:  regular rate and rhythm, no edema  Abdomen:  Abdomen soft, non-tender.  Neuro: gross motor exam normal by observation    Labs:    Hemoglobin A1C   Date Value Ref Range Status   09/24/2018 12.2 (H) 4.0 - 5.6 % Final     Comment:     ADA Screening Guidelines:  5.7-6.4%  Consistent with prediabetes  >or=6.5%  Consistent with diabetes  High levels of fetal hemoglobin interfere with the HbA1C  assay. Heterozygous hemoglobin variants (HbS, HgC, etc)do  not significantly interfere with this assay.   However, presence of multiple variants may affect accuracy.     05/18/2018 11.9 (H) 4.0 - 5.6 % Final     Comment:     According to ADA guidelines, hemoglobin A1c <7.0% represents  optimal control in non-pregnant diabetic patients. Different  metrics may apply to specific patient populations.   Standards of Medical Care in Diabetes-2016.  For the purpose of screening for the presence of diabetes:  <5.7%     Consistent with the absence of diabetes  5.7-6.4%  Consistent with increasing risk for diabetes   (prediabetes)  >or=6.5%  Consistent with diabetes  Currently, no consensus exists for use of hemoglobin A1c  for diagnosis of diabetes for children.  This Hemoglobin A1c assay has significant interference with fetal   hemoglobin   (HbF). The results are invalid for patients with abnormal amounts of   HbF,   including those with known Hereditary Persistence   of Fetal Hemoglobin. Heterozygous hemoglobin variants (HbAS, HbAC,   HbAD, HbAE, HbA2) do not " INTEGUMENT:  negative for rash, skin lesions, easy bruising   HEMATOLOGIC/LYMPHATIC:  negative for swelling/edema   ALLERGIC/IMMUNOLOGIC:  negative for urticaria , itching  ENDOCRINE:  negative increase in drinking, increase in urination, hot or cold intolerance  MUSCULOSKELETAL:  negative joint pains, muscle aches, swelling of joints  NEUROLOGICAL:  negative for headaches, dizziness, lightheadedness, numbness, pain, tingling extremities  BEHAVIOR/PSYCH:  negative for depression, anxiety    Physical Exam:   BP (!) 138/99   Pulse 82   Temp 97.8 °F (36.6 °C) (Oral)   Resp 16   Ht 5' 9.5\" (1.765 m)   Wt 141 lb 1.5 oz (64 kg)   SpO2 99%   BMI 20.54 kg/m²   No results for input(s): POCGLU in the last 72 hours. General Appearance:  alert, well appearing, and in no acute distress  Mental status: oriented to person, place, and time with normal affect  Head:  normocephalic, atraumatic. Eye: no icterus, redness, pupils equal and reactive, extraocular eye movements intact, conjunctiva clear  Ear: normal external ear, no discharge, hearing intact  Nose:  no drainage noted  Mouth: mucous membranes moist  Neck: supple, no carotid bruits, thyroid not palpable  Lungs: Bilateral equal air entry, clear to ausculation, no wheezing, rales or rhonchi, normal effort  Cardiovascular: normal rate, regular rhythm, no murmur, gallop, rub. Abdomen: Soft, nontender, nondistended, normal bowel sounds, no hepatomegaly or splenomegaly  Neurologic: There are no new focal motor or sensory deficits, normal muscle tone and bulk, no abnormal sensation, normal speech, cranial nerves II through XII grossly intact  Skin: No gross lesions, rashes, bruising or bleeding on exposed skin area  Extremities:  peripheral pulses palpable, no pedal edema or calf pain with palpation  Psych: normal affect     Investigations:      Laboratory Testing:  No results found for this or any previous visit (from the past 24 hour(s)).     Recent Labs significantly interfere with this assay;   however, presence of multiple variants in a sample may impact the %   interference.     10/03/2017 7.4 (H) 4.0 - 5.6 % Final     Comment:     According to ADA guidelines, hemoglobin A1c <7.0% represents  optimal control in non-pregnant diabetic patients. Different  metrics may apply to specific patient populations.   Standards of Medical Care in Diabetes-2016.  For the purpose of screening for the presence of diabetes:  <5.7%     Consistent with the absence of diabetes  5.7-6.4%  Consistent with increasing risk for diabetes   (prediabetes)  >or=6.5%  Consistent with diabetes  Currently, no consensus exists for use of hemoglobin A1c  for diagnosis of diabetes for children.  This Hemoglobin A1c assay has significant interference with fetal   hemoglobin   (HbF). The results are invalid for patients with abnormal amounts of   HbF,   including those with known Hereditary Persistence   of Fetal Hemoglobin. Heterozygous hemoglobin variants (HbAS, HbAC,   HbAD, HbAE, HbA2) do not significantly interfere with this assay;   however, presence of multiple variants in a sample may impact the %   interference.         Screening tests:  Component      Latest Ref Rng & Units 7/11/2017   TSH      0.400 - 5.000 uIU/mL 1.783     Component      Latest Ref Rng & Units 10/3/2017   IgA      45 - 250 mg/dL 109   TTG IgA      <20 UNITS 8       Assessment/Plan:  Antwon is a 10  y.o. 5  m.o. female with T1D of 1 yr 2 months duration. Diabetes under poor control and with poor adherence to diabetes tasks. A1c is increased and above target for age.    Lab Results   Component Value Date    HGBA1C 12.2 (H) 09/24/2018       Her blood sugars were reviewed for the past four weeks. I reviewed and adjusted insulin dose: increased her basal insulin and will change to Tresiba and adjusted correction factor. I have asked her mother to supervise all BG checks and insulin injections.      Education: referred to  Diabetes Educator, blood sugar goals and hypoglycemia prevention and treatment, intensive insulin therapy, insulin kinetics and goals for therapy.    Screening tests:  Needs TSH at next visit    Follow up in 3 months.    It was a pleasure to see your patient in clinic today. Please call with any questions or concerns.      Deepthi Martinez MD  Pediatric Endocrinologist    Over 50% of this 30 minute visit was spent in counseling/coordinating care. I counseled the family on the education topics listed above.      results. Final risk determination is the responsibility of the ordering provider as history and other test results may increase or decrease the risk stratification reported for this examination. Risk stratification criteria are adapted from \"Noninvasive Risk Stratification\" criteria from Kalie Ch. Al, ACC/AATS/AHA/ASE/ASNC/SCAI/SCCT/STS 2017 Appropriate Use Criteria For Coronary Revascularization in Patients With Stable Ischemic Heart Disease [facility] Volume 69, Issue 17, May 2017 High risk (>3% annual death or MI) 1. Severe resting LV dysfunction (LVEF >35%) not readily explained by non coronary causes 2. Resting perfusion abnormalities greater than 10% of the myocardium in patients without prior history or evidence of MI 3. Stress-induced perfusion abnormalities encumbering greater than or equal to 10% myocardium or stress segmental scores indicating multiple vascular territories with abnormalities 4. Stress-induced LV dilatation (TID ratio greater than 1.19 for exercise and greater than 1.39 for regadenoson) Intermediate risk (1% to 3% annual death or MI) 1. Mild/moderate resting LV dysfunction (LVEF 35% to 49%) not readily explained by non coronary causes. 2.  Resting perfusion abnormalities in 5%-9.9% of the myocardium in patients without a history or prior evidence of MI 3. Stress-induced perfusion abnormality encumbering 5%-9.9% of the myocardium or stress segmental scores indicating 1 vascular territory with abnormalities but without LV dilation 4. Small wall motion abnormality involving 1-2 segments and only 1 coronary bed. Low Risk (Less than 1% annual death or MI) 1. Normal or small myocardial perfusion defect at rest or with stress encumbering less than 5% of the myocardium. Impressions :      1. Active Problems:    Chest pain  Resolved Problems:    * No resolved hospital problems.  *        2.  has a past medical history of Abdominal pain; Ascending aortic aneurysm ([address] (9/24/12); 0

## 2024-08-09 ENCOUNTER — HOSPITAL ENCOUNTER (OUTPATIENT)
Age: 64
End: 2024-08-09
Attending: STUDENT IN AN ORGANIZED HEALTH CARE EDUCATION/TRAINING PROGRAM
Payer: MEDICARE

## 2024-08-09 VITALS
SYSTOLIC BLOOD PRESSURE: 122 MMHG | DIASTOLIC BLOOD PRESSURE: 68 MMHG | WEIGHT: 145 LBS | HEIGHT: 68 IN | HEART RATE: 67 BPM | BODY MASS INDEX: 21.98 KG/M2

## 2024-08-09 DIAGNOSIS — R01.1 HEART MURMUR: ICD-10-CM

## 2024-08-09 PROCEDURE — 93306 TTE W/DOPPLER COMPLETE: CPT

## 2024-08-11 LAB
ECHO AO ROOT DIAM: 3.4 CM
ECHO AO ROOT INDEX: 1.91 CM/M2
ECHO AV AREA PEAK VELOCITY: 0.8 CM2
ECHO AV AREA VTI: 0.8 CM2
ECHO AV AREA/BSA PEAK VELOCITY: 0.4 CM2/M2
ECHO AV AREA/BSA VTI: 0.4 CM2/M2
ECHO AV CUSP MM: 0.8 CM
ECHO AV MEAN GRADIENT: 31 MMHG
ECHO AV MEAN VELOCITY: 2.5 M/S
ECHO AV PEAK GRADIENT: 51 MMHG
ECHO AV PEAK VELOCITY: 3.6 M/S
ECHO AV VELOCITY RATIO: 0.19
ECHO AV VTI: 89 CM
ECHO BSA: 1.78 M2
ECHO LA AREA 2C: 15.9 CM2
ECHO LA AREA 4C: 15.8 CM2
ECHO LA DIAMETER INDEX: 1.97 CM/M2
ECHO LA DIAMETER: 3.5 CM
ECHO LA MAJOR AXIS: 5.1 CM
ECHO LA MINOR AXIS: 5.1 CM
ECHO LA TO AORTIC ROOT RATIO: 1.03
ECHO LA VOL BP: 38 ML (ref 18–58)
ECHO LA VOL MOD A2C: 38 ML (ref 18–58)
ECHO LA VOL MOD A4C: 38 ML (ref 18–58)
ECHO LA VOL/BSA BIPLANE: 21 ML/M2 (ref 16–34)
ECHO LA VOLUME INDEX MOD A2C: 21 ML/M2 (ref 16–34)
ECHO LA VOLUME INDEX MOD A4C: 21 ML/M2 (ref 16–34)
ECHO LV E' LATERAL VELOCITY: 9 CM/S
ECHO LV E' SEPTAL VELOCITY: 8 CM/S
ECHO LV FRACTIONAL SHORTENING: 31 % (ref 28–44)
ECHO LV INTERNAL DIMENSION DIASTOLE INDEX: 2.7 CM/M2
ECHO LV INTERNAL DIMENSION DIASTOLIC: 4.8 CM (ref 4.2–5.9)
ECHO LV INTERNAL DIMENSION SYSTOLIC INDEX: 1.85 CM/M2
ECHO LV INTERNAL DIMENSION SYSTOLIC: 3.3 CM
ECHO LV IVSD: 1.3 CM (ref 0.6–1)
ECHO LV MASS 2D: 245.7 G (ref 88–224)
ECHO LV MASS INDEX 2D: 138.1 G/M2 (ref 49–115)
ECHO LV POSTERIOR WALL DIASTOLIC: 1.3 CM (ref 0.6–1)
ECHO LV RELATIVE WALL THICKNESS RATIO: 0.54
ECHO LVOT AREA: 3.8 CM2
ECHO LVOT AV VTI INDEX: 0.2
ECHO LVOT DIAM: 2.2 CM
ECHO LVOT MEAN GRADIENT: 1 MMHG
ECHO LVOT PEAK GRADIENT: 2 MMHG
ECHO LVOT PEAK VELOCITY: 0.7 M/S
ECHO LVOT STROKE VOLUME INDEX: 38.8 ML/M2
ECHO LVOT SV: 69.1 ML
ECHO LVOT VTI: 18.2 CM
ECHO MV A VELOCITY: 0.54 M/S
ECHO MV E DECELERATION TIME (DT): 236 MS
ECHO MV E VELOCITY: 0.51 M/S
ECHO MV E/A RATIO: 0.94
ECHO MV E/E' LATERAL: 5.67
ECHO MV E/E' RATIO (AVERAGED): 6.02
ECHO MV E/E' SEPTAL: 6.38
ECHO RA AREA 4C: 17.7 CM2
ECHO RA END SYSTOLIC VOLUME APICAL 4 CHAMBER INDEX BSA: 27 ML/M2
ECHO RA VOLUME: 48 ML
ECHO RV TAPSE: 1.8 CM (ref 1.7–?)

## 2024-08-24 ENCOUNTER — HOSPITAL ENCOUNTER (OUTPATIENT)
Dept: GENERAL RADIOLOGY | Age: 64
End: 2024-08-24
Payer: MEDICARE

## 2024-08-24 ENCOUNTER — HOSPITAL ENCOUNTER (OUTPATIENT)
Age: 64
End: 2024-08-24
Payer: MEDICARE

## 2024-08-24 DIAGNOSIS — J44.9 COPD (CHRONIC OBSTRUCTIVE PULMONARY DISEASE) CASE MANAGEMENT PATIENT (HCC): ICD-10-CM

## 2024-08-24 PROCEDURE — 71046 X-RAY EXAM CHEST 2 VIEWS: CPT

## 2024-09-18 ENCOUNTER — HOSPITAL ENCOUNTER (OUTPATIENT)
Age: 64
Discharge: HOME | End: 2024-09-18
Payer: MEDICARE

## 2024-09-18 LAB
CREAT SERPL-MCNC: 0.8 MG/DL (ref 0.7–1.2)
GFR, ESTIMATED: >90 ML/MIN/1.73M2

## 2024-09-18 PROCEDURE — 36415 COLL VENOUS BLD VENIPUNCTURE: CPT

## 2024-09-18 PROCEDURE — 82565 ASSAY OF CREATININE: CPT

## 2024-11-01 ENCOUNTER — HOSPITAL ENCOUNTER (OUTPATIENT)
Age: 64
Setting detail: SPECIMEN
Discharge: HOME | End: 2024-11-01
Payer: MEDICARE

## 2024-11-01 DIAGNOSIS — R07.9 CHEST PAIN, UNSPECIFIED TYPE: ICD-10-CM

## 2024-11-01 LAB — TROPONIN I SERPL HS-MCNC: 12 NG/L (ref 0–22)

## 2024-11-01 PROCEDURE — 36415 COLL VENOUS BLD VENIPUNCTURE: CPT

## 2024-11-01 PROCEDURE — 84484 ASSAY OF TROPONIN QUANT: CPT

## 2024-11-11 ENCOUNTER — HOSPITAL ENCOUNTER (OUTPATIENT)
Age: 64
Discharge: HOME | End: 2024-11-13
Attending: STUDENT IN AN ORGANIZED HEALTH CARE EDUCATION/TRAINING PROGRAM
Payer: MEDICARE

## 2024-11-11 ENCOUNTER — HOSPITAL ENCOUNTER (OUTPATIENT)
Dept: NUCLEAR MEDICINE | Age: 64
Discharge: HOME | End: 2024-11-13
Attending: STUDENT IN AN ORGANIZED HEALTH CARE EDUCATION/TRAINING PROGRAM
Payer: MEDICARE

## 2024-11-11 DIAGNOSIS — R07.9 CHEST PAIN, UNSPECIFIED TYPE: ICD-10-CM

## 2024-11-11 LAB
NUC STRESS EJECTION FRACTION: 55 %
STRESS BASELINE HR: 54 BPM
STRESS ESTIMATED WORKLOAD: 1 METS
STRESS PEAK DIAS BP: 96 MMHG
STRESS PEAK SYS BP: 172 MMHG
STRESS PERCENT HR ACHIEVED: 66 %
STRESS POST PEAK HR: 103 BPM
STRESS RATE PRESSURE PRODUCT: NORMAL BPM*MMHG
STRESS STAGE RECOVERY 1 BP: NORMAL MMHG
STRESS STAGE RECOVERY 1 DURATION: 1 MIN:SEC
STRESS STAGE RECOVERY 1 HR: 99 BPM
STRESS STAGE RECOVERY 2 BP: NORMAL MMHG
STRESS STAGE RECOVERY 2 DURATION: 6 MIN:SEC
STRESS STAGE RECOVERY 2 HR: 68 BPM
STRESS TARGET HR: 156 BPM
TID: 1.15

## 2024-11-11 PROCEDURE — A9500 TC99M SESTAMIBI: HCPCS | Performed by: STUDENT IN AN ORGANIZED HEALTH CARE EDUCATION/TRAINING PROGRAM

## 2024-11-11 PROCEDURE — 3430000000 HC RX DIAGNOSTIC RADIOPHARMACEUTICAL: Performed by: STUDENT IN AN ORGANIZED HEALTH CARE EDUCATION/TRAINING PROGRAM

## 2024-11-11 PROCEDURE — 2580000003 HC RX 258: Performed by: STUDENT IN AN ORGANIZED HEALTH CARE EDUCATION/TRAINING PROGRAM

## 2024-11-11 PROCEDURE — 93017 CV STRESS TEST TRACING ONLY: CPT

## 2024-11-11 PROCEDURE — 6360000002 HC RX W HCPCS: Performed by: STUDENT IN AN ORGANIZED HEALTH CARE EDUCATION/TRAINING PROGRAM

## 2024-11-11 PROCEDURE — 78452 HT MUSCLE IMAGE SPECT MULT: CPT

## 2024-11-11 RX ORDER — METOPROLOL TARTRATE 1 MG/ML
5 INJECTION, SOLUTION INTRAVENOUS EVERY 5 MIN PRN
Status: ACTIVE | OUTPATIENT
Start: 2024-11-11 | End: 2024-11-11

## 2024-11-11 RX ORDER — SODIUM CHLORIDE 9 MG/ML
500 INJECTION, SOLUTION INTRAVENOUS CONTINUOUS PRN
Status: ACTIVE | OUTPATIENT
Start: 2024-11-11 | End: 2024-11-11

## 2024-11-11 RX ORDER — NITROGLYCERIN 0.4 MG/1
0.4 TABLET SUBLINGUAL EVERY 5 MIN PRN
Status: ACTIVE | OUTPATIENT
Start: 2024-11-11 | End: 2024-11-11

## 2024-11-11 RX ORDER — ALBUTEROL SULFATE 90 UG/1
2 INHALANT RESPIRATORY (INHALATION) PRN
Status: ACTIVE | OUTPATIENT
Start: 2024-11-11 | End: 2024-11-11

## 2024-11-11 RX ORDER — TETRAKIS(2-METHOXYISOBUTYLISOCYANIDE)COPPER(I) TETRAFLUOROBORATE 1 MG/ML
30 INJECTION, POWDER, LYOPHILIZED, FOR SOLUTION INTRAVENOUS
Status: COMPLETED | OUTPATIENT
Start: 2024-11-11 | End: 2024-11-11

## 2024-11-11 RX ORDER — TETRAKIS(2-METHOXYISOBUTYLISOCYANIDE)COPPER(I) TETRAFLUOROBORATE 1 MG/ML
10 INJECTION, POWDER, LYOPHILIZED, FOR SOLUTION INTRAVENOUS
Status: COMPLETED | OUTPATIENT
Start: 2024-11-11 | End: 2024-11-11

## 2024-11-11 RX ORDER — REGADENOSON 0.08 MG/ML
0.4 INJECTION, SOLUTION INTRAVENOUS
Status: COMPLETED | OUTPATIENT
Start: 2024-11-11 | End: 2024-11-11

## 2024-11-11 RX ORDER — AMINOPHYLLINE 25 MG/ML
50 INJECTION, SOLUTION INTRAVENOUS PRN
Status: ACTIVE | OUTPATIENT
Start: 2024-11-11 | End: 2024-11-11

## 2024-11-11 RX ORDER — SODIUM CHLORIDE 0.9 % (FLUSH) 0.9 %
10 SYRINGE (ML) INJECTION PRN
Status: DISCONTINUED | OUTPATIENT
Start: 2024-11-11 | End: 2024-11-14 | Stop reason: HOSPADM

## 2024-11-11 RX ORDER — ATROPINE SULFATE 0.1 MG/ML
0.5 INJECTION INTRAVENOUS EVERY 5 MIN PRN
Status: ACTIVE | OUTPATIENT
Start: 2024-11-11 | End: 2024-11-11

## 2024-11-11 RX ORDER — SODIUM CHLORIDE 0.9 % (FLUSH) 0.9 %
5-40 SYRINGE (ML) INJECTION PRN
Status: ACTIVE | OUTPATIENT
Start: 2024-11-11 | End: 2024-11-11

## 2024-11-11 RX ADMIN — Medication 34.2 MILLICURIE: at 08:25

## 2024-11-11 RX ADMIN — Medication 10.7 MILLICURIE: at 06:59

## 2024-11-11 RX ADMIN — REGADENOSON 0.4 MG: 0.08 INJECTION, SOLUTION INTRAVENOUS at 08:21

## 2024-11-11 RX ADMIN — SODIUM CHLORIDE, PRESERVATIVE FREE 10 ML: 5 INJECTION INTRAVENOUS at 06:59

## 2025-02-05 ENCOUNTER — HOSPITAL ENCOUNTER (OUTPATIENT)
Age: 65
Setting detail: SPECIMEN
Discharge: HOME | End: 2025-02-05
Payer: MEDICARE

## 2025-02-05 DIAGNOSIS — R25.2 MUSCLE CRAMP: ICD-10-CM

## 2025-02-05 DIAGNOSIS — I10 ESSENTIAL HYPERTENSION: ICD-10-CM

## 2025-02-05 PROBLEM — F33.1 MODERATE EPISODE OF RECURRENT MAJOR DEPRESSIVE DISORDER (HCC): Status: RESOLVED | Noted: 2019-01-20 | Resolved: 2025-02-05

## 2025-02-05 LAB
ALBUMIN SERPL-MCNC: 3.9 G/DL (ref 3.5–5.2)
ALBUMIN/GLOB SERPL: 1.5 {RATIO} (ref 1–2.5)
ALP SERPL-CCNC: 65 U/L (ref 40–129)
ALT SERPL-CCNC: 8 U/L (ref 10–50)
ANION GAP SERPL CALCULATED.3IONS-SCNC: 7 MMOL/L (ref 9–16)
AST SERPL-CCNC: 17 U/L (ref 10–50)
BASOPHILS # BLD: 0.12 K/UL (ref 0–0.2)
BASOPHILS NFR BLD: 2 % (ref 0–2)
BILIRUB SERPL-MCNC: 0.3 MG/DL (ref 0–1.2)
BUN SERPL-MCNC: 8 MG/DL (ref 8–23)
CALCIUM SERPL-MCNC: 9 MG/DL (ref 8.6–10.4)
CHLORIDE SERPL-SCNC: 103 MMOL/L (ref 98–107)
CO2 SERPL-SCNC: 30 MMOL/L (ref 20–31)
CREAT SERPL-MCNC: 0.8 MG/DL (ref 0.7–1.2)
EOSINOPHIL # BLD: 0.27 K/UL (ref 0–0.44)
EOSINOPHILS RELATIVE PERCENT: 4 % (ref 1–4)
ERYTHROCYTE [DISTWIDTH] IN BLOOD BY AUTOMATED COUNT: 15.2 % (ref 11.8–14.4)
GFR, ESTIMATED: >90 ML/MIN/1.73M2
GLUCOSE SERPL-MCNC: 72 MG/DL (ref 74–99)
HCT VFR BLD AUTO: 41.7 % (ref 40.7–50.3)
HGB BLD-MCNC: 13.1 G/DL (ref 13–17)
IMM GRANULOCYTES # BLD AUTO: <0.03 K/UL (ref 0–0.3)
IMM GRANULOCYTES NFR BLD: 0 %
LYMPHOCYTES NFR BLD: 1.46 K/UL (ref 1.1–3.7)
LYMPHOCYTES RELATIVE PERCENT: 24 % (ref 24–43)
MAGNESIUM SERPL-MCNC: 2.1 MG/DL (ref 1.6–2.4)
MCH RBC QN AUTO: 30.2 PG (ref 25.2–33.5)
MCHC RBC AUTO-ENTMCNC: 31.4 G/DL (ref 28.4–34.8)
MCV RBC AUTO: 96.1 FL (ref 82.6–102.9)
MONOCYTES NFR BLD: 0.54 K/UL (ref 0.1–1.2)
MONOCYTES NFR BLD: 9 % (ref 3–12)
NEUTROPHILS NFR BLD: 61 % (ref 36–65)
NEUTS SEG NFR BLD: 3.66 K/UL (ref 1.5–8.1)
NRBC BLD-RTO: 0 PER 100 WBC
PLATELET # BLD AUTO: 213 K/UL (ref 138–453)
PMV BLD AUTO: 10.7 FL (ref 8.1–13.5)
POTASSIUM SERPL-SCNC: 4.9 MMOL/L (ref 3.7–5.3)
PROT SERPL-MCNC: 6.5 G/DL (ref 6.6–8.7)
RBC # BLD AUTO: 4.34 M/UL (ref 4.21–5.77)
RBC # BLD: ABNORMAL 10*6/UL
SODIUM SERPL-SCNC: 140 MMOL/L (ref 136–145)
WBC OTHER # BLD: 6.1 K/UL (ref 3.5–11.3)

## 2025-02-05 PROCEDURE — 36415 COLL VENOUS BLD VENIPUNCTURE: CPT

## 2025-02-05 PROCEDURE — 83735 ASSAY OF MAGNESIUM: CPT

## 2025-02-05 PROCEDURE — 80053 COMPREHEN METABOLIC PANEL: CPT

## 2025-02-05 PROCEDURE — 85025 COMPLETE CBC W/AUTO DIFF WBC: CPT

## 2025-06-18 ENCOUNTER — TELEPHONE (OUTPATIENT)
Dept: SURGERY | Age: 65
End: 2025-06-18

## 2025-06-18 NOTE — TELEPHONE ENCOUNTER
[facility] Surgery  Screening colonoscopy questionnaire  Ginette Braxton MA    Pt Name: Ha Garcia  MRN: [id number]  YOB: 1960  Primary Care Physician: Ivonne Farley MD      Have you ever had a colonoscopy? Yes  When was your last colonoscopy? 2015  Have you ever had polyps or abnormal findings on past colonoscopies No  Which bowel prep did you use? Were there any side effects from it?Golytely, NO SIDE EFFECTS     Have you ever had a stool test to check for colon cancer? Yes  Was it positive? No    Do you have personal history of colon cancer? No    Do you have any family history of colon cancer? Yes  If yes, which family member had colon cancer? SISTER  Were they diagnosed younger or older than the age of 60? under age of 60yrs    Do you have a history of Crohn's disease or Ulcerative Colitis? No    Do you have a history of constipation?  No    Do you have a history of bloody or black stools? No    Have you ever had surgery done inside your abdomen? No  What surgery? N/A    8. Are you taking any blood thinners? Yes   If yes, what is the name of the blood thinner? ASA 81 mg     9. Are you taking any GLP-1 medications or Diabetic meds ?No   If yes, what is the name of the GLP-1? N/A      Scheduling:    Okay to schedule colonoscopy.  Reason for colonoscopy: Family history of colon cancer

## (undated) DEVICE — KIT POS ULT SHLDR PT CARE REHAB SLNG

## (undated) DEVICE — DISC ABSRB YEL FLR POLYETH MGMT SUCT QUICKSUITE

## (undated) DEVICE — [ARTHROSCOPY PUMP,  DO NOT USE IF PACKAGE IS DAMAGED,  KEEP DRY,  KEEP AWAY FROM SUNLIGHT,  PROTECT FROM HEAT AND RADIOACTIVE SOURCES.]: Brand: FLOSTEADY

## (undated) DEVICE — DRESSING,GAUZE,XEROFORM,CURAD,1"X8",ST: Brand: CURAD

## (undated) DEVICE — MERCY HEALTH ST CHARLES: Brand: MEDLINE INDUSTRIES, INC.

## (undated) DEVICE — PROBE ABLAT XL 90DEG ASPIR BPLR RF 1 PC ELECTRD ERGO HNDL

## (undated) DEVICE — SOLUTION IRRIG 1000ML 0.9% SOD CHL USP POUR PLAS BTL

## (undated) DEVICE — Device

## (undated) DEVICE — 90-S, SUCTION PROBE, NON-BENDABLE, MAX CUT LEVEL 11: Brand: SERFAS ENERGY

## (undated) DEVICE — GLOVE SURG SZ 75 L12IN FNGR THK79MIL GRN LTX FREE

## (undated) DEVICE — BLADE SHV L13CM DIA4MM BNE CUT AGG DEB COOLCUT

## (undated) DEVICE — POUCH FLD 36X29IN SHLDR HVY LO GLARE MATTE FINISH FLM 2 SUCT

## (undated) DEVICE — DRESSING TRNSPAR W5XL4.5IN FLM SHT SEMIPERMEABLE WIND

## (undated) DEVICE — SUTURE FIBERWIRE SZ 2 W/ TAPERED NEEDLE BLUE L38IN NONABSORB BLU L26.5MM 1/2 CIRCLE AR7200

## (undated) DEVICE — GAUZE,SPONGE,FLUFF,6"X6.75",STRL,5/TRAY: Brand: MEDLINE

## (undated) DEVICE — IMMOBILIZER SHLDR L10.5-17IN D7IN SLNG W/ 15DEG ABD PLLW

## (undated) DEVICE — 4-PORT MANIFOLD: Brand: NEPTUNE 2

## (undated) DEVICE — GOWN,SIRUS,NONRNF,SETINSLV,XL,20/CS: Brand: MEDLINE

## (undated) DEVICE — BLADE SAW RESECTOR CUT 4MM

## (undated) DEVICE — SOLUTION IV IRRIG LACTATED RINGERS 3000ML 2B7487

## (undated) DEVICE — BLANKET WRM W40.2XL55.9IN IORT LO BODY + MISTRAL AIR

## (undated) DEVICE — GLOVE ORANGE PI 7   MSG9070

## (undated) DEVICE — INTENDED FOR TISSUE SEPARATION, AND OTHER PROCEDURES THAT REQUIRE A SHARP SURGICAL BLADE TO PUNCTURE OR CUT.: Brand: BARD-PARKER ® CARBON RIB-BACK BLADES

## (undated) DEVICE — CANNULA ARTHSCP L4CM DIA12MM DBL DAM 1 PC MOLD LO PROF FLNG

## (undated) DEVICE — KIT CHAIR TRIMANO FOAM W/ SUPP ARM DRP ERGONOMICALLY DESIGNED

## (undated) DEVICE — SUTURE PROL SZ 3-0 L18IN NONABSORBABLE BLU L24MM FS-1 3/8 8684G

## (undated) DEVICE — BIT DRL ANCHR CRV DISPOSABLE KT FIBERTAK

## (undated) DEVICE — CANNULA ARTHSCP L4CM DIA8MM PASSPRT BTTN

## (undated) DEVICE — STRIP,CLOSURE,WOUND,MEDI-STRIP,1/2X4: Brand: MEDLINE

## (undated) DEVICE — CANNULA ARTHSCP L7CM DIA7MM TRNSLUC THRD FLX W/ NO SQUIRT

## (undated) DEVICE — TUBING PMP L16FT MAIN DISP FOR AR-6400 AR-6475

## (undated) DEVICE — COVER,MAYO STAND,STERILE: Brand: MEDLINE

## (undated) DEVICE — DRAPE SURG W48XL52IN POLY U FEN REINF ADV ADH MATTE FINISH

## (undated) DEVICE — MARKER,SKIN,WI/RULER AND LABELS: Brand: MEDLINE